# Patient Record
Sex: FEMALE | Race: WHITE | NOT HISPANIC OR LATINO | Employment: FULL TIME | URBAN - METROPOLITAN AREA
[De-identification: names, ages, dates, MRNs, and addresses within clinical notes are randomized per-mention and may not be internally consistent; named-entity substitution may affect disease eponyms.]

---

## 2017-01-09 ENCOUNTER — OFFICE VISIT (OUTPATIENT)
Dept: FAMILY MEDICINE CLINIC | Facility: CLINIC | Age: 57
End: 2017-01-09

## 2017-01-09 VITALS
RESPIRATION RATE: 16 BRPM | DIASTOLIC BLOOD PRESSURE: 68 MMHG | BODY MASS INDEX: 25.06 KG/M2 | OXYGEN SATURATION: 98 % | HEIGHT: 64 IN | WEIGHT: 146.8 LBS | SYSTOLIC BLOOD PRESSURE: 122 MMHG | HEART RATE: 92 BPM | TEMPERATURE: 97.7 F

## 2017-01-09 DIAGNOSIS — I10 ESSENTIAL HYPERTENSION: Primary | ICD-10-CM

## 2017-01-09 DIAGNOSIS — E11.9 CONTROLLED TYPE 2 DIABETES MELLITUS WITHOUT COMPLICATION, WITHOUT LONG-TERM CURRENT USE OF INSULIN (HCC): ICD-10-CM

## 2017-01-09 DIAGNOSIS — E78.5 HYPERLIPIDEMIA, UNSPECIFIED HYPERLIPIDEMIA TYPE: ICD-10-CM

## 2017-01-09 PROCEDURE — 99213 OFFICE O/P EST LOW 20 MIN: CPT | Performed by: INTERNAL MEDICINE

## 2017-01-09 NOTE — PROGRESS NOTES
"Subjective   Patient ID: Rafiq Yost is a 56 y.o. female presents with   Chief Complaint   Patient presents with   • Hypertension     f/u       HPI - this patient presents today for follow-up of hypertension, hyperlipidemia, uncontrolled type 2 diabetes.  She is seeing the endocrinologist and he's titrating her medicines.  I did warn her of the potential of hypoglycemia with her Amaryl.  She watches her sugar closely and she gets symptoms if she gets hypoglycemic.  I do want her to exercise diet and lose weight.  Perhaps then she can come all the way off the glimepiride.    Assessment plan    Hypertension controlled with valsartan hydrochlorothiazide    Diabetes and hyperlipidemia she sees the endocrinologist    Health care maintenance caught up on mammogram.    Allergies   Allergen Reactions   • Codeine Other (See Comments)       The following portions of the patient's history were reviewed and updated as appropriate: allergies, current medications, past family history, past medical history, past social history, past surgical history and problem list.      Review of Systems   Constitutional: Negative.    HENT: Negative.    Respiratory: Negative.    Cardiovascular: Negative.    Psychiatric/Behavioral: Negative.        Objective     Vitals:    01/09/17 0952   BP: 122/68   Pulse: 92   Resp: 16   Temp: 97.7 °F (36.5 °C)   TempSrc: Oral   SpO2: 98%   Weight: 146 lb 12.8 oz (66.6 kg)   Height: 64\" (162.6 cm)         Physical Exam   Constitutional: She appears well-developed and well-nourished.   HENT:   Head: Normocephalic and atraumatic.   Cardiovascular: Normal rate and regular rhythm.    Pulmonary/Chest: Effort normal and breath sounds normal.   Psychiatric: She has a normal mood and affect. Her behavior is normal.   Nursing note and vitals reviewed.        Rafiq was seen today for hypertension.    Diagnoses and all orders for this visit:    Essential hypertension    Controlled type 2 diabetes mellitus without " complication, without long-term current use of insulin    Hyperlipidemia, unspecified hyperlipidemia type        Call or return to clinic prn if these symptoms worsen or fail to improve as anticipated.

## 2017-01-09 NOTE — MR AVS SNAPSHOT
Rafiq Yost   1/9/2017 9:45 AM   Office Visit    Dept Phone:  [phone]   Encounter #:  [id number]    Provider:  Kevin Barton MD   Department:  [facility]IMARY [facility]                Your Full Care Plan              Your Updated Medication List          This list is accurate as of: 1/9/17 10:13 AM.  Always use your most recent med list.                Albiglutide 50 MG pen-injector   Inject 50 mg under the skin every 7 days.       atorvastatin 20 MG tablet   Commonly known as:  LIPITOR   Take 1 tablet by mouth Daily.       Cinnamon 500 MG capsule       CLARITIN 10 MG capsule   Generic drug:  Loratadine       dapagliflozin 5 MG tablet tablet   Commonly known as:  FARXIGA   Take 1 tablet by mouth daily.       glimepiride 2 MG tablet   Commonly known as:  AMARYL   TAKE 2 TABLETS IN THE MORNING AND 1 TABLET AT SUPPER       glucose blood test strip   Commonly known as:  FREESTYLE INSULINX TEST   Testing bs 3 x day       HM VITAMIN D3 2000 UNITS capsule   Generic drug:  Cholecalciferol       metFORMIN  MG 24 hr tablet   Commonly known as:  GLUCOPHAGE-XR       PROBIOTIC ADVANCED PO       valsartan-hydrochlorothiazide 320-12.5 MG per tablet   Commonly known as:  DIOVAN-HCT   Take 1 tablet by mouth daily.               You Were Diagnosed With        Codes Comments    Essential hypertension    -  Primary ICD-10-CM: I10  ICD-9-CM: 401.9     Controlled type 2 diabetes mellitus without complication, without long-term current use of insulin     ICD-10-CM: E11.9  ICD-9-CM: 250.00     Hyperlipidemia, unspecified hyperlipidemia type     ICD-10-CM: E78.5  ICD-9-CM: 272.4       Instructions     None    Patient Instructions History      Upcoming Appointments     Visit Type Date Time Department    OFFICE VISIT 1/9/2017  9:45 AM K MARILYN [place]    OFFICE VISIT 3/13/2017  9:00 AM JACKIE CARPENTER      [email or web address] Signup     [facility] [email or web address] allows you to send messages to your  "doctor, view your test results, renew your prescriptions, schedule appointments, and more. To sign up, go to [employer] and click on the Sign Up Now link in the New User? box. Enter your [employer] Activation Code exactly as it appears below along with the last four digits of your Social Security Number and your Date of Birth () to complete the sign-up process. If you do not sign up before the expiration date, you must request a new code.    [employer] Activation Code: [email or web address]  Expires: 2017 10:13 AM    If you have questions, you can email [email or web address] or call [phone] to talk to our [employer] staff. Remember, [employer] is NOT to be used for urgent needs. For medical emergencies, dial 911.               Other Info from Your Visit           Your Appointments     Mar 13, 2017  9:00 AM EDT   Office Visit with Iglesia Douglas MD   [facility] (--)    [address] [place] [id number] [phone]           Arrive 15 minutes prior to appointment.              Allergies     Codeine Intolerance Other (See Comments)      Reason for Visit     Hypertension f/u      Vital Signs     Blood Pressure Pulse Temperature Respirations Height Weight    122/68 92 97.7 °F (36.5 °C) (Oral) 16 64\" (162.6 cm) 146 lb 12.8 oz (66.6 kg)    Oxygen Saturation Body Mass Index Smoking Status             98% 25.2 kg/m2 Never Smoker         Problems and Diagnoses Noted     Controlled type 2 diabetes mellitus without complication, without long-term current use of insulin    High blood pressure    High cholesterol or triglycerides        "

## 2017-01-23 RX ORDER — METFORMIN HYDROCHLORIDE 500 MG/1
TABLET, EXTENDED RELEASE ORAL
Qty: 360 TABLET | Refills: 1 | Status: SHIPPED | OUTPATIENT
Start: 2017-01-23 | End: 2017-07-20 | Stop reason: SDUPTHER

## 2017-02-09 RX ORDER — VALSARTAN AND HYDROCHLOROTHIAZIDE 320; 12.5 MG/1; MG/1
TABLET, FILM COATED ORAL
Qty: 30 TABLET | Refills: 5 | Status: SHIPPED | OUTPATIENT
Start: 2017-02-09 | End: 2017-03-15

## 2017-03-13 ENCOUNTER — OFFICE VISIT (OUTPATIENT)
Dept: ENDOCRINOLOGY | Age: 57
End: 2017-03-13

## 2017-03-13 VITALS
HEIGHT: 64 IN | OXYGEN SATURATION: 94 % | SYSTOLIC BLOOD PRESSURE: 116 MMHG | WEIGHT: 144.2 LBS | DIASTOLIC BLOOD PRESSURE: 58 MMHG | HEART RATE: 81 BPM | BODY MASS INDEX: 24.62 KG/M2

## 2017-03-13 DIAGNOSIS — I10 ESSENTIAL HYPERTENSION: ICD-10-CM

## 2017-03-13 DIAGNOSIS — E11.9 TYPE 2 DIABETES MELLITUS WITHOUT COMPLICATION, WITH LONG-TERM CURRENT USE OF INSULIN (HCC): Primary | ICD-10-CM

## 2017-03-13 DIAGNOSIS — E78.5 HYPERLIPIDEMIA, UNSPECIFIED HYPERLIPIDEMIA TYPE: ICD-10-CM

## 2017-03-13 DIAGNOSIS — Z79.4 TYPE 2 DIABETES MELLITUS WITHOUT COMPLICATION, WITH LONG-TERM CURRENT USE OF INSULIN (HCC): Primary | ICD-10-CM

## 2017-03-13 DIAGNOSIS — Z78.0 MENOPAUSE: ICD-10-CM

## 2017-03-13 DIAGNOSIS — E55.9 VITAMIN D DEFICIENCY: ICD-10-CM

## 2017-03-13 LAB
25(OH)D3+25(OH)D2 SERPL-MCNC: 40.3 NG/ML (ref 30–100)
ALBUMIN SERPL-MCNC: 4.7 G/DL (ref 3.5–5.2)
ALBUMIN/GLOB SERPL: 1.9 G/DL
ALP SERPL-CCNC: 112 U/L (ref 39–117)
ALT SERPL-CCNC: 19 U/L (ref 1–33)
AST SERPL-CCNC: 16 U/L (ref 1–32)
BILIRUB SERPL-MCNC: 0.3 MG/DL (ref 0.1–1.2)
BUN SERPL-MCNC: 10 MG/DL (ref 6–20)
BUN/CREAT SERPL: 12 (ref 7–25)
CALCIUM SERPL-MCNC: 10.8 MG/DL (ref 8.6–10.5)
CHLORIDE SERPL-SCNC: 93 MMOL/L (ref 98–107)
CHOLEST SERPL-MCNC: 148 MG/DL (ref 0–200)
CO2 SERPL-SCNC: 27.2 MMOL/L (ref 22–29)
CREAT SERPL-MCNC: 0.83 MG/DL (ref 0.57–1)
GLOBULIN SER CALC-MCNC: 2.5 GM/DL
GLUCOSE SERPL-MCNC: 120 MG/DL (ref 65–99)
HBA1C MFR BLD: 7 % (ref 4.8–5.6)
HDLC SERPL-MCNC: 39 MG/DL (ref 40–60)
LDLC SERPL CALC-MCNC: 89 MG/DL (ref 0–100)
POTASSIUM SERPL-SCNC: 4.8 MMOL/L (ref 3.5–5.2)
PROT SERPL-MCNC: 7.2 G/DL (ref 6–8.5)
SODIUM SERPL-SCNC: 137 MMOL/L (ref 136–145)
TRIGL SERPL-MCNC: 99 MG/DL (ref 0–150)
VLDLC SERPL CALC-MCNC: 19.8 MG/DL (ref 5–40)

## 2017-03-13 PROCEDURE — 99214 OFFICE O/P EST MOD 30 MIN: CPT | Performed by: INTERNAL MEDICINE

## 2017-03-13 RX ORDER — GLIMEPIRIDE 2 MG/1
TABLET ORAL
Qty: 60 TABLET | Refills: 5
Start: 2017-03-13 | End: 2017-06-05 | Stop reason: SDUPTHER

## 2017-03-13 NOTE — PROGRESS NOTES
Subjective   Rafiq Yost is a 57 y.o. female.     HPI Comments: F/u for dm 2, hyperlipidemia , hypertension/ testing bs 2  X day / last dm eye exam 2017/ last dm foot exam today with dr Douglas     Diabetes     Hyperlipidemia     Hypertension        Patient is a 55-year-old female who came in for follow-up. She has known diabetes mellitus since . He has been on glimepiride 2 mg 2 tabs q AM and 1 tab q PM and metformin  mg 2 tablets twice a day, Farxiga 5 mg/day and Tanzeum 50 mg weekly. Fasting blood sugar runs between . Random blood sugar runs between 67-90. She denies any severe hypoglycemia.  She has lost 5 pounds since 2016.   Her last meal was last night .      Last eye examination was in  with Dr. Brown. There was no retinopathy. She has early cataracts. She has no microalbuminuria on urine sample taken last . She denies any paresthesias.      She has hyperlipidemia and has been on Lipitor 10 mg once a day. She denies any myalgia.       She is  2 para 1. She had a previous hysterectomy. She is not on hormone replacement therapy. She had a normal bone mineral density last 2014. She has vitamin D deficiency and is on vitamin D 2000 units per day.  Her mother has history of osteoporosis.  She has no parental history of hip fracture.  She denies alcohol or tobacco use.      She has not had a colonoscopy.  She denies bowel complaints.    The following portions of the patient's history were reviewed and updated as appropriate: allergies, current medications, past family history, past medical history, past social history, past surgical history and problem list.    Review of Systems   Constitutional: Negative.    HENT: Negative.    Eyes: Negative.    Respiratory: Negative.    Cardiovascular: Negative.    Gastrointestinal: Positive for abdominal distention (bloating ).   Endocrine: Negative.    Genitourinary: Negative.    Musculoskeletal: Negative.    Skin: Negative.   "  Allergic/Immunologic: Negative.    Neurological: Negative.    Hematological: Negative.    Psychiatric/Behavioral: Negative.        Objective      Vitals:    03/13/17 0851   BP: 116/58   BP Location: Right arm   Patient Position: Sitting   Cuff Size: Small Adult   Pulse: 81   SpO2: 94%   Weight: 144 lb 3.2 oz (65.4 kg)   Height: 64\" (162.6 cm)     Physical Exam   Constitutional: She is oriented to person, place, and time. She appears well-developed and well-nourished. No distress.   HENT:   Head: Normocephalic.   Nose: Nose normal.   Mouth/Throat: No oropharyngeal exudate.   Eyes: Conjunctivae and EOM are normal. Right eye exhibits no discharge. Left eye exhibits no discharge. No scleral icterus.   Neck: Neck supple. No JVD present. No tracheal deviation present. No thyromegaly present.   Cardiovascular: Normal rate, regular rhythm, normal heart sounds and intact distal pulses.  Exam reveals no gallop and no friction rub.    No murmur heard.  Pulmonary/Chest: Effort normal and breath sounds normal. No respiratory distress. She has no wheezes. She has no rales. She exhibits no tenderness.   Abdominal: Soft. Bowel sounds are normal. She exhibits no distension and no mass. There is no tenderness.   Musculoskeletal: Normal range of motion. She exhibits no edema, tenderness or deformity.   No plantar ulcers   Lymphadenopathy:     She has no cervical adenopathy.   Neurological: She is alert and oriented to person, place, and time. She has normal reflexes. She displays normal reflexes.   Intact light touch   Skin: Skin is warm and dry. No rash noted. No pallor.   Psychiatric: She has a normal mood and affect. Her behavior is normal.     Office Visit on 11/21/2016   Component Date Value Ref Range Status   • Glucose 11/21/2016 165* 65 - 99 mg/dL Final   • BUN 11/21/2016 10  6 - 20 mg/dL Final   • Creatinine 11/21/2016 0.84  0.57 - 1.00 mg/dL Final   • eGFR Non  Am 11/21/2016 70  >60 mL/min/1.73 Final   • eGFR African " Am 11/21/2016 85  >60 mL/min/1.73 Final   • BUN/Creatinine Ratio 11/21/2016 11.9  7.0 - 25.0 Final   • Sodium 11/21/2016 139  136 - 145 mmol/L Final   • Potassium 11/21/2016 4.6  3.5 - 5.2 mmol/L Final   • Chloride 11/21/2016 96* 98 - 107 mmol/L Final   • Total CO2 11/21/2016 27.0  22.0 - 29.0 mmol/L Final   • Calcium 11/21/2016 10.9* 8.6 - 10.5 mg/dL Final   • Total Protein 11/21/2016 7.4  6.0 - 8.5 g/dL Final   • Albumin 11/21/2016 4.80  3.50 - 5.20 g/dL Final   • Globulin 11/21/2016 2.6  gm/dL Final   • A/G Ratio 11/21/2016 1.8  g/dL Final   • Total Bilirubin 11/21/2016 0.6  0.1 - 1.2 mg/dL Final   • Alkaline Phosphatase 11/21/2016 121* 39 - 117 U/L Final   • AST (SGOT) 11/21/2016 12  1 - 32 U/L Final   • ALT (SGPT) 11/21/2016 19  1 - 33 U/L Final   • Total Cholesterol 11/21/2016 187  0 - 200 mg/dL Final   • Triglycerides 11/21/2016 203* 0 - 150 mg/dL Final   • HDL Cholesterol 11/21/2016 41  40 - 60 mg/dL Final   • VLDL Cholesterol 11/21/2016 40.6* 5 - 40 mg/dL Final   • LDL Cholesterol  11/21/2016 105* 0 - 100 mg/dL Final   • Hemoglobin A1C 11/21/2016 8.41* 4.80 - 5.60 % Final    Comment: Hemoglobin A1C Ranges:  Increased Risk for Diabetes  5.7% to 6.4%  Diabetes                     >= 6.5%  Diabetic Goal                < 7.0%     • 25 Hydroxy, Vitamin D 11/21/2016 44.6  30.0 - 100.0 ng/mL Final    Comment: Reference Range for Total Vitamin D 25(OH)  Deficiency    <20.0 ng/mL  Insufficiency 21-29 ng/mL  Sufficiency    ng/mL  Toxicity      >100 ng/ml          Assessment/Plan   Rafiq was seen today for diabetes, hyperlipidemia and hypertension.    Diagnoses and all orders for this visit:    Type 2 diabetes mellitus without complication, with long-term current use of insulin  -     Comprehensive Metabolic Panel  -     Hemoglobin A1c    Hyperlipidemia, unspecified hyperlipidemia type  -     Lipid Panel    Essential hypertension    Vitamin D deficiency  -     Vitamin D 25 Hydroxy    Menopause  -     Vitamin D  25 Hydroxy  -     DEXA Bone Density Axial    Other orders  -     glimepiride (AMARYL) 2 MG tablet; TAKE 2 TABLETS IN THE MORNING.      Decrease glimepiride 2 mg  To 2 tablets once a day.  Continue metformin  mg 2 tablets twice a day.  Continue Farxiga 5 mg once a day and Tanzeum 50 mg weekly.  Continue valsartan HCT per Dr. Barton.  Continue vitamin D 2000 units per day.  Schedule bone density.  Advised to get a colonoscopy.    Send copy of my notes and labs to Dr. Barton.

## 2017-03-15 DIAGNOSIS — I15.8 OTHER SECONDARY HYPERTENSION: Primary | ICD-10-CM

## 2017-03-15 DIAGNOSIS — R68.89 ABNORMAL ENDOCRINE LABORATORY TEST FINDING: ICD-10-CM

## 2017-03-15 RX ORDER — VALSARTAN 320 MG/1
320 TABLET ORAL DAILY
Qty: 90 TABLET | Refills: 1 | Status: SHIPPED | OUTPATIENT
Start: 2017-03-15 | End: 2017-08-25 | Stop reason: SDUPTHER

## 2017-03-20 ENCOUNTER — HOSPITAL ENCOUNTER (OUTPATIENT)
Dept: BONE DENSITY | Facility: HOSPITAL | Age: 57
Discharge: HOME | End: 2017-03-20
Attending: INTERNAL MEDICINE | Admitting: INTERNAL MEDICINE

## 2017-03-20 PROCEDURE — 77080 DXA BONE DENSITY AXIAL: CPT

## 2017-04-10 ENCOUNTER — LAB (OUTPATIENT)
Dept: ENDOCRINOLOGY | Age: 57
End: 2017-04-10

## 2017-04-10 DIAGNOSIS — I15.8 OTHER SECONDARY HYPERTENSION: ICD-10-CM

## 2017-04-10 DIAGNOSIS — R68.89 ABNORMAL ENDOCRINE LABORATORY TEST FINDING: ICD-10-CM

## 2017-04-11 LAB
BUN SERPL-MCNC: 12 MG/DL (ref 6–20)
BUN/CREAT SERPL: 15.6 (ref 7–25)
CALCIUM SERPL-MCNC: 9.9 MG/DL (ref 8.6–10.5)
CHLORIDE SERPL-SCNC: 98 MMOL/L (ref 98–107)
CO2 SERPL-SCNC: 24.6 MMOL/L (ref 22–29)
CREAT SERPL-MCNC: 0.77 MG/DL (ref 0.57–1)
GLUCOSE SERPL-MCNC: 115 MG/DL (ref 65–99)
POTASSIUM SERPL-SCNC: 4.6 MMOL/L (ref 3.5–5.2)
PTH-INTACT SERPL-MCNC: 39 PG/ML (ref 15–65)
SODIUM SERPL-SCNC: 138 MMOL/L (ref 136–145)

## 2017-06-02 RX ORDER — ATORVASTATIN CALCIUM 20 MG/1
TABLET, FILM COATED ORAL
Qty: 30 TABLET | Refills: 5 | Status: SHIPPED | OUTPATIENT
Start: 2017-06-02 | End: 2017-06-05 | Stop reason: SDUPTHER

## 2017-06-02 RX ORDER — GLIMEPIRIDE 2 MG/1
TABLET ORAL
Qty: 60 TABLET | Refills: 5 | Status: SHIPPED | OUTPATIENT
Start: 2017-06-02 | End: 2017-06-05 | Stop reason: SDUPTHER

## 2017-06-05 RX ORDER — ATORVASTATIN CALCIUM 20 MG/1
TABLET, FILM COATED ORAL
Qty: 30 TABLET | Refills: 5 | Status: SHIPPED | OUTPATIENT
Start: 2017-06-05 | End: 2018-04-20 | Stop reason: SDUPTHER

## 2017-06-05 RX ORDER — GLIMEPIRIDE 2 MG/1
TABLET ORAL
Qty: 60 TABLET | Refills: 5 | Status: SHIPPED | OUTPATIENT
Start: 2017-06-05 | End: 2018-04-20 | Stop reason: SDUPTHER

## 2017-07-10 ENCOUNTER — OFFICE VISIT (OUTPATIENT)
Dept: FAMILY MEDICINE CLINIC | Facility: CLINIC | Age: 57
End: 2017-07-10

## 2017-07-10 VITALS
RESPIRATION RATE: 16 BRPM | WEIGHT: 145.6 LBS | HEART RATE: 104 BPM | BODY MASS INDEX: 24.86 KG/M2 | SYSTOLIC BLOOD PRESSURE: 116 MMHG | OXYGEN SATURATION: 98 % | HEIGHT: 64 IN | TEMPERATURE: 97.6 F | DIASTOLIC BLOOD PRESSURE: 68 MMHG

## 2017-07-10 DIAGNOSIS — E78.5 HYPERLIPIDEMIA, UNSPECIFIED HYPERLIPIDEMIA TYPE: ICD-10-CM

## 2017-07-10 DIAGNOSIS — I10 ESSENTIAL HYPERTENSION: Primary | ICD-10-CM

## 2017-07-10 DIAGNOSIS — Z12.11 COLON CANCER SCREENING: ICD-10-CM

## 2017-07-10 PROCEDURE — 99213 OFFICE O/P EST LOW 20 MIN: CPT | Performed by: INTERNAL MEDICINE

## 2017-07-10 NOTE — PROGRESS NOTES
"Subjective   Patient ID: Rafiq Yost is a 57 y.o. female presents with   Chief Complaint   Patient presents with   • Hypertension     6 month check       HPI - This patient presents today for follow-up of hypertension hyperlipidemia and type 2 diabetes.  She sees endocrinology and currently her sugar is controlled.  We talked about exercise diet weight loss to achieve some long-term weight loss.    Assessment plan    Essential hypertension hyperlipidemia and diabetes-continue current regimen seeing endocrinologist and blood pressure is well controlled.    Colon cancer screening-she refuses colonoscopy we will send her in a request for the cologuard    Allergies   Allergen Reactions   • Codeine Other (See Comments)       The following portions of the patient's history were reviewed and updated as appropriate: allergies, current medications, past family history, past medical history, past social history, past surgical history and problem list.      Review of Systems   Constitutional: Negative.    HENT: Negative.    Respiratory: Negative.    Cardiovascular: Negative.    Psychiatric/Behavioral: Negative.        Objective     Vitals:    07/10/17 0907   BP: 116/68   Pulse: 104   Resp: 16   Temp: 97.6 °F (36.4 °C)   TempSrc: Oral   SpO2: 98%   Weight: 145 lb 9.6 oz (66 kg)   Height: 64\" (162.6 cm)         Physical Exam   Constitutional: She appears well-developed and well-nourished.   HENT:   Head: Normocephalic and atraumatic.   Cardiovascular: Normal rate, regular rhythm and normal heart sounds.    Pulmonary/Chest: Effort normal and breath sounds normal.   Psychiatric: She has a normal mood and affect. Her behavior is normal.   Nursing note and vitals reviewed.        Rafiq was seen today for hypertension.    Diagnoses and all orders for this visit:    Essential hypertension    Hyperlipidemia, unspecified hyperlipidemia type    Colon cancer screening        Call or return to clinic prn if these symptoms worsen or fail to " improve as anticipated.

## 2017-07-20 DIAGNOSIS — E78.49 OTHER HYPERLIPIDEMIA: ICD-10-CM

## 2017-07-20 DIAGNOSIS — E11.9 TYPE 2 DIABETES MELLITUS WITHOUT COMPLICATION, WITHOUT LONG-TERM CURRENT USE OF INSULIN (HCC): ICD-10-CM

## 2017-07-20 DIAGNOSIS — I10 ESSENTIAL HYPERTENSION: ICD-10-CM

## 2017-07-20 RX ORDER — METFORMIN HYDROCHLORIDE 500 MG/1
TABLET, EXTENDED RELEASE ORAL
Qty: 360 TABLET | Refills: 1 | Status: SHIPPED | OUTPATIENT
Start: 2017-07-20 | End: 2018-01-03 | Stop reason: SDUPTHER

## 2017-07-21 ENCOUNTER — TRANSCRIBE ORDERS (OUTPATIENT)
Dept: ADMINISTRATIVE | Facility: HOSPITAL | Age: 57
End: 2017-07-21

## 2017-07-21 DIAGNOSIS — Z12.31 VISIT FOR SCREENING MAMMOGRAM: Primary | ICD-10-CM

## 2017-07-31 ENCOUNTER — HOSPITAL ENCOUNTER (OUTPATIENT)
Dept: MAMMOGRAPHY | Facility: HOSPITAL | Age: 57
Discharge: HOME | End: 2017-07-31
Admitting: INTERNAL MEDICINE

## 2017-07-31 DIAGNOSIS — Z12.31 VISIT FOR SCREENING MAMMOGRAM: ICD-10-CM

## 2017-07-31 PROCEDURE — G0202 SCR MAMMO BI INCL CAD: HCPCS

## 2017-08-07 ENCOUNTER — OFFICE VISIT (OUTPATIENT)
Dept: ENDOCRINOLOGY | Age: 57
End: 2017-08-07

## 2017-08-07 VITALS
SYSTOLIC BLOOD PRESSURE: 122 MMHG | BODY MASS INDEX: 24.89 KG/M2 | HEART RATE: 84 BPM | HEIGHT: 64 IN | OXYGEN SATURATION: 96 % | WEIGHT: 145.8 LBS | DIASTOLIC BLOOD PRESSURE: 66 MMHG

## 2017-08-07 DIAGNOSIS — Z79.4 TYPE 2 DIABETES MELLITUS WITHOUT COMPLICATION, WITH LONG-TERM CURRENT USE OF INSULIN (HCC): Primary | ICD-10-CM

## 2017-08-07 DIAGNOSIS — E55.9 VITAMIN D DEFICIENCY: ICD-10-CM

## 2017-08-07 DIAGNOSIS — E11.9 TYPE 2 DIABETES MELLITUS WITHOUT COMPLICATION, WITH LONG-TERM CURRENT USE OF INSULIN (HCC): Primary | ICD-10-CM

## 2017-08-07 DIAGNOSIS — E78.5 HYPERLIPIDEMIA, UNSPECIFIED HYPERLIPIDEMIA TYPE: ICD-10-CM

## 2017-08-07 DIAGNOSIS — I10 ESSENTIAL HYPERTENSION: ICD-10-CM

## 2017-08-07 DIAGNOSIS — M85.80 OSTEOPENIA: ICD-10-CM

## 2017-08-07 PROCEDURE — 99214 OFFICE O/P EST MOD 30 MIN: CPT | Performed by: INTERNAL MEDICINE

## 2017-08-07 NOTE — PROGRESS NOTES
Subjective   Rafiq Yost is a 57 y.o. female.     HPI Comments: F//u  For dm2,hyperlipidemia , hypertension / testing bs 2 x day last dm eye exam 2017/ last dm foot exam 3/13/17 with dr Douglas     Diabetes     Hyperlipidemia     Hypertension   Associated symptoms include neck pain.      Patient is a 55-year-old female who came in for follow-up. She has known diabetes mellitus since . He has been on glimepiride 2 mg 1 tabs q AM and 1 tab q PM and metformin  mg 2 tablets twice a day, Farxiga 5 mg/day and Tanzeum 50 mg weekly. Fasting blood sugar runs between . Random blood sugar runs between . She denies any severe hypoglycemia.  She has no significant weight change since 2017..  She has been having vaginal yeast infection about 3 times a month.  She denies dysuria.    Her last meal was last night .      Last eye examination was in  with Dr. Brown. There was no retinopathy. She has early cataracts. She has no microalbuminuria on urine sample taken last . She denies any paresthesias.      She has hyperlipidemia and has been on Lipitor 10 mg once a day. She denies any myalgia.       She is  2 para 1. She had a previous hysterectomy. She is not on hormone replacement therapy. She had a bone density done in on 2017 which showed osteopenia of the lumbar spine and right hip.  Her 10 year probability of major osteoporotic fracture is low at 6.6% and of hip fracture at 0.3%. . She has vitamin D deficiency and is on vitamin D 2000 units per day.  Her mother has history of osteoporosis.  She has no parental history of hip fracture.  She denies alcohol or tobacco use.      She has not had a colonoscopy.  She had a normal Cologuard in 2017.  She denies bowel complaints.    The following portions of the patient's history were reviewed and updated as appropriate: allergies, current medications, past family history, past medical history, past social history, past surgical  "history and problem list.    Review of Systems   Constitutional: Negative.    HENT: Negative.    Eyes: Negative.    Respiratory: Negative.    Cardiovascular: Negative.    Gastrointestinal: Negative.    Endocrine: Negative.    Genitourinary: Negative.    Musculoskeletal: Positive for neck pain.   Skin: Negative.    Allergic/Immunologic: Negative.    Neurological: Negative.    Hematological: Negative.    Psychiatric/Behavioral: Negative.        Objective      Vitals:    08/07/17 0901   BP: 122/66   BP Location: Right arm   Patient Position: Sitting   Cuff Size: Small Adult   Pulse: 84   SpO2: 96%   Weight: 145 lb 12.8 oz (66.1 kg)   Height: 64\" (162.6 cm)     Physical Exam   Constitutional: She is oriented to person, place, and time. She appears well-developed and well-nourished. No distress.   HENT:   Head: Normocephalic.   Nose: Nose normal.   Mouth/Throat: No oropharyngeal exudate.   Eyes: Conjunctivae and EOM are normal. Pupils are equal, round, and reactive to light. Right eye exhibits no discharge. Left eye exhibits no discharge. No scleral icterus.   Neck: Normal range of motion. Neck supple. No JVD present. No tracheal deviation present. No thyromegaly present.   Cardiovascular: Normal rate, regular rhythm, normal heart sounds and intact distal pulses.  Exam reveals no gallop and no friction rub.    No murmur heard.  Pulmonary/Chest: Effort normal and breath sounds normal. No respiratory distress. She has no wheezes. She has no rales. She exhibits no tenderness.   Abdominal: Soft. Bowel sounds are normal. She exhibits no distension and no mass. There is no tenderness.   Musculoskeletal: Normal range of motion. She exhibits no edema, tenderness or deformity.   Lymphadenopathy:     She has no cervical adenopathy.   Neurological: She is alert and oriented to person, place, and time. She has normal reflexes. She displays normal reflexes. Coordination normal.   Intact light touch    Skin: Skin is warm and dry. No " rash noted. No erythema. No pallor.   Psychiatric: She has a normal mood and affect. Her behavior is normal.     Lab on 04/10/2017   Component Date Value Ref Range Status   • Glucose 04/10/2017 115* 65 - 99 mg/dL Final   • BUN 04/10/2017 12  6 - 20 mg/dL Final   • Creatinine 04/10/2017 0.77  0.57 - 1.00 mg/dL Final   • eGFR Non African Am 04/10/2017 77  >60 mL/min/1.73 Final   • eGFR African Am 04/10/2017 93  >60 mL/min/1.73 Final   • BUN/Creatinine Ratio 04/10/2017 15.6  7.0 - 25.0 Final   • Sodium 04/10/2017 138  136 - 145 mmol/L Final   • Potassium 04/10/2017 4.6  3.5 - 5.2 mmol/L Final   • Chloride 04/10/2017 98  98 - 107 mmol/L Final   • Total CO2 04/10/2017 24.6  22.0 - 29.0 mmol/L Final   • Calcium 04/10/2017 9.9  8.6 - 10.5 mg/dL Final   • PTH, Intact 04/10/2017 39  15 - 65 pg/mL Final     Assessment/Plan   Rafiq was seen today for diabetes, hyperlipidemia and hypertension.    Diagnoses and all orders for this visit:    Type 2 diabetes mellitus without complication, with long-term current use of insulin  -     Comprehensive Metabolic Panel  -     Hemoglobin A1c  -     Urinalysis With / Culture If Indicated    Vitamin D deficiency  -     Vitamin D 25 Hydroxy    Hyperlipidemia, unspecified hyperlipidemia type    Essential hypertension    Osteopenia  -     Vitamin D 25 Hydroxy      Discontinue Farxiga because of recurrent vaginal yeast infection.  Continue glimepiride 2 mg one tablet twice a day.  Continue metformin  mg 2 tablets twice a day and Tanzeum 50 mg weekly.  If blood sugar rises off Farxiga, consider increasing glimepiride dose.  Check urinalysis and urine microalbumin.  Check urine microalbumin.  Continue Lipitor 10 mg once a day.  Check vitamin D levels.  Patient has osteopenia and has low risk for osteoporotic fractures.    Biphosphonates are not indicated at present.  Repeat bone density after March 2019.  Continue on vitamin D supplements and weight-bearing exercises.    Send copy of my  notes and labs to Dr. Kevin Barton.    RTC 4 mos.

## 2017-08-10 LAB
25(OH)D3+25(OH)D2 SERPL-MCNC: 41.7 NG/ML (ref 30–100)
ALBUMIN SERPL-MCNC: 4.8 G/DL (ref 3.5–5.2)
ALBUMIN/GLOB SERPL: 1.7 G/DL
ALP SERPL-CCNC: 108 U/L (ref 39–117)
ALT SERPL-CCNC: 22 U/L (ref 1–33)
APPEARANCE UR: CLEAR
AST SERPL-CCNC: 17 U/L (ref 1–32)
BACTERIA #/AREA URNS HPF: NORMAL /HPF
BACTERIA UR CULT: NORMAL
BACTERIA UR CULT: NORMAL
BILIRUB SERPL-MCNC: 0.4 MG/DL (ref 0.1–1.2)
BILIRUB UR QL STRIP: NEGATIVE
BUN SERPL-MCNC: 10 MG/DL (ref 6–20)
BUN/CREAT SERPL: 12.7 (ref 7–25)
CALCIUM SERPL-MCNC: 10.3 MG/DL (ref 8.6–10.5)
CHLORIDE SERPL-SCNC: 98 MMOL/L (ref 98–107)
CO2 SERPL-SCNC: 25.4 MMOL/L (ref 22–29)
COLOR UR: YELLOW
CREAT SERPL-MCNC: 0.79 MG/DL (ref 0.57–1)
EPI CELLS #/AREA URNS HPF: NORMAL /HPF
GLOBULIN SER CALC-MCNC: 2.8 GM/DL
GLUCOSE SERPL-MCNC: 136 MG/DL (ref 65–99)
GLUCOSE UR QL: ABNORMAL
HBA1C MFR BLD: 7.5 % (ref 4.8–5.6)
HGB UR QL STRIP: NEGATIVE
KETONES UR QL STRIP: NEGATIVE
LEUKOCYTE ESTERASE UR QL STRIP: ABNORMAL
MICRO URNS: ABNORMAL
MICROALBUMIN UR-MCNC: <3 UG/ML
MUCOUS THREADS URNS QL MICRO: PRESENT /HPF
NITRITE UR QL STRIP: NEGATIVE
PH UR STRIP: 6 [PH] (ref 5–7.5)
POTASSIUM SERPL-SCNC: 4.9 MMOL/L (ref 3.5–5.2)
PROT SERPL-MCNC: 7.6 G/DL (ref 6–8.5)
PROT UR QL STRIP: NEGATIVE
RBC #/AREA URNS HPF: NORMAL /HPF
SODIUM SERPL-SCNC: 139 MMOL/L (ref 136–145)
SP GR UR: 1.02 (ref 1–1.03)
URINALYSIS REFLEX: ABNORMAL
UROBILINOGEN UR STRIP-MCNC: 0.2 MG/DL (ref 0.2–1)
WBC #/AREA URNS HPF: NORMAL /HPF

## 2017-08-28 RX ORDER — VALSARTAN 320 MG/1
TABLET ORAL
Qty: 90 TABLET | Refills: 1 | Status: SHIPPED | OUTPATIENT
Start: 2017-08-28 | End: 2018-02-15 | Stop reason: SDUPTHER

## 2018-01-03 RX ORDER — METFORMIN HYDROCHLORIDE 500 MG/1
TABLET, EXTENDED RELEASE ORAL
Qty: 360 TABLET | Refills: 1 | Status: SHIPPED | OUTPATIENT
Start: 2018-01-03 | End: 2018-06-21 | Stop reason: SDUPTHER

## 2018-01-08 ENCOUNTER — OFFICE VISIT (OUTPATIENT)
Dept: FAMILY MEDICINE CLINIC | Facility: CLINIC | Age: 58
End: 2018-01-08

## 2018-01-08 VITALS
OXYGEN SATURATION: 97 % | HEART RATE: 100 BPM | RESPIRATION RATE: 16 BRPM | TEMPERATURE: 98.3 F | BODY MASS INDEX: 25.1 KG/M2 | SYSTOLIC BLOOD PRESSURE: 110 MMHG | DIASTOLIC BLOOD PRESSURE: 70 MMHG | HEIGHT: 64 IN | WEIGHT: 147 LBS

## 2018-01-08 DIAGNOSIS — Z79.4 TYPE 2 DIABETES MELLITUS WITHOUT COMPLICATION, WITH LONG-TERM CURRENT USE OF INSULIN (HCC): ICD-10-CM

## 2018-01-08 DIAGNOSIS — J01.00 SUBACUTE MAXILLARY SINUSITIS: ICD-10-CM

## 2018-01-08 DIAGNOSIS — I10 ESSENTIAL HYPERTENSION: ICD-10-CM

## 2018-01-08 DIAGNOSIS — E11.9 TYPE 2 DIABETES MELLITUS WITHOUT COMPLICATION, WITH LONG-TERM CURRENT USE OF INSULIN (HCC): ICD-10-CM

## 2018-01-08 DIAGNOSIS — E78.5 HYPERLIPIDEMIA, UNSPECIFIED HYPERLIPIDEMIA TYPE: Primary | ICD-10-CM

## 2018-01-08 DIAGNOSIS — E55.9 VITAMIN D DEFICIENCY: ICD-10-CM

## 2018-01-08 LAB
25(OH)D3+25(OH)D2 SERPL-MCNC: 43.7 NG/ML (ref 30–100)
ALBUMIN SERPL-MCNC: 4.8 G/DL (ref 3.5–5.2)
ALBUMIN/GLOB SERPL: 1.8 G/DL
ALP SERPL-CCNC: 124 U/L (ref 39–117)
ALT SERPL-CCNC: 13 U/L (ref 1–33)
AST SERPL-CCNC: 11 U/L (ref 1–32)
BILIRUB SERPL-MCNC: 0.3 MG/DL (ref 0.1–1.2)
BUN SERPL-MCNC: 11 MG/DL (ref 6–20)
BUN/CREAT SERPL: 13.8 (ref 7–25)
CALCIUM SERPL-MCNC: 10.1 MG/DL (ref 8.6–10.5)
CHLORIDE SERPL-SCNC: 97 MMOL/L (ref 98–107)
CHOLEST SERPL-MCNC: 171 MG/DL (ref 0–200)
CO2 SERPL-SCNC: 27.1 MMOL/L (ref 22–29)
CREAT SERPL-MCNC: 0.8 MG/DL (ref 0.57–1)
GLOBULIN SER CALC-MCNC: 2.6 GM/DL
GLUCOSE SERPL-MCNC: 130 MG/DL (ref 65–99)
HBA1C MFR BLD: 7.3 % (ref 4.8–5.6)
HDLC SERPL-MCNC: 40 MG/DL (ref 40–60)
LDLC SERPL CALC-MCNC: 98 MG/DL (ref 0–100)
POTASSIUM SERPL-SCNC: 4.6 MMOL/L (ref 3.5–5.2)
PROT SERPL-MCNC: 7.4 G/DL (ref 6–8.5)
SODIUM SERPL-SCNC: 138 MMOL/L (ref 136–145)
TRIGL SERPL-MCNC: 164 MG/DL (ref 0–150)
VLDLC SERPL CALC-MCNC: 32.8 MG/DL (ref 5–40)

## 2018-01-08 PROCEDURE — 99214 OFFICE O/P EST MOD 30 MIN: CPT | Performed by: INTERNAL MEDICINE

## 2018-01-08 RX ORDER — DAPAGLIFLOZIN 5 MG/1
TABLET, FILM COATED ORAL
Refills: 0 | COMMUNITY
Start: 2017-12-11 | End: 2018-01-09

## 2018-01-08 RX ORDER — AMOXICILLIN 500 MG/1
500 CAPSULE ORAL 3 TIMES DAILY
Qty: 21 CAPSULE | Refills: 0 | Status: SHIPPED | OUTPATIENT
Start: 2018-01-08 | End: 2018-07-30

## 2018-01-08 NOTE — PROGRESS NOTES
"Subjective   Patient ID: Rafiq Yost is a 57 y.o. female presents with   Chief Complaint   Patient presents with   • Diabetes     pt is fasting for labs   • Hypertension       HPI - This patient this patient presents today for treatment hyperlipidemia, hypertension, vitamin D deficiency, type 2 diabetes and she also has had a sinus infection that's been going on for a few weeks.  She also has some dental issues she started taking her.  Overall she feels good.  She is not exercising like I want her to counseled her.    Assessment plan    Type 2 diabetes continue current regimen will get A1c recommend exercise and I high protein low-carb diet    Vitamin D deficiency check vitamin D level    Hypertension valsartan 320 get a CMP    Hyperlipidemia Lipitor 20 we'll get a fasting lipid profile.    Subacute maxillary sinusitis amoxicillin.    Allergies   Allergen Reactions   • Codeine Other (See Comments)       The following portions of the patient's history were reviewed and updated as appropriate: allergies, current medications, past family history, past medical history, past social history, past surgical history and problem list.      Review of Systems   Constitutional: Negative.    HENT: Positive for congestion, postnasal drip and sinus pressure.    Eyes: Negative.    Respiratory: Negative.    Cardiovascular: Negative.    Gastrointestinal: Negative.    Endocrine: Negative.    Genitourinary: Negative.    Musculoskeletal: Negative.    Skin: Negative.    Allergic/Immunologic: Negative.    Neurological: Negative.    Hematological: Negative.    Psychiatric/Behavioral: Negative.        Objective     Vitals:    01/08/18 0914   BP: 110/70   Pulse: 100   Resp: 16   Temp: 98.3 °F (36.8 °C)   TempSrc: Oral   SpO2: 97%   Weight: 66.7 kg (147 lb)   Height: 162.6 cm (64\")         Physical Exam   Constitutional: She is oriented to person, place, and time. She appears well-developed and well-nourished. No distress.   HENT:   Head: " Normocephalic and atraumatic.   Eyes: Conjunctivae and EOM are normal. Pupils are equal, round, and reactive to light. Right eye exhibits no discharge. Left eye exhibits no discharge. No scleral icterus.   Neck: Normal range of motion. Neck supple. No tracheal deviation present. No thyromegaly present.   Cardiovascular: Normal rate, regular rhythm, normal heart sounds and normal pulses.  Exam reveals no gallop.    No murmur heard.  Pulmonary/Chest: Effort normal and breath sounds normal. No respiratory distress. She has no wheezes. She has no rales.   Musculoskeletal: Normal range of motion.   Neurological: She is alert and oriented to person, place, and time. She exhibits normal muscle tone. Coordination normal.   Skin: Skin is warm and dry. No rash noted. No erythema. No pallor.   Psychiatric: She has a normal mood and affect. Her behavior is normal. Judgment and thought content normal.   Nursing note and vitals reviewed.        Rafiq was seen today for diabetes and hypertension.    Diagnoses and all orders for this visit:    Hyperlipidemia, unspecified hyperlipidemia type  -     Lipid Panel  -     Hemoglobin A1c  -     Vitamin D 25 Hydroxy  -     Comprehensive Metabolic Panel    Essential hypertension  -     Lipid Panel  -     Hemoglobin A1c  -     Vitamin D 25 Hydroxy  -     Comprehensive Metabolic Panel    Vitamin D deficiency  -     Lipid Panel  -     Hemoglobin A1c  -     Vitamin D 25 Hydroxy  -     Comprehensive Metabolic Panel    Type 2 diabetes mellitus without complication, with long-term current use of insulin  -     Lipid Panel  -     Hemoglobin A1c  -     Vitamin D 25 Hydroxy  -     Comprehensive Metabolic Panel    Subacute maxillary sinusitis    Other orders  -     amoxicillin (AMOXIL) 500 MG capsule; Take 1 capsule by mouth 3 (Three) Times a Day.        Call or return to clinic prn if these symptoms worsen or fail to improve as anticipated.

## 2018-01-22 ENCOUNTER — OFFICE VISIT (OUTPATIENT)
Dept: ENDOCRINOLOGY | Age: 58
End: 2018-01-22

## 2018-01-22 VITALS
WEIGHT: 148.2 LBS | BODY MASS INDEX: 25.3 KG/M2 | OXYGEN SATURATION: 94 % | SYSTOLIC BLOOD PRESSURE: 128 MMHG | DIASTOLIC BLOOD PRESSURE: 62 MMHG | HEART RATE: 89 BPM | HEIGHT: 64 IN

## 2018-01-22 DIAGNOSIS — E55.9 VITAMIN D DEFICIENCY: ICD-10-CM

## 2018-01-22 DIAGNOSIS — E78.5 HYPERLIPIDEMIA, UNSPECIFIED HYPERLIPIDEMIA TYPE: ICD-10-CM

## 2018-01-22 DIAGNOSIS — I10 ESSENTIAL HYPERTENSION: ICD-10-CM

## 2018-01-22 DIAGNOSIS — E11.9 TYPE 2 DIABETES MELLITUS WITHOUT COMPLICATION, WITH LONG-TERM CURRENT USE OF INSULIN (HCC): Primary | ICD-10-CM

## 2018-01-22 DIAGNOSIS — Z79.4 TYPE 2 DIABETES MELLITUS WITHOUT COMPLICATION, WITH LONG-TERM CURRENT USE OF INSULIN (HCC): Primary | ICD-10-CM

## 2018-01-22 PROBLEM — M85.89 OSTEOPENIA OF MULTIPLE SITES: Status: ACTIVE | Noted: 2018-01-22

## 2018-01-22 PROCEDURE — 99214 OFFICE O/P EST MOD 30 MIN: CPT | Performed by: INTERNAL MEDICINE

## 2018-01-22 NOTE — PROGRESS NOTES
Subjective   Rafiq Yost is a 57 y.o. female.     HPI Comments: F/y for dm 2,hypertension, hyperlipidemia / testing bs 2 x day / last dm eye exam 2017 with dr Brown / last dm foot exam today with dr Douglas     Diabetes     Hyperlipidemia     Hypertension        Patient is a 57-year-old female who came in for follow-up. She has known diabetes mellitus since . He has been on glimepiride 2 mg 1 tabs q AM and 1 tab q PM and metformin  mg 2 tablets twice a day, Farxiga 10 mg/day and Tanzeum 50 mg weekly. Fasting blood sugar runs between . Random blood sugar runs between . She denies any severe hypoglycemia.  She has gained 3 lbs since .  She denies dysuria.  Hemoglobin A1c done in  was 7.3%. Her last meal was last night .      Last eye examination was in  with Dr. Brown. There was no retinopathy. She has early cataracts. She has no microalbuminuria on urine sample taken last .  She is on Diovan.  She denies any paresthesias.      She has hyperlipidemia and has been on Lipitor 10 mg once a day. She denies any myalgia.  Lipid profile done in 2018 are as follows:  Cholesterol 171.  HDL 40.  LDL 98.  Triglycerides 164.      She is  2 para 1. She had a previous hysterectomy. She is not on hormone replacement therapy. She had a bone density done in on 2017 which showed osteopenia of the lumbar spine and right hip.  Her 10 year probability of major osteoporotic fracture is low at 6.6% and of hip fracture at 0.3%. . She has vitamin D deficiency and is on vitamin D 2000 units per day.  Her mother has history of osteoporosis.  She has no parental history of hip fracture.  She denies alcohol or tobacco use.  25-hydroxy vitamin D levels done in 2018 was normal at 43.7 ng per ml.     She has hypertension and is on Diovan 320 mg once a day.  She has no previous history of heart attack or stroke.  She denies chest pain or shortness of breath.     She has not had a  colonoscopy.  She had a normal Cologuard in July 2017.  She denies bowel complaints.      The following portions of the patient's history were reviewed and updated as appropriate: allergies, current medications, past family history, past medical history, past social history, past surgical history and problem list.    Review of Systems   Constitutional: Negative.    HENT: Negative.    Eyes: Negative.    Respiratory: Negative.    Cardiovascular: Negative.    Gastrointestinal: Negative.    Endocrine: Negative.    Genitourinary: Negative.    Musculoskeletal: Negative.    Skin: Negative.    Allergic/Immunologic: Negative.    Neurological: Negative.    Hematological: Negative.    Psychiatric/Behavioral: Negative.        Objective   Physical Exam   Constitutional: She is oriented to person, place, and time. She appears well-developed and well-nourished. No distress.   HENT:   Head: Normocephalic.   Nose: Nose normal.   Mouth/Throat: No oropharyngeal exudate.   Eyes: Conjunctivae and EOM are normal. Right eye exhibits no discharge. Left eye exhibits no discharge. No scleral icterus.   Neck: Neck supple. No JVD present. No tracheal deviation present. No thyromegaly present.   Cardiovascular: Normal rate, regular rhythm, normal heart sounds and intact distal pulses.  Exam reveals no gallop and no friction rub.    No murmur heard.  Pulmonary/Chest: Effort normal and breath sounds normal. No respiratory distress. She has no wheezes. She has no rales.   Abdominal: Soft. Bowel sounds are normal. She exhibits no distension and no mass. There is no tenderness.   Musculoskeletal: Normal range of motion. She exhibits no edema, tenderness or deformity.   Lymphadenopathy:     She has no cervical adenopathy.   Neurological: She is alert and oriented to person, place, and time. She has normal reflexes. She displays normal reflexes. Coordination normal.   Intact light touch   Skin: Skin is warm and dry. No rash noted. No erythema. No  pallor.   Psychiatric: She has a normal mood and affect. Her behavior is normal.     Office Visit on 01/08/2018   Component Date Value Ref Range Status   • Total Cholesterol 01/08/2018 171  0 - 200 mg/dL Final   • Triglycerides 01/08/2018 164* 0 - 150 mg/dL Final   • HDL Cholesterol 01/08/2018 40  40 - 60 mg/dL Final   • VLDL Cholesterol 01/08/2018 32.8  5 - 40 mg/dL Final   • LDL Cholesterol  01/08/2018 98  0 - 100 mg/dL Final   • Hemoglobin A1C 01/08/2018 7.30* 4.80 - 5.60 % Final    Comment: Hemoglobin A1C Ranges:  Increased Risk for Diabetes  5.7% to 6.4%  Diabetes                     >= 6.5%  Diabetic Goal                < 7.0%     • 25 Hydroxy, Vitamin D 01/08/2018 43.7  30.0 - 100.0 ng/mL Final    Comment: Reference Range for Total Vitamin D 25(OH)  Deficiency    <20.0 ng/mL  Insufficiency 21-29 ng/mL  Sufficiency    ng/mL  Toxicity      >100 ng/ml        • Glucose 01/08/2018 130* 65 - 99 mg/dL Final   • BUN 01/08/2018 11  6 - 20 mg/dL Final   • Creatinine 01/08/2018 0.80  0.57 - 1.00 mg/dL Final   • eGFR Non  Am 01/08/2018 74  >60 mL/min/1.73 Final   • eGFR African Am 01/08/2018 89  >60 mL/min/1.73 Final   • BUN/Creatinine Ratio 01/08/2018 13.8  7.0 - 25.0 Final   • Sodium 01/08/2018 138  136 - 145 mmol/L Final   • Potassium 01/08/2018 4.6  3.5 - 5.2 mmol/L Final   • Chloride 01/08/2018 97* 98 - 107 mmol/L Final   • Total CO2 01/08/2018 27.1  22.0 - 29.0 mmol/L Final   • Calcium 01/08/2018 10.1  8.6 - 10.5 mg/dL Final   • Total Protein 01/08/2018 7.4  6.0 - 8.5 g/dL Final   • Albumin 01/08/2018 4.80  3.50 - 5.20 g/dL Final   • Globulin 01/08/2018 2.6  gm/dL Final   • A/G Ratio 01/08/2018 1.8  g/dL Final   • Total Bilirubin 01/08/2018 0.3  0.1 - 1.2 mg/dL Final   • Alkaline Phosphatase 01/08/2018 124* 39 - 117 U/L Final   • AST (SGOT) 01/08/2018 11  1 - 32 U/L Final   • ALT (SGPT) 01/08/2018 13  1 - 33 U/L Final     Assessment/Plan   Rafiq was seen today for diabetes, hyperlipidemia and  hypertension.    Diagnoses and all orders for this visit:    Type 2 diabetes mellitus without complication, with long-term current use of insulin    Essential hypertension    Hyperlipidemia, unspecified hyperlipidemia type    Vitamin D deficiency      Continue glimepiride, metformin, Farxiga and Tanzeum.  Patient will check if Bydureon, Trulicity or Victoza is covered cheaper through her insurance.  Continue Lipitor 10 mg once a day.  Continue vitamin D 2000 units per day.  Continue Diovan per Dr. Barton    Send copy of my notes and labs to Dr. Barton    RTC 6 mos.

## 2018-02-15 RX ORDER — VALSARTAN 320 MG/1
TABLET ORAL
Qty: 90 TABLET | Refills: 1 | Status: SHIPPED | OUTPATIENT
Start: 2018-02-15 | End: 2018-07-30 | Stop reason: ALTCHOICE

## 2018-04-20 RX ORDER — ATORVASTATIN CALCIUM 20 MG/1
TABLET, FILM COATED ORAL
Qty: 30 TABLET | Refills: 5 | Status: SHIPPED | OUTPATIENT
Start: 2018-04-20 | End: 2018-07-30 | Stop reason: SDUPTHER

## 2018-04-20 RX ORDER — GLIMEPIRIDE 2 MG/1
TABLET ORAL
Qty: 60 TABLET | Refills: 5 | Status: SHIPPED | OUTPATIENT
Start: 2018-04-20 | End: 2018-07-30 | Stop reason: SDUPTHER

## 2018-06-14 ENCOUNTER — TRANSCRIBE ORDERS (OUTPATIENT)
Dept: ADMINISTRATIVE | Facility: HOSPITAL | Age: 58
End: 2018-06-14

## 2018-06-14 DIAGNOSIS — Z12.31 VISIT FOR SCREENING MAMMOGRAM: Primary | ICD-10-CM

## 2018-06-21 RX ORDER — METFORMIN HYDROCHLORIDE 500 MG/1
500 TABLET, EXTENDED RELEASE ORAL 4 TIMES DAILY
Qty: 360 TABLET | Refills: 0 | Status: SHIPPED | OUTPATIENT
Start: 2018-06-21 | End: 2018-07-30 | Stop reason: SDUPTHER

## 2018-07-30 ENCOUNTER — OFFICE VISIT (OUTPATIENT)
Dept: ENDOCRINOLOGY | Age: 58
End: 2018-07-30

## 2018-07-30 VITALS
OXYGEN SATURATION: 98 % | DIASTOLIC BLOOD PRESSURE: 70 MMHG | BODY MASS INDEX: 24.69 KG/M2 | WEIGHT: 144.6 LBS | HEART RATE: 88 BPM | SYSTOLIC BLOOD PRESSURE: 124 MMHG | HEIGHT: 64 IN

## 2018-07-30 DIAGNOSIS — Z79.4 TYPE 2 DIABETES MELLITUS WITHOUT COMPLICATION, WITH LONG-TERM CURRENT USE OF INSULIN (HCC): Primary | ICD-10-CM

## 2018-07-30 DIAGNOSIS — E55.9 VITAMIN D DEFICIENCY: ICD-10-CM

## 2018-07-30 DIAGNOSIS — E78.5 HYPERLIPIDEMIA, UNSPECIFIED HYPERLIPIDEMIA TYPE: ICD-10-CM

## 2018-07-30 DIAGNOSIS — E11.9 TYPE 2 DIABETES MELLITUS WITHOUT COMPLICATION, WITH LONG-TERM CURRENT USE OF INSULIN (HCC): Primary | ICD-10-CM

## 2018-07-30 DIAGNOSIS — I10 ESSENTIAL HYPERTENSION: ICD-10-CM

## 2018-07-30 DIAGNOSIS — M85.89 OSTEOPENIA OF MULTIPLE SITES: ICD-10-CM

## 2018-07-30 PROBLEM — J01.00 SUBACUTE MAXILLARY SINUSITIS: Status: RESOLVED | Noted: 2018-01-08 | Resolved: 2018-07-30

## 2018-07-30 LAB
25(OH)D3+25(OH)D2 SERPL-MCNC: 40.5 NG/ML (ref 30–100)
ALBUMIN SERPL-MCNC: 4.4 G/DL (ref 3.5–5.2)
ALBUMIN/GLOB SERPL: 2.1 G/DL
ALP SERPL-CCNC: 104 U/L (ref 39–117)
ALT SERPL-CCNC: 20 U/L (ref 1–33)
AST SERPL-CCNC: 17 U/L (ref 1–32)
BILIRUB SERPL-MCNC: 0.4 MG/DL (ref 0.1–1.2)
BUN SERPL-MCNC: 9 MG/DL (ref 6–20)
BUN/CREAT SERPL: 12.5 (ref 7–25)
CALCIUM SERPL-MCNC: 10 MG/DL (ref 8.6–10.5)
CHLORIDE SERPL-SCNC: 97 MMOL/L (ref 98–107)
CHOLEST SERPL-MCNC: 156 MG/DL (ref 0–200)
CO2 SERPL-SCNC: 24.8 MMOL/L (ref 22–29)
CREAT SERPL-MCNC: 0.72 MG/DL (ref 0.57–1)
GLOBULIN SER CALC-MCNC: 2.1 GM/DL
GLUCOSE SERPL-MCNC: 109 MG/DL (ref 65–99)
HBA1C MFR BLD: 6.81 % (ref 4.8–5.6)
HDLC SERPL-MCNC: 39 MG/DL (ref 40–60)
INTERPRETATION: NORMAL
LDLC SERPL CALC-MCNC: 89 MG/DL (ref 0–100)
Lab: NORMAL
POTASSIUM SERPL-SCNC: 5 MMOL/L (ref 3.5–5.2)
PROT SERPL-MCNC: 6.5 G/DL (ref 6–8.5)
SODIUM SERPL-SCNC: 138 MMOL/L (ref 136–145)
T4 FREE SERPL-MCNC: 1.26 NG/DL (ref 0.93–1.7)
TRIGL SERPL-MCNC: 140 MG/DL (ref 0–150)
TSH SERPL DL<=0.005 MIU/L-ACNC: 1.54 MIU/ML (ref 0.27–4.2)
VLDLC SERPL CALC-MCNC: 28 MG/DL (ref 5–40)

## 2018-07-30 PROCEDURE — 99214 OFFICE O/P EST MOD 30 MIN: CPT | Performed by: INTERNAL MEDICINE

## 2018-07-30 RX ORDER — METFORMIN HYDROCHLORIDE 500 MG/1
1000 TABLET, EXTENDED RELEASE ORAL 2 TIMES DAILY WITH MEALS
Qty: 360 TABLET | Refills: 3 | Status: SHIPPED | OUTPATIENT
Start: 2018-07-31 | End: 2019-10-08 | Stop reason: SDUPTHER

## 2018-07-30 RX ORDER — GLIMEPIRIDE 2 MG/1
2 TABLET ORAL 2 TIMES DAILY
Qty: 180 TABLET | Refills: 3 | Status: SHIPPED | OUTPATIENT
Start: 2018-07-30 | End: 2019-06-24

## 2018-07-30 RX ORDER — ATORVASTATIN CALCIUM 20 MG/1
20 TABLET, FILM COATED ORAL DAILY
Qty: 90 TABLET | Refills: 3 | Status: SHIPPED | OUTPATIENT
Start: 2018-07-30 | End: 2019-07-01

## 2018-07-30 RX ORDER — IRBESARTAN 150 MG/1
150 TABLET ORAL DAILY
Qty: 90 TABLET | Refills: 3 | Status: SHIPPED | OUTPATIENT
Start: 2018-07-30 | End: 2019-01-28

## 2018-07-30 RX ORDER — QUINIDINE SULFATE 200 MG
1 TABLET ORAL 2 TIMES DAILY
COMMUNITY
End: 2019-12-18 | Stop reason: HOSPADM

## 2018-07-30 NOTE — PROGRESS NOTES
Ewelina Yost is a 58 y.o. female.     F/u for dm 2, hypertension, hyperlipidemia / testing bs 2 x day / last dm eye exam 18 with dr Brown / last dm foot exam 18 with dr Douglas       Diabetes     Hypertension        Patient is a 57-year-old female who came in for follow-up. She has known diabetes mellitus since . He has been on glimepiride 2 mg 1 tabs q AM and 1 tab q PM and metformin  mg 2 tablets twice a day, Farxiga 10 mg/day and Trulicity 1.5 mg weekly. Fasting blood sugar runs between . Random blood sugar runs between . She denies any severe hypoglycemia.  She has lost 3 lbs since .  Her last meal was last night.      Last eye examination was in  with Dr. Brown. There was no retinopathy. She has early cataracts. She has no microalbuminuria on urine sample taken last .  She is on generic Diovan which has been recalled.  She denies any paresthesias.      She has hyperlipidemia and has been on Lipitor 10 mg once a day. She denies any myalgia.        She is  2 para 1. She had a previous hysterectomy. She is not on hormone replacement therapy. She had a bone density done in on 2017 which showed osteopenia of the lumbar spine and right hip.  Her 10 year probability of major osteoporotic fracture is low at 6.6% and of hip fracture at 0.3%. . She has vitamin D deficiency and is on vitamin D 2000 units per day.  Her mother has history of osteoporosis.  She has no parental history of hip fracture.  She denies alcohol or tobacco use.       She has hypertension and is on Diovan 320 mg once a day.  She has no previous history of heart attack or stroke.  She denies chest pain or shortness of breath.     She has not had a colonoscopy.  She had a normal Cologuard in 2017.  She denies bowel complaints.     The following portions of the patient's history were reviewed and updated as appropriate: allergies, current medications, past family history, past medical  "history, past social history, past surgical history and problem list.    Review of Systems   Constitutional: Negative.    HENT: Negative.    Eyes: Negative.    Respiratory: Negative.    Cardiovascular: Negative.    Gastrointestinal: Negative.    Endocrine: Negative.    Genitourinary: Negative.    Musculoskeletal: Negative.    Skin: Negative.    Allergic/Immunologic: Negative.    Neurological: Negative.    Hematological: Negative.    Psychiatric/Behavioral: Negative.        Objective      Vitals:    07/30/18 0902   BP: 124/70   BP Location: Right arm   Patient Position: Sitting   Cuff Size: Large Adult   Pulse: 88   SpO2: 98%   Weight: 65.6 kg (144 lb 9.6 oz)   Height: 162.6 cm (64.02\")     Physical Exam   Constitutional: She is oriented to person, place, and time. She appears well-developed and well-nourished.   HENT:   Head: Normocephalic.   Nose: Nose normal.   Mouth/Throat: No oropharyngeal exudate.   Eyes: Conjunctivae and EOM are normal. Right eye exhibits no discharge. Left eye exhibits no discharge. No scleral icterus.   Neck: Neck supple. No JVD present. No tracheal deviation present. No thyromegaly present.   Cardiovascular: Normal rate, regular rhythm, normal heart sounds and intact distal pulses.  Exam reveals no gallop and no friction rub.    No murmur heard.  Pulmonary/Chest: Effort normal and breath sounds normal. No respiratory distress. She has no wheezes. She has no rales. She exhibits no tenderness.   Abdominal: Soft. Bowel sounds are normal. She exhibits no distension and no mass. There is no tenderness. There is no rebound and no guarding.   Musculoskeletal: Normal range of motion. She exhibits no edema, tenderness or deformity.   Lymphadenopathy:     She has no cervical adenopathy.   Neurological: She is alert and oriented to person, place, and time. She displays normal reflexes. She exhibits normal muscle tone. Coordination normal.   Intact light touch   Skin: Skin is warm and dry. No pallor. "   Psychiatric: She has a normal mood and affect. Her behavior is normal.     Office Visit on 01/08/2018   Component Date Value Ref Range Status   • Total Cholesterol 01/08/2018 171  0 - 200 mg/dL Final   • Triglycerides 01/08/2018 164* 0 - 150 mg/dL Final   • HDL Cholesterol 01/08/2018 40  40 - 60 mg/dL Final   • VLDL Cholesterol 01/08/2018 32.8  5 - 40 mg/dL Final   • LDL Cholesterol  01/08/2018 98  0 - 100 mg/dL Final   • Hemoglobin A1C 01/08/2018 7.30* 4.80 - 5.60 % Final    Comment: Hemoglobin A1C Ranges:  Increased Risk for Diabetes  5.7% to 6.4%  Diabetes                     >= 6.5%  Diabetic Goal                < 7.0%     • 25 Hydroxy, Vitamin D 01/08/2018 43.7  30.0 - 100.0 ng/mL Final    Comment: Reference Range for Total Vitamin D 25(OH)  Deficiency    <20.0 ng/mL  Insufficiency 21-29 ng/mL  Sufficiency    ng/mL  Toxicity      >100 ng/ml        • Glucose 01/08/2018 130* 65 - 99 mg/dL Final   • BUN 01/08/2018 11  6 - 20 mg/dL Final   • Creatinine 01/08/2018 0.80  0.57 - 1.00 mg/dL Final   • eGFR Non  Am 01/08/2018 74  >60 mL/min/1.73 Final   • eGFR African Am 01/08/2018 89  >60 mL/min/1.73 Final   • BUN/Creatinine Ratio 01/08/2018 13.8  7.0 - 25.0 Final   • Sodium 01/08/2018 138  136 - 145 mmol/L Final   • Potassium 01/08/2018 4.6  3.5 - 5.2 mmol/L Final   • Chloride 01/08/2018 97* 98 - 107 mmol/L Final   • Total CO2 01/08/2018 27.1  22.0 - 29.0 mmol/L Final   • Calcium 01/08/2018 10.1  8.6 - 10.5 mg/dL Final   • Total Protein 01/08/2018 7.4  6.0 - 8.5 g/dL Final   • Albumin 01/08/2018 4.80  3.50 - 5.20 g/dL Final   • Globulin 01/08/2018 2.6  gm/dL Final   • A/G Ratio 01/08/2018 1.8  g/dL Final   • Total Bilirubin 01/08/2018 0.3  0.1 - 1.2 mg/dL Final   • Alkaline Phosphatase 01/08/2018 124* 39 - 117 U/L Final   • AST (SGOT) 01/08/2018 11  1 - 32 U/L Final   • ALT (SGPT) 01/08/2018 13  1 - 33 U/L Final     Assessment/Plan   Rafiq was seen today for diabetes and hypertension.    Diagnoses and  all orders for this visit:    Type 2 diabetes mellitus without complication, with long-term current use of insulin (CMS/[facility])  -     glimepiride (AMARYL) 2 MG tablet; Take 1 tablet by mouth 2 (Two) Times a Day.  -     metFORMIN ER (GLUCOPHAGE-XR) 500 MG 24 hr tablet; 2 tablets twice a day with meals  -     Comprehensive Metabolic Panel  -     Lipid Panel  -     Hemoglobin A1c  -     TSH  -     T4, Free    Hyperlipidemia, unspecified hyperlipidemia type  -     atorvastatin (LIPITOR) 20 MG tablet; Take 1 tablet by mouth Daily.  -     Comprehensive Metabolic Panel  -     Lipid Panel  -     Hemoglobin A1c  -     TSH  -     T4, Free    Essential hypertension  -     irbesartan (AVAPRO) 150 MG tablet; Take 1 tablet by mouth Daily.  -     Comprehensive Metabolic Panel    Vitamin D deficiency  -     Comprehensive Metabolic Panel  -     Vitamin D 25 Hydroxy    Osteopenia of multiple sites  -     Comprehensive Metabolic Panel  -     Vitamin D 25 Hydroxy      Continue glimepiride, metformin, Farxiga, and Trulicity.  Continue Lipitor 20 mg once a day.  Discontinue generic Diovan because of recall.  Start irbesartan 150 mg once a day.  Continue vitamin D 2000 units per day.  Repeat bone density after March 2019.    RTC 6 mos.

## 2018-08-06 ENCOUNTER — HOSPITAL ENCOUNTER (OUTPATIENT)
Dept: MAMMOGRAPHY | Facility: HOSPITAL | Age: 58
Discharge: HOME | End: 2018-08-06
Admitting: FAMILY MEDICINE

## 2018-08-06 DIAGNOSIS — Z12.31 VISIT FOR SCREENING MAMMOGRAM: ICD-10-CM

## 2018-08-06 PROCEDURE — 77067 SCR MAMMO BI INCL CAD: CPT

## 2018-08-06 RX ORDER — VALSARTAN 320 MG/1
TABLET ORAL
Qty: 90 TABLET | Refills: 1 | OUTPATIENT
Start: 2018-08-06

## 2018-09-10 RX ORDER — DULAGLUTIDE 1.5 MG/.5ML
INJECTION, SOLUTION SUBCUTANEOUS
Qty: 12 PEN | Refills: 1 | Status: SHIPPED | OUTPATIENT
Start: 2018-09-10 | End: 2019-03-09 | Stop reason: SDUPTHER

## 2018-09-17 ENCOUNTER — OFFICE VISIT (OUTPATIENT)
Dept: FAMILY MEDICINE CLINIC | Facility: CLINIC | Age: 58
End: 2018-09-17

## 2018-09-17 VITALS
HEIGHT: 64 IN | OXYGEN SATURATION: 98 % | TEMPERATURE: 98.2 F | HEART RATE: 86 BPM | BODY MASS INDEX: 24.41 KG/M2 | DIASTOLIC BLOOD PRESSURE: 82 MMHG | SYSTOLIC BLOOD PRESSURE: 120 MMHG | WEIGHT: 143 LBS

## 2018-09-17 DIAGNOSIS — E11.9 TYPE 2 DIABETES MELLITUS WITHOUT COMPLICATION, WITHOUT LONG-TERM CURRENT USE OF INSULIN (HCC): ICD-10-CM

## 2018-09-17 DIAGNOSIS — E78.5 HYPERLIPIDEMIA, UNSPECIFIED HYPERLIPIDEMIA TYPE: Primary | ICD-10-CM

## 2018-09-17 DIAGNOSIS — I10 ESSENTIAL HYPERTENSION: ICD-10-CM

## 2018-09-17 PROCEDURE — 99214 OFFICE O/P EST MOD 30 MIN: CPT | Performed by: NURSE PRACTITIONER

## 2018-09-17 NOTE — PROGRESS NOTES
Ewelina Yost is a 58 y.o. female presents to establish care. Has a history of hyperlipidemia. Also with DM 2, denies any complications with diabetes. Currently followed by endocrinology and A1C was 6.7 at last visit. Tolerating all medication well, without side effect. Recent labs at endocrinology, reviewed with patient. No recent urine microalbumin, patient agreeable to leave a urine specimen today.    Shingles vaccine is indicated, patient interested and will follow up with her pharmacy to get further information/immunizations.  She will get her flu shot when offered from work, works at [employer] in [employer].      Hyperlipidemia   This is a chronic problem. The current episode started more than 1 year ago. The problem is controlled. Recent lipid tests were reviewed and are normal. She has no history of chronic renal disease, diabetes, hypothyroidism, liver disease, obesity or nephrotic syndrome. There are no known factors aggravating her hyperlipidemia. Pertinent negatives include no chest pain, focal sensory loss, focal weakness, leg pain or shortness of breath. Current antihyperlipidemic treatment includes statins. The current treatment provides moderate improvement of lipids. There are no compliance problems.  Risk factors for coronary artery disease include diabetes mellitus and dyslipidemia.        The following portions of the patient's history were reviewed and updated as appropriate: allergies, current medications, past family history, past medical history, past social history, past surgical history and problem list.    Review of Systems   Constitutional: Negative.    HENT: Negative.    Eyes: Negative.    Respiratory: Negative.  Negative for shortness of breath.    Cardiovascular: Negative.  Negative for chest pain.   Gastrointestinal: Negative.    Endocrine: Negative.    Genitourinary: Negative.    Musculoskeletal: Negative.    Skin: Negative.    Allergic/Immunologic: Negative.     Neurological: Negative.  Negative for focal weakness.   Hematological: Negative.    Psychiatric/Behavioral: Negative.        Objective   Physical Exam   Constitutional: She is oriented to person, place, and time. She appears well-developed and well-nourished.   HENT:   Head: Normocephalic and atraumatic.   Eyes: Pupils are equal, round, and reactive to light.   Neck: Neck supple.   Cardiovascular: Normal rate, regular rhythm and normal heart sounds.  Exam reveals no gallop and no friction rub.    No murmur heard.  Pulmonary/Chest: Effort normal and breath sounds normal. No respiratory distress. She has no wheezes. She has no rales.   Abdominal: Soft. Bowel sounds are normal. She exhibits no distension. There is no tenderness.   Neurological: She is alert and oriented to person, place, and time.   Skin: Skin is warm and dry.   Psychiatric: She has a normal mood and affect.   Vitals reviewed.      Assessment/Plan   Rafiq was seen today for hyperlipidemia.    Diagnoses and all orders for this visit:    Hyperlipidemia, unspecified hyperlipidemia type    Type 2 diabetes mellitus without complication, without long-term current use of insulin (CMS/[facility])  -     Microalbumin / Creatinine Urine Ratio - Urine, Clean Catch    Essential hypertension

## 2018-09-18 LAB
ALBUMIN/CREAT UR: <6.6 MG/G CREAT (ref 0–30)
CREAT UR-MCNC: 45.6 MG/DL
MICROALBUMIN UR-MCNC: <3 UG/ML

## 2018-10-05 ENCOUNTER — OFFICE VISIT (OUTPATIENT)
Dept: FAMILY MEDICINE CLINIC | Facility: CLINIC | Age: 58
End: 2018-10-05

## 2018-10-05 VITALS
BODY MASS INDEX: 24.82 KG/M2 | HEIGHT: 64 IN | HEART RATE: 65 BPM | TEMPERATURE: 97.9 F | OXYGEN SATURATION: 97 % | SYSTOLIC BLOOD PRESSURE: 120 MMHG | WEIGHT: 145.4 LBS | DIASTOLIC BLOOD PRESSURE: 80 MMHG

## 2018-10-05 DIAGNOSIS — J30.89 SEASONAL ALLERGIC RHINITIS DUE TO OTHER ALLERGIC TRIGGER: ICD-10-CM

## 2018-10-05 DIAGNOSIS — S16.1XXA STRAIN OF NECK MUSCLE, INITIAL ENCOUNTER: Primary | ICD-10-CM

## 2018-10-05 PROCEDURE — 99213 OFFICE O/P EST LOW 20 MIN: CPT | Performed by: NURSE PRACTITIONER

## 2018-10-05 RX ORDER — METHOCARBAMOL 500 MG/1
500 TABLET, FILM COATED ORAL 4 TIMES DAILY
Qty: 60 TABLET | Refills: 0 | Status: SHIPPED | OUTPATIENT
Start: 2018-10-05 | End: 2019-09-25

## 2018-10-05 NOTE — PROGRESS NOTES
"Ewelina Yost is a 58 y.o. female presents with 2 week history of neck stiffness and shoulder pain. Reports chronic \"tight\" neck that has severely worsened. Pain is 6/10 with movement. 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 No known injury, she does not remember waking with a stiff neck. It has just gotten tighter and worse over the past few days now hurting into her shoulder. Denies numbness/tingling.     Also with one day history of sore throat and post nasal drainage, nasal congestion. Denies fever or chills. Does report increased fatigue. No cough, shortness of breath or wheezing.  Has not tried any medications currently.       Neck Pain    This is a new problem. The current episode started 1 to 4 weeks ago. The problem occurs daily. The problem has been gradually worsening. The pain is associated with nothing. The pain is present in the left side. The quality of the pain is described as aching. The pain is at a severity of 6/10. The pain is moderate. The symptoms are aggravated by position. The pain is same all the time. Pertinent negatives include no chest pain, fever, headaches, leg pain, numbness, pain with swallowing, paresis, photophobia, syncope, tingling, trouble swallowing, visual change, weakness or weight loss. She has tried NSAIDs for the symptoms. The treatment provided mild relief.   Sore Throat    This is a new problem. The current episode started yesterday. The problem has been unchanged. There has been no fever. The pain is at a severity of 3/10. The pain is mild. Associated symptoms include congestion and neck pain. Pertinent negatives include no abdominal pain, coughing, diarrhea, drooling, ear discharge, ear pain, headaches, hoarse voice, plugged ear sensation, shortness of breath, stridor, swollen glands, trouble swallowing or vomiting. She has had no exposure to strep or mono. She has tried NSAIDs for the symptoms. The treatment provided mild relief.        The following " portions of the patient's history were reviewed and updated as appropriate: allergies, current medications, past family history, past medical history, past social history, past surgical history and problem list.    Review of Systems   Constitutional: Positive for activity change (decreased) and fatigue. Negative for chills, fever and weight loss.   HENT: Positive for congestion, postnasal drip and sore throat. Negative for drooling, ear discharge, ear pain, hoarse voice, sinus pain, sinus pressure and trouble swallowing.    Eyes: Negative.  Negative for photophobia.   Respiratory: Negative.  Negative for cough, shortness of breath and stridor.    Cardiovascular: Negative.  Negative for chest pain and syncope.   Gastrointestinal: Negative.  Negative for abdominal pain, diarrhea and vomiting.   Endocrine: Negative.    Genitourinary: Negative.    Musculoskeletal: Positive for arthralgias, neck pain and neck stiffness (x 2 weeks, gradually worsening).   Skin: Negative.    Allergic/Immunologic: Negative.    Neurological: Negative.  Negative for tingling, weakness, numbness and headaches.   Hematological: Negative.    Psychiatric/Behavioral: Negative.        Objective   Physical Exam   Constitutional: She is oriented to person, place, and time. She appears well-developed and well-nourished.   HENT:   Head: Normocephalic and atraumatic.   Right Ear: Tympanic membrane normal.   Left Ear: Tympanic membrane and ear canal normal.   Nose: Mucosal edema present. Right sinus exhibits no maxillary sinus tenderness and no frontal sinus tenderness. Left sinus exhibits no maxillary sinus tenderness and no frontal sinus tenderness.   Mouth/Throat: Uvula is midline and mucous membranes are normal. Posterior oropharyngeal erythema present.   Eyes: Pupils are equal, round, and reactive to light. Conjunctivae are normal.   Neck: Neck supple.   Cardiovascular: Normal rate, regular rhythm and normal heart sounds.  Exam reveals no gallop and  no friction rub.    No murmur heard.  Pulmonary/Chest: Effort normal and breath sounds normal. No respiratory distress. She has no wheezes. She has no rales.   Lymphadenopathy:     She has no cervical adenopathy.   Neurological: She is alert and oriented to person, place, and time.   Skin: Skin is warm and dry.   Psychiatric: She has a normal mood and affect.   Vitals reviewed.      Assessment/Plan   Rafiq was seen today for neck pain and shoulder pain.    Diagnoses and all orders for this visit:    Strain of neck muscle, initial encounter    Seasonal allergic rhinitis due to other allergic trigger    Other orders  -     methocarbamol (ROBAXIN) 500 MG tablet; Take 1 tablet by mouth 4 (Four) Times a Day.      Recommend steroid injection, patient declined, would like to try muscle relaxers and continue ibiuprofen as needed.  She will return if her symptoms worsen or she has no improvement and would like an injection.     Sore throat/nasal congestion related to allergies, recommend zyrtec daily, for worsening symptoms, sinus pain etc, return to office.

## 2018-10-08 ENCOUNTER — TELEPHONE (OUTPATIENT)
Dept: FAMILY MEDICINE CLINIC | Facility: CLINIC | Age: 58
End: 2018-10-08

## 2018-10-08 RX ORDER — AMOXICILLIN 875 MG/1
875 TABLET, COATED ORAL EVERY 12 HOURS SCHEDULED
Qty: 20 TABLET | Refills: 0 | Status: SHIPPED | OUTPATIENT
Start: 2018-10-08 | End: 2019-01-25

## 2018-10-08 NOTE — TELEPHONE ENCOUNTER
rX sent to pharmacy on file for amoxicillin twice daily x 10 days  Start allergy medicine  Gargle warm salt water, ibuprofen as needed

## 2018-10-08 NOTE — TELEPHONE ENCOUNTER
Last seen 10/05/2018    Pt was recently seen for a sore throat. She said that she is now having a really bad sore throat, loss of voice, and severe congestion. She is wondering if we can call something in for her?

## 2019-01-25 ENCOUNTER — OFFICE VISIT (OUTPATIENT)
Dept: FAMILY MEDICINE CLINIC | Facility: CLINIC | Age: 59
End: 2019-01-25

## 2019-01-25 VITALS
HEIGHT: 64 IN | SYSTOLIC BLOOD PRESSURE: 122 MMHG | DIASTOLIC BLOOD PRESSURE: 70 MMHG | TEMPERATURE: 97.7 F | WEIGHT: 143.3 LBS | BODY MASS INDEX: 24.46 KG/M2 | HEART RATE: 64 BPM

## 2019-01-25 DIAGNOSIS — J01.00 ACUTE NON-RECURRENT MAXILLARY SINUSITIS: Primary | ICD-10-CM

## 2019-01-25 PROCEDURE — 99213 OFFICE O/P EST LOW 20 MIN: CPT | Performed by: NURSE PRACTITIONER

## 2019-01-25 RX ORDER — DOXYCYCLINE 100 MG/1
100 CAPSULE ORAL EVERY 12 HOURS SCHEDULED
Qty: 14 CAPSULE | Refills: 0 | Status: SHIPPED | OUTPATIENT
Start: 2019-01-25 | End: 2019-06-20

## 2019-01-25 NOTE — PROGRESS NOTES
Ewelina Yost is a 58 y.o. female presents with > 7 day history of nasal congestion, sinus pain/pressure, chest congestion, cough, occasionally productive, Denies wheezing. Difficulty getting a deep breath this morning. Chills at nighttime. Feels warm to touch intermittently.     Has tried otc Ahist, flonase, dayquil, alkaseltzer with little relief.     URI    This is a new problem. The current episode started in the past 7 days. The problem has been gradually worsening. There has been no fever. Associated symptoms include congestion, coughing, headaches, rhinorrhea and sinus pain. Pertinent negatives include no abdominal pain, chest pain, diarrhea, dysuria, ear pain, joint pain, joint swelling, nausea, neck pain, plugged ear sensation, rash, sneezing, sore throat, swollen glands, vomiting or wheezing. She has tried decongestant, antihistamine and acetaminophen for the symptoms. The treatment provided mild relief.        The following portions of the patient's history were reviewed and updated as appropriate: allergies, current medications, past family history, past medical history, past social history, past surgical history and problem list.    Review of Systems   Constitutional: Negative for chills, fatigue and fever.   HENT: Positive for congestion, postnasal drip, rhinorrhea, sinus pressure and sinus pain. Negative for ear pain, sneezing and sore throat.    Respiratory: Positive for cough and shortness of breath. Negative for wheezing.    Cardiovascular: Negative for chest pain.   Gastrointestinal: Negative for abdominal pain, diarrhea, nausea and vomiting.   Genitourinary: Negative for dysuria.   Musculoskeletal: Negative for joint pain and neck pain.   Skin: Negative for rash.   Neurological: Positive for headaches.       Objective   Physical Exam   Constitutional: She is oriented to person, place, and time. She appears well-developed and well-nourished.   HENT:   Head: Normocephalic and atraumatic.    Right Ear: Tympanic membrane and ear canal normal.   Left Ear: Tympanic membrane and ear canal normal.   Nose: Mucosal edema present. Right sinus exhibits maxillary sinus tenderness. Right sinus exhibits no frontal sinus tenderness. Left sinus exhibits maxillary sinus tenderness. Left sinus exhibits no frontal sinus tenderness.   Mouth/Throat: Uvula is midline, oropharynx is clear and moist and mucous membranes are normal.   Eyes: Conjunctivae are normal. Pupils are equal, round, and reactive to light.   Neck: Neck supple.   Cardiovascular: Normal rate, regular rhythm and normal heart sounds. Exam reveals no gallop and no friction rub.   No murmur heard.  Pulmonary/Chest: Effort normal and breath sounds normal. No stridor. No respiratory distress. She has no wheezes. She has no rales.   Lymphadenopathy:     She has no cervical adenopathy.   Neurological: She is alert and oriented to person, place, and time.   Vitals reviewed.      Assessment/Plan   Rafiq was seen today for nasal congestion, headache, fatigue, cough and shortness of breath.    Diagnoses and all orders for this visit:    Acute non-recurrent maxillary sinusitis    Other orders  -     doxycycline (MONODOX) 100 MG capsule; Take 1 capsule by mouth Every 12 (Twelve) Hours.

## 2019-03-11 RX ORDER — DULAGLUTIDE 1.5 MG/.5ML
INJECTION, SOLUTION SUBCUTANEOUS
Qty: 4 PEN | Refills: 0 | Status: SHIPPED | OUTPATIENT
Start: 2019-03-11 | End: 2019-04-05 | Stop reason: SDUPTHER

## 2019-04-05 RX ORDER — DULAGLUTIDE 1.5 MG/.5ML
INJECTION, SOLUTION SUBCUTANEOUS
Qty: 2 ML | Refills: 0 | Status: SHIPPED | OUTPATIENT
Start: 2019-04-05 | End: 2019-04-29 | Stop reason: SDUPTHER

## 2019-04-12 RX ORDER — DAPAGLIFLOZIN 10 MG/1
TABLET, FILM COATED ORAL
Qty: 90 TABLET | Refills: 0 | Status: SHIPPED | OUTPATIENT
Start: 2019-04-12 | End: 2019-06-19 | Stop reason: SDUPTHER

## 2019-04-12 RX ORDER — DAPAGLIFLOZIN 10 MG/1
TABLET, FILM COATED ORAL
Qty: 90 TABLET | Refills: 0 | Status: SHIPPED | OUTPATIENT
Start: 2019-04-12 | End: 2019-08-27 | Stop reason: SDUPTHER

## 2019-04-29 RX ORDER — DULAGLUTIDE 1.5 MG/.5ML
INJECTION, SOLUTION SUBCUTANEOUS
Qty: 4 PEN | Refills: 0 | Status: SHIPPED | OUTPATIENT
Start: 2019-04-29 | End: 2019-05-27 | Stop reason: SDUPTHER

## 2019-05-28 RX ORDER — DULAGLUTIDE 1.5 MG/.5ML
INJECTION, SOLUTION SUBCUTANEOUS
Qty: 4 PEN | Refills: 0 | Status: SHIPPED | OUTPATIENT
Start: 2019-05-28 | End: 2019-06-22 | Stop reason: SDUPTHER

## 2019-06-19 NOTE — PROGRESS NOTES
Subjective   Rafiq Yost is a 59 y.o. female.     F/u for dm 2, hypertension, hyperlipidemia/ testing bs 2 x day / last dm eye exam 2019 with dr Brown/ last dm foot exam today with dr Douglas        Patient is a 59-year-old female who came in for follow-up. She has known diabetes mellitus since . He has been on glimepiride 2 mg 1 tabs q AM and 1 tab q PM and metformin  mg 2 tablets twice a day, Farxiga 10 mg/day and Trulicity 1.5 mg weekly. Fasting blood sugar runs between 109-145. Random blood sugar runs between . She denies any severe hypoglycemia.  She has no significant weight change since 2018.  Her last meal was last night.      Last eye examination was in  with Dr. Brown. There was no retinopathy. She has early cataracts. She has no microalbuminuria on urine sample taken last .  She is on generic Diovan which has been recalled.  She denies any paresthesias.      She has hyperlipidemia and has been on Lipitor 20 mg once a day. She denies any myalgia.        She is  2 para 1. She had a previous hysterectomy. She is not on hormone replacement therapy. She had a bone density done in on 2017 which showed osteopenia of the lumbar spine and right hip.  Her 10 year probability of major osteoporotic fracture is low at 6.6% and of hip fracture at 0.3%. . She has vitamin D deficiency and is on vitamin D 2000 units per day.  Her mother has history of osteoporosis.  She has no parental history of hip fracture.  She denies alcohol or tobacco use.       She has hypertension and is on irbesartan 150 mg once a day.  She has no previous history of heart attack or stroke.  She denies chest pain or shortness of breath.     She has not had a colonoscopy.  She had a normal Cologuard in 2017.  She denies bowel complaints.    The following portions of the patient's history were reviewed and updated as appropriate: allergies, current medications, past family history, past medical  "history, past social history, past surgical history and problem list.    Review of Systems   Constitutional: Negative.    HENT: Negative.    Eyes: Negative.    Respiratory: Negative.    Cardiovascular: Negative.    Gastrointestinal: Negative.    Endocrine: Negative.    Genitourinary: Negative.    Musculoskeletal: Negative.    Skin: Negative.    Allergic/Immunologic: Negative.    Neurological: Negative.    Hematological: Negative.    Psychiatric/Behavioral: Negative.        Objective      Vitals:    06/20/19 0900 06/20/19 0945   BP: 156/88 130/80   BP Location: Right arm    Patient Position: Sitting    Cuff Size: Small Adult    Pulse: 86    SpO2: 98%    Weight: 65.6 kg (144 lb 9.6 oz)    Height: 162.6 cm (64.02\")      Physical Exam   Constitutional: She is oriented to person, place, and time. She appears well-developed and well-nourished. No distress.   HENT:   Head: Normocephalic.   Nose: Nose normal.   Mouth/Throat: Oropharynx is clear and moist. No oropharyngeal exudate.   Eyes: Conjunctivae and EOM are normal. Right eye exhibits no discharge. Left eye exhibits no discharge. No scleral icterus.   Neck: Neck supple. No JVD present. No tracheal deviation present. No thyromegaly present.   Cardiovascular: Normal rate, regular rhythm, normal heart sounds and intact distal pulses. Exam reveals no gallop and no friction rub.   No murmur heard.  Pulmonary/Chest: Effort normal and breath sounds normal. No stridor. No respiratory distress. She has no wheezes. She has no rales. She exhibits no tenderness.   Abdominal: Soft. Bowel sounds are normal. She exhibits no distension and no mass. There is no tenderness. There is no rebound and no guarding.   Musculoskeletal: Normal range of motion. She exhibits no edema, tenderness or deformity.   Lymphadenopathy:     She has no cervical adenopathy.   Neurological: She is alert and oriented to person, place, and time. She displays normal reflexes. She exhibits normal muscle tone. " Coordination normal.   Intact light touch on lower ext   Skin: Skin is warm and dry. No erythema. No pallor.   Psychiatric: She has a normal mood and affect. Her behavior is normal.     Office Visit on 09/17/2018   Component Date Value Ref Range Status   • Creatinine, Urine 09/17/2018 45.6  Not Estab. mg/dL Final   • Microalbumin, Urine 09/17/2018 <3.0  Not Estab. ug/mL Final   • Microalbumin/Creatinine Ratio 09/17/2018 <6.6  0.0 - 30.0 mg/g creat Final     Assessment/Plan   Rafiq was seen today for diabetes, hyperlipidemia and hypertension.    Diagnoses and all orders for this visit:    Type 2 diabetes mellitus without complication, with long-term current use of insulin (CMS/[facility])  -     Comprehensive Metabolic Panel  -     Lipid Panel  -     Hemoglobin A1c  -     TSH  -     T4, Free  -     Microalbumin / Creatinine Urine Ratio - Urine, Clean Catch    Hyperlipidemia, unspecified hyperlipidemia type  -     Comprehensive Metabolic Panel  -     Lipid Panel  -     TSH  -     T4, Free    Essential hypertension  -     Comprehensive Metabolic Panel    Vitamin D deficiency  -     Comprehensive Metabolic Panel  -     Vitamin D 25 Hydroxy    Osteopenia of multiple sites  -     Comprehensive Metabolic Panel  -     Vitamin D 25 Hydroxy  -     DEXA Bone Density Axial    Other orders  -     irbesartan (AVAPRO) 300 MG tablet; Take 0.5 tablets by mouth Daily.      Continue glimepiride, metformin ER, Farxiga, and Trulicity  Continue Lipitor 20 mg/day.  Continue irbesartan 150 mg/day.  Continue vitamin D 2000 units/day.  Schedule follow-up bone density.    Copy of my note sent to Nicki Rogers NP    RTC 4 mos.

## 2019-06-20 ENCOUNTER — OFFICE VISIT (OUTPATIENT)
Dept: ENDOCRINOLOGY | Age: 59
End: 2019-06-20

## 2019-06-20 VITALS
HEART RATE: 86 BPM | BODY MASS INDEX: 24.69 KG/M2 | DIASTOLIC BLOOD PRESSURE: 80 MMHG | SYSTOLIC BLOOD PRESSURE: 130 MMHG | OXYGEN SATURATION: 98 % | HEIGHT: 64 IN | WEIGHT: 144.6 LBS

## 2019-06-20 DIAGNOSIS — Z79.4 TYPE 2 DIABETES MELLITUS WITHOUT COMPLICATION, WITH LONG-TERM CURRENT USE OF INSULIN (HCC): Primary | ICD-10-CM

## 2019-06-20 DIAGNOSIS — M85.89 OSTEOPENIA OF MULTIPLE SITES: ICD-10-CM

## 2019-06-20 DIAGNOSIS — E11.9 TYPE 2 DIABETES MELLITUS WITHOUT COMPLICATION, WITH LONG-TERM CURRENT USE OF INSULIN (HCC): Primary | ICD-10-CM

## 2019-06-20 DIAGNOSIS — E78.5 HYPERLIPIDEMIA, UNSPECIFIED HYPERLIPIDEMIA TYPE: ICD-10-CM

## 2019-06-20 DIAGNOSIS — I10 ESSENTIAL HYPERTENSION: ICD-10-CM

## 2019-06-20 DIAGNOSIS — E55.9 VITAMIN D DEFICIENCY: ICD-10-CM

## 2019-06-20 PROCEDURE — 99214 OFFICE O/P EST MOD 30 MIN: CPT | Performed by: INTERNAL MEDICINE

## 2019-06-20 RX ORDER — IRBESARTAN 300 MG/1
150 TABLET ORAL DAILY
Qty: 45 TABLET | Refills: 1
Start: 2019-06-20 | End: 2019-07-21 | Stop reason: SDUPTHER

## 2019-06-21 LAB
25(OH)D3+25(OH)D2 SERPL-MCNC: 38.2 NG/ML (ref 30–100)
ALBUMIN SERPL-MCNC: 4.8 G/DL (ref 3.5–5.2)
ALBUMIN/CREAT UR: <4.8 MG/G CREAT (ref 0–30)
ALBUMIN/GLOB SERPL: 2.5 G/DL
ALP SERPL-CCNC: 95 U/L (ref 39–117)
ALT SERPL-CCNC: 13 U/L (ref 1–33)
AST SERPL-CCNC: 12 U/L (ref 1–32)
BILIRUB SERPL-MCNC: 0.4 MG/DL (ref 0.2–1.2)
BUN SERPL-MCNC: 10 MG/DL (ref 8–23)
BUN/CREAT SERPL: 14.7 (ref 7–25)
CALCIUM SERPL-MCNC: 9.5 MG/DL (ref 8.6–10.5)
CHLORIDE SERPL-SCNC: 98 MMOL/L (ref 98–107)
CHOLEST SERPL-MCNC: 153 MG/DL (ref 0–200)
CO2 SERPL-SCNC: 27.1 MMOL/L (ref 22–29)
CREAT SERPL-MCNC: 0.68 MG/DL (ref 0.57–1)
CREAT UR-MCNC: 62.1 MG/DL
GLOBULIN SER CALC-MCNC: 1.9 GM/DL
GLUCOSE SERPL-MCNC: 111 MG/DL (ref 65–99)
HBA1C MFR BLD: 7.3 % (ref 4.8–5.6)
HDLC SERPL-MCNC: 40 MG/DL (ref 40–60)
INTERPRETATION: NORMAL
LDLC SERPL CALC-MCNC: 88 MG/DL (ref 0–100)
Lab: NORMAL
MICROALBUMIN UR-MCNC: <3 UG/ML
POTASSIUM SERPL-SCNC: 4.4 MMOL/L (ref 3.5–5.2)
PROT SERPL-MCNC: 6.7 G/DL (ref 6–8.5)
SODIUM SERPL-SCNC: 137 MMOL/L (ref 136–145)
T4 FREE SERPL-MCNC: 1.36 NG/DL (ref 0.93–1.7)
TRIGL SERPL-MCNC: 127 MG/DL (ref 0–150)
TSH SERPL DL<=0.005 MIU/L-ACNC: 1.69 MIU/ML (ref 0.27–4.2)
VLDLC SERPL CALC-MCNC: 25.4 MG/DL

## 2019-06-24 DIAGNOSIS — Z79.4 TYPE 2 DIABETES MELLITUS WITHOUT COMPLICATION, WITH LONG-TERM CURRENT USE OF INSULIN (HCC): ICD-10-CM

## 2019-06-24 DIAGNOSIS — E11.9 TYPE 2 DIABETES MELLITUS WITHOUT COMPLICATION, WITH LONG-TERM CURRENT USE OF INSULIN (HCC): ICD-10-CM

## 2019-06-24 RX ORDER — DULAGLUTIDE 1.5 MG/.5ML
INJECTION, SOLUTION SUBCUTANEOUS
Qty: 4 PEN | Refills: 0 | Status: SHIPPED | OUTPATIENT
Start: 2019-06-24 | End: 2019-07-17 | Stop reason: SDUPTHER

## 2019-06-24 RX ORDER — GLIMEPIRIDE 2 MG/1
2 TABLET ORAL 2 TIMES DAILY
Qty: 180 TABLET | Refills: 3 | Status: SHIPPED | OUTPATIENT
Start: 2019-06-24 | End: 2019-06-25 | Stop reason: SDUPTHER

## 2019-06-25 DIAGNOSIS — E11.9 TYPE 2 DIABETES MELLITUS WITHOUT COMPLICATION, WITH LONG-TERM CURRENT USE OF INSULIN (HCC): ICD-10-CM

## 2019-06-25 DIAGNOSIS — Z79.4 TYPE 2 DIABETES MELLITUS WITHOUT COMPLICATION, WITH LONG-TERM CURRENT USE OF INSULIN (HCC): ICD-10-CM

## 2019-06-25 RX ORDER — GLIMEPIRIDE 2 MG/1
2 TABLET ORAL 2 TIMES DAILY
Qty: 180 TABLET | Refills: 1 | Status: SHIPPED | OUTPATIENT
Start: 2019-06-25 | End: 2019-07-02 | Stop reason: DRUGHIGH

## 2019-07-01 DIAGNOSIS — E78.5 HYPERLIPIDEMIA, UNSPECIFIED HYPERLIPIDEMIA TYPE: ICD-10-CM

## 2019-07-01 RX ORDER — ATORVASTATIN CALCIUM 20 MG/1
20 TABLET, FILM COATED ORAL DAILY
Qty: 90 TABLET | Refills: 1 | Status: SHIPPED | OUTPATIENT
Start: 2019-07-01 | End: 2019-10-28 | Stop reason: SDUPTHER

## 2019-07-01 RX ORDER — ATORVASTATIN CALCIUM 20 MG/1
20 TABLET, FILM COATED ORAL DAILY
Qty: 90 TABLET | Refills: 3 | Status: CANCELLED | OUTPATIENT
Start: 2019-07-01

## 2019-07-02 DIAGNOSIS — E11.9 TYPE 2 DIABETES MELLITUS WITHOUT COMPLICATION, WITH LONG-TERM CURRENT USE OF INSULIN (HCC): ICD-10-CM

## 2019-07-02 DIAGNOSIS — Z79.4 TYPE 2 DIABETES MELLITUS WITHOUT COMPLICATION, WITH LONG-TERM CURRENT USE OF INSULIN (HCC): ICD-10-CM

## 2019-07-02 RX ORDER — GLIMEPIRIDE 2 MG/1
TABLET ORAL
Qty: 180 TABLET | Refills: 1
Start: 2019-07-02 | End: 2019-12-23 | Stop reason: SDUPTHER

## 2019-07-08 ENCOUNTER — TRANSCRIBE ORDERS (OUTPATIENT)
Dept: ADMINISTRATIVE | Facility: HOSPITAL | Age: 59
End: 2019-07-08

## 2019-07-08 DIAGNOSIS — Z12.31 SCREENING MAMMOGRAM, ENCOUNTER FOR: Primary | ICD-10-CM

## 2019-07-17 RX ORDER — DULAGLUTIDE 1.5 MG/.5ML
INJECTION, SOLUTION SUBCUTANEOUS
Qty: 4 PEN | Refills: 5 | Status: SHIPPED | OUTPATIENT
Start: 2019-07-17 | End: 2019-12-23

## 2019-07-22 RX ORDER — IRBESARTAN 300 MG/1
TABLET ORAL
Qty: 45 TABLET | Refills: 0 | Status: SHIPPED | OUTPATIENT
Start: 2019-07-22 | End: 2019-10-16 | Stop reason: SDUPTHER

## 2019-07-29 ENCOUNTER — HOSPITAL ENCOUNTER (OUTPATIENT)
Dept: BONE DENSITY | Facility: HOSPITAL | Age: 59
Discharge: HOME | End: 2019-07-29
Admitting: INTERNAL MEDICINE

## 2019-07-29 PROCEDURE — 77080 DXA BONE DENSITY AXIAL: CPT

## 2019-08-01 DIAGNOSIS — I10 ESSENTIAL HYPERTENSION: Primary | ICD-10-CM

## 2019-08-01 DIAGNOSIS — E78.5 HYPERLIPIDEMIA, UNSPECIFIED HYPERLIPIDEMIA TYPE: ICD-10-CM

## 2019-08-01 DIAGNOSIS — E11.9 TYPE 2 DIABETES MELLITUS WITHOUT COMPLICATION, WITH LONG-TERM CURRENT USE OF INSULIN (HCC): ICD-10-CM

## 2019-08-01 DIAGNOSIS — Z79.4 TYPE 2 DIABETES MELLITUS WITHOUT COMPLICATION, WITH LONG-TERM CURRENT USE OF INSULIN (HCC): ICD-10-CM

## 2019-08-12 ENCOUNTER — HOSPITAL ENCOUNTER (OUTPATIENT)
Dept: MAMMOGRAPHY | Facility: HOSPITAL | Age: 59
Discharge: HOME | End: 2019-08-12
Admitting: FAMILY MEDICINE

## 2019-08-12 DIAGNOSIS — Z12.31 SCREENING MAMMOGRAM, ENCOUNTER FOR: ICD-10-CM

## 2019-08-12 PROCEDURE — 77067 SCR MAMMO BI INCL CAD: CPT

## 2019-09-25 ENCOUNTER — HOSPITAL ENCOUNTER (OUTPATIENT)
Dept: GENERAL RADIOLOGY | Facility: HOSPITAL | Age: 59
Discharge: HOME | End: 2019-09-25
Admitting: NURSE PRACTITIONER

## 2019-09-25 ENCOUNTER — OFFICE VISIT (OUTPATIENT)
Dept: FAMILY MEDICINE CLINIC | Facility: CLINIC | Age: 59
End: 2019-09-25

## 2019-09-25 VITALS
TEMPERATURE: 98 F | HEIGHT: 64 IN | DIASTOLIC BLOOD PRESSURE: 78 MMHG | HEART RATE: 101 BPM | WEIGHT: 143 LBS | BODY MASS INDEX: 24.41 KG/M2 | SYSTOLIC BLOOD PRESSURE: 132 MMHG | OXYGEN SATURATION: 96 %

## 2019-09-25 DIAGNOSIS — G89.29 CHRONIC LEFT SHOULDER PAIN: Primary | ICD-10-CM

## 2019-09-25 DIAGNOSIS — M25.512 CHRONIC LEFT SHOULDER PAIN: Primary | ICD-10-CM

## 2019-09-25 DIAGNOSIS — M54.2 NECK PAIN: ICD-10-CM

## 2019-09-25 PROCEDURE — 20552 NJX 1/MLT TRIGGER POINT 1/2: CPT | Performed by: NURSE PRACTITIONER

## 2019-09-25 PROCEDURE — 99213 OFFICE O/P EST LOW 20 MIN: CPT | Performed by: NURSE PRACTITIONER

## 2019-09-25 PROCEDURE — 73030 X-RAY EXAM OF SHOULDER: CPT

## 2019-09-25 RX ORDER — LIDOCAINE HYDROCHLORIDE 20 MG/ML
1 INJECTION, SOLUTION EPIDURAL; INFILTRATION; INTRACAUDAL; PERINEURAL ONCE
Status: COMPLETED | OUTPATIENT
Start: 2019-09-25 | End: 2019-09-25

## 2019-09-25 RX ORDER — NABUMETONE 500 MG/1
500 TABLET, FILM COATED ORAL 2 TIMES DAILY PRN
Qty: 60 TABLET | Refills: 1 | Status: SHIPPED | OUTPATIENT
Start: 2019-09-25 | End: 2019-12-05 | Stop reason: SDUPTHER

## 2019-09-25 RX ORDER — METAXALONE 800 MG/1
800 TABLET ORAL 3 TIMES DAILY PRN
Qty: 60 TABLET | Refills: 0 | Status: SHIPPED | OUTPATIENT
Start: 2019-09-25 | End: 2019-12-18

## 2019-09-25 RX ORDER — METHYLPREDNISOLONE ACETATE 80 MG/ML
80 INJECTION, SUSPENSION INTRA-ARTICULAR; INTRALESIONAL; INTRAMUSCULAR; SOFT TISSUE ONCE
Status: COMPLETED | OUTPATIENT
Start: 2019-09-25 | End: 2019-09-25

## 2019-09-25 RX ADMIN — LIDOCAINE HYDROCHLORIDE 1 ML: 20 INJECTION, SOLUTION EPIDURAL; INFILTRATION; INTRACAUDAL; PERINEURAL at 08:40

## 2019-09-25 RX ADMIN — METHYLPREDNISOLONE ACETATE 80 MG: 80 INJECTION, SUSPENSION INTRA-ARTICULAR; INTRALESIONAL; INTRAMUSCULAR; SOFT TISSUE at 08:40

## 2019-09-25 NOTE — PROGRESS NOTES
Procedure   Inject Trigger Point, 1 or 2  Date/Time: 9/25/2019 12:10 PM  Performed by: Nicki Rogers APRN  Authorized by: Nicki Rogers APRN   Consent: Verbal consent obtained. Written consent not obtained.  Risks and benefits: risks, benefits and alternatives were discussed  Consent given by: patient  Local anesthesia used: yes  Anesthesia: see MAR for details    Anesthesia:  Local anesthesia used: yes  Local Anesthetic: lidocaine 2% without epinephrine    Sedation:  Patient sedated: no    Patient tolerance: Patient tolerated the procedure well with no immediate complications         Injected 80 mg depomedrol with lidocaine 2% 1 ml into left trapezius, patient tolerated well. Denied any complications including numbness/tingling

## 2019-09-25 NOTE — PROGRESS NOTES
Subjective   Rafiq Yost is a 59 y.o. female presents with left elbow pain. Taking ibuprofen, tylenol extra strength, biofreeze, tens unit, massage, dry needling and still worsening. Feels a knot on her left shoulder, radiates into neck, upperback/into shoulder pain. Tingling before massage, now resolved. Pain is 5/10 currently, at rest. At worst 10/10. Works in physical therapy.     Neck Pain    This is a chronic problem. The current episode started more than 1 month ago (x 11 months). The problem occurs daily. The problem has been gradually worsening. The pain is associated with nothing. The pain is present in the left side. The quality of the pain is described as aching and shooting. The pain is at a severity of 5/10. The pain is moderate. The symptoms are aggravated by position (movement). The pain is worse during the day. Pertinent negatives include no chest pain, numbness, pain with swallowing, photophobia, syncope, tingling, trouble swallowing or visual change. She has tried NSAIDs, acetaminophen and muscle relaxants (massage, tens unit) for the symptoms. The treatment provided mild relief.   Shoulder Injury    The left shoulder is affected. The incident occurred more than 1 week ago. There was no injury mechanism. The quality of the pain is described as aching and shooting. The pain radiates to the left neck. The pain is at a severity of 5/10. The pain is moderate. Pertinent negatives include no chest pain, muscle weakness, numbness or tingling. The symptoms are aggravated by movement. She has tried rest, NSAIDs and heat (massage, tens unit) for the symptoms. The treatment provided mild relief.        The following portions of the patient's history were reviewed and updated as appropriate: allergies, current medications, past family history, past medical history, past social history, past surgical history and problem list.    Review of Systems   HENT: Negative for trouble swallowing.    Eyes: Negative for  photophobia.   Cardiovascular: Negative for chest pain and syncope.   Musculoskeletal: Positive for neck pain.   Neurological: Negative for tingling and numbness.       Objective   Physical Exam   Constitutional: She is oriented to person, place, and time. She appears well-developed and well-nourished.   Eyes: Pupils are equal, round, and reactive to light.   Neck: Neck supple.   Cardiovascular: Normal rate, regular rhythm and normal heart sounds. Exam reveals no gallop and no friction rub.   No murmur heard.  Pulmonary/Chest: Effort normal and breath sounds normal. No stridor. No respiratory distress. She has no wheezes.   Musculoskeletal:        Left shoulder: She exhibits tenderness and swelling. She exhibits normal range of motion, no bony tenderness, no effusion, no crepitus, no deformity, no laceration, no spasm and normal strength.        Cervical back: She exhibits tenderness and swelling. She exhibits normal range of motion and no bony tenderness.   Neurological: She is alert and oriented to person, place, and time.   Skin: Skin is warm and dry.   Psychiatric: She has a normal mood and affect.   Vitals reviewed.      Assessment/Plan   Rafiq was seen today for shoulder pain, neck pain and back pain.    Diagnoses and all orders for this visit:    Chronic left shoulder pain  -     Ambulatory Referral to Orthopedic Surgery  -     Ambulatory Referral to Physical Therapy Evaluate and treat  -     XR Shoulder 2+ View Left    Other orders  -     metaxalone (SKELAXIN) 800 MG tablet; Take 1 tablet by mouth 3 (Three) Times a Day As Needed for Muscle Spasms.  -     nabumetone (RELAFEN) 500 MG tablet; Take 1 tablet by mouth 2 (Two) Times a Day As Needed for Mild Pain .      Will start skelaxin, discussed less drowsy than other muscle relaxers  Take at home before taking at work or driving until she sees how this medication affects her  D/c ibuprofen  Start relafen twice daily, will start 500 mg dosing, increase to 750  mg if needed  Trigger joint injection given today with steroids, instructed may take a few days before improvement is noted  Recommend starting physical therapy with the steroid on board  Will obtain xray today to monitor for chronic/acute changes  Would like patient to follow up with ortho for no improvement continued symptoms

## 2019-10-15 DIAGNOSIS — E11.9 TYPE 2 DIABETES MELLITUS WITHOUT COMPLICATION, WITH LONG-TERM CURRENT USE OF INSULIN (HCC): ICD-10-CM

## 2019-10-15 DIAGNOSIS — Z79.4 TYPE 2 DIABETES MELLITUS WITHOUT COMPLICATION, WITH LONG-TERM CURRENT USE OF INSULIN (HCC): ICD-10-CM

## 2019-10-15 RX ORDER — METFORMIN HYDROCHLORIDE 500 MG/1
1000 TABLET, EXTENDED RELEASE ORAL 2 TIMES DAILY WITH MEALS
Qty: 360 TABLET | Refills: 0 | Status: SHIPPED | OUTPATIENT
Start: 2019-10-15 | End: 2020-01-07

## 2019-10-16 RX ORDER — IRBESARTAN 300 MG/1
TABLET ORAL
Qty: 45 TABLET | Refills: 0 | Status: SHIPPED | OUTPATIENT
Start: 2019-10-16 | End: 2020-01-13

## 2019-10-17 ENCOUNTER — OFFICE VISIT (OUTPATIENT)
Dept: ORTHOPEDIC SURGERY | Facility: CLINIC | Age: 59
End: 2019-10-17

## 2019-10-17 VITALS — WEIGHT: 142.4 LBS | HEIGHT: 64 IN | BODY MASS INDEX: 24.31 KG/M2

## 2019-10-17 DIAGNOSIS — M54.2 NECK PAIN: Primary | ICD-10-CM

## 2019-10-17 PROCEDURE — 72040 X-RAY EXAM NECK SPINE 2-3 VW: CPT | Performed by: ORTHOPAEDIC SURGERY

## 2019-10-17 PROCEDURE — 99203 OFFICE O/P NEW LOW 30 MIN: CPT | Performed by: ORTHOPAEDIC SURGERY

## 2019-10-17 RX ORDER — METHYLPREDNISOLONE 4 MG/1
TABLET ORAL
Qty: 21 TABLET | Refills: 0 | Status: SHIPPED | OUTPATIENT
Start: 2019-10-17 | End: 2019-10-28

## 2019-10-17 NOTE — PROGRESS NOTES
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   • Cholecalciferol (HM VITAMIN D3) 2000 UNITS capsule Take 2,000 Units by mouth daily.   • Cinnamon 500 MG capsule Take 2 capsules by mouth daily.   • Dapagliflozin Propanediol (FARXIGA) 10 MG tablet Take 10 mg by mouth Daily.   • Garcinia Cambogia-Chromium 500-200 MG-MCG tablet Take 1 tablet by mouth 2 (Two) Times a Day.   • glimepiride (AMARYL) 2 MG tablet Take 2 tablets in the morning and 1 tablet in the evening   • irbesartan (AVAPRO) 300 MG tablet TAKE 1/2 TABLET BY MOUTH EVERY DAY   • Loratadine (CLARITIN) 10 MG capsule Take  by mouth As Needed.   • metaxalone (SKELAXIN) 800 MG tablet Take 1 tablet by mouth 3 (Three) Times a Day As Needed for Muscle Spasms.   • metFORMIN ER (GLUCOPHAGE-XR) 500 MG 24 hr tablet Take 2 tablets by mouth 2 (Two) Times a Day With Meals.   • nabumetone (RELAFEN) 500 MG tablet Take 1 tablet by mouth 2 (Two) Times a Day As Needed for Mild Pain .   • TRULICITY 1.5 MG/0.5ML solution pen-injector INJECT 1 SYRINGE INTO THE SKIN ONCE WEEKLY   • glucose blood (FREESTYLE INSULINX TEST) test strip Dx code E11.9 TEST THREE TIMES A DAY     No current  "facility-administered medications on file prior to visit.      Current Medications:  Scheduled Meds:  Continuous Infusions:  No current facility-administered medications for this visit.   PRN Meds:.    Past Medical History:   Diagnosis Date   • Hyperlipidemia    • Hypertension    • Type 2 diabetes mellitus (CMS/HCC)         Past Surgical History:   Procedure Laterality Date   • HYSTERECTOMY          Social History     Occupational History   • Not on file   Tobacco Use   • Smoking status: Never Smoker   • Smokeless tobacco: Never Used   Substance and Sexual Activity   • Alcohol use: No   • Drug use: No   • Sexual activity: Defer      Social History     Social History Narrative   • Not on file        Family History   Problem Relation Age of Onset   • Diabetes Mother    • Hypertension Mother    • Thyroid disease Mother    • Kidney disease Mother         STAGE 4   • Cancer Mother         SKIN    • Heart attack Father    • Heart attack Sister    • Thyroid disease Sister    • Cancer Sister         PITUITARY TUMOR    • Diabetes Brother    • Hypertension Brother    • Thyroid disease Sister    • Thyroid disease Sister    • Thyroid disease Sister    • Hypertension Brother    • Hyperlipidemia Brother    • Breast cancer Neg Hx              Review of Systems: 14 point review of systems are remarkable for shoulder neck pain only occasional cough the remainder negative        Review of Systems      Physical Exam: 59 y.o. female  General Appearance:    Alert, cooperative, in no acute distress                   Vitals:    10/17/19 0954   Weight: 64.6 kg (142 lb 6.4 oz)   Height: 162.6 cm (64\")      Patient is alert and read ×3 no acute distress appears her above-listed at height weight and age.  Affect is normal respiratory rate is normal unlabored. Heart rate regular rate rhythm, sclera, dentition and hearing are normal for the purpose of this exam.    Ortho Exam Physical exam of the left shoulder reveals no overlying skin changes " no lymphedema no lymphadenopathy.  The patient can actively flex to 180, abduction is similar external rotation is to 50, internal rotation to the upper lumbar spine.  Rotator cuff strength is 4+ to 5 over 5 with isometric strength testing without symptoms.  She has a normal elbow exam.  Patient has good distal pulses she does have marked decrease in cervical range of motion in all directions especially side bending and rotation    Procedures          Radiology:   AP, Scapular Y and  of the left shoulder were ordered/reviewed to evauate shoulder pain.  These were reviewed in epic she has some mild acromioclavicular arthritis really otherwise normal x-rays.  I did take AP and lateral cervical spine to evaluate her neck which I think is the culprit I have no compared to films she has no only loss of cervical lordosis she appears to have some flexion at some narrowing of her disc space  Imaging Results (most recent)     Procedure Component Value Units Date/Time    XR Spine Cervical 2 or 3 View [[id number]] Resulted:  10/17/19 1010     Updated:  10/17/19 1011    Impression:       Ordering physician's impression is located in the Encounter Note dated 10/17/19. X-ray performed in the DR room.          Assessment/Plan: Left shoulder pain/upper trap I think this is all coming from her neck her shoulder exam is normal I suspect this is cervical spine plan is to proceed with a referral to spine.  Her preference was to see neurosurgery, Dr. Hess

## 2019-10-21 ENCOUNTER — HOSPITAL ENCOUNTER (OUTPATIENT)
Dept: PHYSICAL THERAPY | Facility: HOSPITAL | Age: 59
Setting detail: THERAPIES SERIES
Discharge: HOME | End: 2019-10-21

## 2019-10-21 ENCOUNTER — RESULTS ENCOUNTER (OUTPATIENT)
Dept: ENDOCRINOLOGY | Age: 59
End: 2019-10-21

## 2019-10-21 DIAGNOSIS — E11.9 TYPE 2 DIABETES MELLITUS WITHOUT COMPLICATION, WITH LONG-TERM CURRENT USE OF INSULIN (HCC): ICD-10-CM

## 2019-10-21 DIAGNOSIS — I10 ESSENTIAL HYPERTENSION: ICD-10-CM

## 2019-10-21 DIAGNOSIS — M54.2 NECK PAIN: ICD-10-CM

## 2019-10-21 DIAGNOSIS — M25.512 CHRONIC LEFT SHOULDER PAIN: Primary | ICD-10-CM

## 2019-10-21 DIAGNOSIS — M54.10 RADICULOPATHY AFFECTING UPPER EXTREMITY: ICD-10-CM

## 2019-10-21 DIAGNOSIS — Z79.4 TYPE 2 DIABETES MELLITUS WITHOUT COMPLICATION, WITH LONG-TERM CURRENT USE OF INSULIN (HCC): ICD-10-CM

## 2019-10-21 DIAGNOSIS — M54.12 CERVICAL RADICULOPATHY: ICD-10-CM

## 2019-10-21 DIAGNOSIS — E78.5 HYPERLIPIDEMIA, UNSPECIFIED HYPERLIPIDEMIA TYPE: ICD-10-CM

## 2019-10-21 DIAGNOSIS — G89.29 CHRONIC LEFT SHOULDER PAIN: Primary | ICD-10-CM

## 2019-10-21 LAB
ALBUMIN SERPL-MCNC: 5.1 G/DL (ref 3.5–5.2)
ALBUMIN/GLOB SERPL: 2.2 G/DL
ALP SERPL-CCNC: 83 U/L (ref 39–117)
ALT SERPL-CCNC: 21 U/L (ref 1–33)
AST SERPL-CCNC: 11 U/L (ref 1–32)
BILIRUB SERPL-MCNC: 0.3 MG/DL (ref 0.2–1.2)
BUN SERPL-MCNC: 17 MG/DL (ref 8–23)
BUN/CREAT SERPL: 22.4 (ref 7–25)
CALCIUM SERPL-MCNC: 10.1 MG/DL (ref 8.6–10.5)
CHLORIDE SERPL-SCNC: 94 MMOL/L (ref 98–107)
CHOLEST SERPL-MCNC: 211 MG/DL (ref 0–200)
CO2 SERPL-SCNC: 27.2 MMOL/L (ref 22–29)
CREAT SERPL-MCNC: 0.76 MG/DL (ref 0.57–1)
GLOBULIN SER CALC-MCNC: 2.3 GM/DL
GLUCOSE SERPL-MCNC: 130 MG/DL (ref 65–99)
HBA1C MFR BLD: 7.3 % (ref 4.8–5.6)
HDLC SERPL-MCNC: 46 MG/DL (ref 40–60)
INTERPRETATION: NORMAL
LDLC SERPL CALC-MCNC: 113 MG/DL (ref 0–100)
Lab: NORMAL
POTASSIUM SERPL-SCNC: 5.1 MMOL/L (ref 3.5–5.2)
PROT SERPL-MCNC: 7.4 G/DL (ref 6–8.5)
SODIUM SERPL-SCNC: 139 MMOL/L (ref 136–145)
TRIGL SERPL-MCNC: 262 MG/DL (ref 0–150)
VLDLC SERPL CALC-MCNC: 52.4 MG/DL

## 2019-10-21 PROCEDURE — 97161 PT EVAL LOW COMPLEX 20 MIN: CPT | Performed by: PHYSICAL THERAPIST

## 2019-10-21 PROCEDURE — 97012 MECHANICAL TRACTION THERAPY: CPT | Performed by: PHYSICAL THERAPIST

## 2019-10-21 NOTE — THERAPY EVALUATION
"    Outpatient Physical Therapy Ortho Initial Evaluation  [facility]     Patient Name: Rafiq Yost  : 1960  MRN: [id number]  Today's Date: 10/21/2019      Visit Date: 10/21/2019    Patient Active Problem List   Diagnosis   • Hyperlipidemia   • Essential hypertension   • Vitamin D deficiency   • Skull defect   • Arthralgia of left hip   • Type 2 diabetes mellitus without complication, with long-term current use of insulin (CMS/HCC)   • Colon cancer screening   • Osteopenia of multiple sites        Past Medical History:   Diagnosis Date   • Hyperlipidemia    • Hypertension    • Type 2 diabetes mellitus (CMS/HCC)         Past Surgical History:   Procedure Laterality Date   • HYSTERECTOMY         Visit Dx:     ICD-10-CM ICD-9-CM   1. Chronic left shoulder pain M25.512 719.41    G89.29 338.29   2. Neck pain M54.2 723.1   3. Radiculopathy affecting upper extremity M54.10 723.4   4. Cervical radiculopathy M54.12 723.4         Patient History     Row Name 10/21/19 1000             History    Chief Complaint  Pain  -GR      Type of Pain  Neck pain;Shoulder pain  -GR      Date Current Problem(s) Began  10/21/18  -GR      Brief Description of Current Complaint  C/o 1 year of L shoulder/neck pain that is gradually worsening. She does go for a monthly massage for the last 6 months which provides temporary relief. She c/o one episode of numbness in the hand but that improved since massage.  She is R handed.  She did have a steroid injection end of September/beginning of October which also provided temporary relief; she is also on prednisone dose pack with some relief. She does use heat at home and some self stretching and tylenol.  She has also trialed DDN with minimal benefit. Able to do her ADLs but with irritation, and will wake if rolling onto the L side at night.  Muscle feels very \"tight.\"  -GR      Previous treatment for THIS PROBLEM  Massage;Injections;Medication  -GR      Patient/Caregiver Goals  Relieve pain  " -GR      Occupation/sports/leisure activities  PT tech 4 days/week  -GR      Are you or can you be pregnant  No  -GR         Pain     Pain Location  Shoulder  -GR      Pain at Present  2  -GR      Pain at Best  2  -GR      Pain at Worst  10  -GR      Is your sleep disturbed?  Yes cannot lay on top of the shoulder  -GR         Fall Risk Assessment    Any falls in the past year:  No  -GR         Services    Do you plan to receive Home Health services in the near future  No  -GR         Daily Activities    Primary Language  English  -GR      How does patient learn best?  Listening;Reading  -GR      Pt Participated in POC and Goals  Yes  -GR         Safety    Are you being hurt, hit, or frightened by anyone at home or in your life?  No  -GR      Are you being neglected by a caregiver  No  -GR        User Key  (r) = Recorded By, (t) = Taken By, (c) = Cosigned By    Initials Name Provider Type    Rey Crawford, PT Physical Therapist          PT Ortho     Row Name 10/21/19 1000       Special Tests/Palpation    Special Tests/Palpation  Cervical/Thoracic  -GR       Cervical Palpation    Cervical Palpation- Location?  Suboccipital;Cervical facets;Upper traps;Middle traps;Rhomboids;Lower traps  -GR    Suboccipital  Left:;Guarded/taut;Swollen  -GR    Cervical Facets  Bilateral:;Guarded/taut  -GR    Upper Traps  Left:;Swollen;Tender;Guarded/taut  -GR    Middle Traps  Left:;Tender;Guarded/taut;Swollen  -GR    Rhomboids  Left:;Tender;Guarded/taut;Swollen  -GR    Lower Traps  Left:;Tender;Guarded/taut;Swollen  -GR       Cervical/Thoracic Special Tests    Spurlings (Foraminal Compression)  Left:;Positive  -GR       General ROM    Head/Neck/Trunk  Neck Lt Lateral Flexion;Neck Lt Rotation;Neck Rt Rotation;Neck Extension;Neck Flexion;Neck Rt Lateral Flexion  -GR    LT Upper Ext  Lt Shoulder ABduction;Lt Shoulder Flexion;Lt Shoulder External Rotation;Lt Shoulder Internal Rotation  -GR       Head/Neck/Trunk    Neck Extension AROM   40  -GR    Neck Flexion AROM  30  -GR    Neck Lt Lateral Flexion AROM  20  -GR    Neck Rt Lateral Flexion AROM  30  -GR    Neck Lt Rotation AROM  40 painfree /65 with pain  -GR    Neck Rt Rotation AROM  753  -GR       Left Upper Ext    Lt Shoulder Abduction AROM  175  -GR    Lt Shoulder Flexion AROM  175  -GR    Lt Shoulder External Rotation AROM  C7  -GR    Lt Shoulder Internal Rotation AROM  T10  -GR       MMT (Manual Muscle Testing)    Rt Upper Ext  Rt Shoulder Flexion;Rt Shoulder ABduction;Rt Shoulder Internal Rotation;Rt Shoulder External Rotation  -GR    Lt Upper Ext  Lt Shoulder Flexion;Lt Shoulder ABduction;Lt Shoulder Internal Rotation;Lt Shoulder External Rotation  -GR        Strength Right    # Reps  3  -GR    Right Rung  2  -GR    Right  Test 1  58  -GR    Right  Test 2  55  -GR    Right  Test 3  55  -GR     Strength Average Right  56  -GR        Strength Left    # Reps  3  -GR    Left Rung  2  -GR    Left  Test 1  45  -GR    Left  Test 2  40  -GR    Left  Test 3  41  -GR     Strength Average Left  42  -GR       Hand  Strength     Strength Affected Side  Right;Left  -GR      User Key  (r) = Recorded By, (t) = Taken By, (c) = Cosigned By    Initials Name Provider Type    GR Rey Hayward, PT Physical Therapist                      Therapy Education  Education Details: Role of outpatient PT, POC, differential diagnosis, initial HEP, expectations.  Given: HEP, Symptoms/condition management  Program: New  How Provided: Verbal, Demonstration, Written  Provided to: Patient  Level of Understanding: Teach back education performed, Verbalized     PT OP Goals     Row Name 10/21/19 1300          PT Short Term Goals    STG Date to Achieve  11/20/19  -GR     STG 1  Patient will be independent with initial HEP.  -GR     STG 1 Progress  New  -GR     STG 2  Patient will report no worse with cervical traction.  -GR     STG 2 Progress  New  -GR     STG 3  Patient will  report ability to independently centralize symptoms.  -GR     STG 3 Progress  New  -GR        Long Term Goals    LTG Date to Achieve  12/20/19  -GR     LTG 1  Patient will be independent with progressive HEP for long term management of current condition.  -GR     LTG 1 Progress  New  -GR     LTG 2  Patient will demonstrate >/=75 degrees of cervical rotation bilaterally to assist in conversational ease/community engagement.  -GR     LTG 2 Progress  New  -GR     LTG 3  Patient will deny pain with sleeping on L side.  -GR     LTG 3 Progress  New  -GR     LTG 4  0% on the quickDASH scored to indicate improved perceived ADL performance.  -GR     LTG 4 Progress  New  -GR        Time Calculation    PT Goal Re-Cert Due Date  01/19/20  -GR       User Key  (r) = Recorded By, (t) = Taken By, (c) = Cosigned By    Initials Name Provider Type    Rey Crawford, PT Physical Therapist          PT Assessment/Plan     Row Name 10/21/19 2280          PT Assessment    Functional Limitations  Performance in leisure activities;Performance in self-care ADL;Performance in work activities  -GR     Impairments  Pain;Posture;Range of motion;Poor body mechanics;Muscle strength;Joint mobility  -GR     Assessment Comments  59 y.o. female  R handed referred to outpatient physical therapy for evaluation and treatment of left shoulder/neck pain x 1 year insidious history, mild improvement with steroids.  Patient presents with decreased kyphosis, impaired cervical rotation L, positive spurling's L, tender persicapular musculature but good ROM GHJ. Signs and symptoms are consistent with cervical radiculopathy.  Pertinent comorbidities and personal factors that may affect progress include, but are not limited to, DM, HTN.  This condition is stable. Recommend skilled PT to address functional deficits. Thank you for this referral.  -GR     Please refer to paper survey for additional self-reported information  Yes  -GR     Rehab Potential  Good   -GR     Patient/caregiver participated in establishment of treatment plan and goals  Yes  -GR     Patient would benefit from skilled therapy intervention  Yes  -GR        PT Plan    PT Frequency  1x/week  -GR     Predicted Duration of Therapy Intervention (Therapy Eval)  6 visits  -GR     Planned CPT's?  PT EVAL LOW COMPLEXITY: 19154;PT RE-EVAL: 37935;PT THER PROC EA 15 MIN: 78342;PT THER ACT EA 15 MIN: 21403;PT MANUAL THERAPY EA 15 MIN: 63579;PT NEUROMUSC RE-EDUCATION EA 15 MIN: 80507;PT SELF CARE/HOME MGMT/TRAIN EA 15: 82233;PT HOT OR COLD PACK TREAT MCARE;PT ELECTRICAL STIM UNATTEND: ;PT ELECTRICAL STIM ATTD EA 15 MIN: 81403;PT ULTRASOUND EA 15 MIN: 36970;PT TRACTION CERVICAL: 23970  -GR     Physical Therapy Interventions (Optional Details)  home exercise program;joint mobilization;manual therapy techniques;modalities;neuromuscular re-education;patient/family education;postural re-education;ROM (Range of Motion);strengthening;stretching  -GR     PT Plan Comments  Assess response to traction, continue if beneficial. Add scapular strengthening program.  -GR       User Key  (r) = Recorded By, (t) = Taken By, (c) = Cosigned By    Initials Name Provider Type    Rey Crawford, PT Physical Therapist          Modalities     Row Name 10/21/19 1300             Traction 16750    Traction Type  Cervical  -GR      Rx Minutes  10  -GR      Duration  Intermittent  -GR      Position  Other 90/90  -GR      Weight  15 /5  -GR      Hold  40  -GR      Relax  10  -GR        User Key  (r) = Recorded By, (t) = Taken By, (c) = Cosigned By    Initials Name Provider Type    Rey Crawford, PT Physical Therapist        OP Exercises     Row Name 10/21/19 1300             Total Minutes    94135 - PT Therapeutic Exercise Minutes  3  -GR         Exercise 1    Exercise Name 1  Review of supine retraction, reverse shoulder rolls, SNAG, door stretch low, UT stretch and levator stretch seated  -GR        User Key  (r) = Recorded  By, (t) = Taken By, (c) = Cosigned By    Initials Name Provider Type    GR Rey Hayward, PT Physical Therapist                        Outcome Measure Options: Quick DASH  Quick DASH  Open a tight or new jar.: No Difficulty  Do heavy household chores (e.g., wash walls, wash floors): No Difficulty  Carry a shopping bag or briefcase: No Difficulty  Wash your back: No Difficulty  Use a knife to cut food: No Difficulty  Recreational activities in which you take some force or impact through your arm, should or hand (e.g. golf, hammering, tennis, etc.): No Difficulty  During the past week, to what extent has your arm, shoulder, or hand problem interfered with your normal social activites with family, friends, neighbors or groups?: Slightly  During the past week, were you limited in your work or other regular daily activities as a result of your arm, shoulder or hand problem?: Not limited at all  Arm, Shoulder, or hand pain: Mild  Tingling (pins and needles) in your arm, shoulder, or hand: None  During the past week, how much difficulty have you had sleeping because of the pain in your arm, shoulder or hand?: No difficulty  Number of Questions Answered: 11  Quick DASH Score: 4.55         Time Calculation:     Start Time: 1002  Stop Time: 1050  Time Calculation (min): 48 min  Total Timed Code Minutes- PT: 3 minute(s)     Therapy Charges for Today     Code Description Service Date Service Provider Modifiers Qty    43271833404 HC PT-TRACTION MECHANICAL 10/21/2019 Rey Hayward, PT  1    90494578273 HC PT EVAL LOW COMPLEXITY 2 10/21/2019 Rey Hayward, PT GP 1          PT G-Codes  Outcome Measure Options: Quick DASH  Quick DASH Score: 4.55         Rey Hayward, DEENA  10/21/2019

## 2019-10-23 DIAGNOSIS — Z79.4 TYPE 2 DIABETES MELLITUS WITHOUT COMPLICATION, WITH LONG-TERM CURRENT USE OF INSULIN (HCC): ICD-10-CM

## 2019-10-23 DIAGNOSIS — E11.9 TYPE 2 DIABETES MELLITUS WITHOUT COMPLICATION, WITH LONG-TERM CURRENT USE OF INSULIN (HCC): ICD-10-CM

## 2019-10-23 RX ORDER — GLIMEPIRIDE 2 MG/1
TABLET ORAL
Qty: 180 TABLET | Refills: 1 | Status: SHIPPED | OUTPATIENT
Start: 2019-10-23 | End: 2019-10-28 | Stop reason: SDUPTHER

## 2019-10-25 NOTE — PROGRESS NOTES
Ewelina Yost is a 59 y.o. female.     F/u for dm 2, hypertension, hyperlipidemia/ testing bs 2 x day / last dm eye exam 2019/ last dm foot exam 19 with dr Douglas / flu vaccine @[facility], [place], operated by [facility]        Patient is a 59-year-old female who came in for follow-up. She has known diabetes mellitus since . He has been on glimepiride 2 mg 2 tabs q AM and 1 tab q PM and metformin  mg 2 tablets twice a day, Farxiga 10 mg/day and Trulicity 1.5 mg weekly.  She has been having recurrent vaginal yeast infection.  Fasting blood sugar runs between .  Random blood sugar runs between 126-145. She denies any severe hypoglycemia.  She has no significant weight change since 2018.  Hemoglobin A1c done in 2019 is 7.3%.      Last eye examination was in  with Dr. Brown. There was no retinopathy. She has early cataracts. She has no microalbuminuria on urine sample taken last .  She is on a irbesartan.  She denies any paresthesias.      She has hyperlipidemia and has been on Lipitor 20 mg once a day. She denies any myalgia.        She is  2 para 1. She had a previous hysterectomy. She is not on hormone replacement therapy. She had a bone density done in on 2017 which showed osteopenia of the lumbar spine and right hip.  Her 10 year probability of major osteoporotic fracture is low at 6.6% and of hip fracture at 0.3%. . She has vitamin D deficiency and is on vitamin D 2000 units per day.  Her mother has history of osteoporosis.  She has no parental history of hip fracture.  She denies alcohol or tobacco use.      Last bone density was in 2019 which showed osteopenia which has worsened since 2017.  Her 10-year probability of major osteoporotic fracture and of hip fracture are low at 7.4% and 0.5% respectively.     She has hypertension and is on irbesartan 150 mg once a day.  She has no previous history of heart attack or stroke.  She denies chest pain or shortness of breath.     She  "has not had a colonoscopy.  She had a normal Cologuard in July 2017.  She denies bowel complaints.    She has neck discomfort over the past year which has worsened since 4 months ago.  She has seen Dr. Asiya Montaño and was referred to Dr. Hess.  She has not had an MRI done.  She denies muscle weakness.  She was given oral steroids were just made her blood sugar go higher.    The following portions of the patient's history were reviewed and updated as appropriate: allergies, current medications, past family history, past medical history, past social history, past surgical history and problem list.    Review of Systems   Constitutional: Negative.    HENT: Negative.    Eyes: Negative.    Respiratory: Negative.    Cardiovascular: Negative.  Negative for chest pain and palpitations.   Gastrointestinal: Negative.    Endocrine: Negative.    Genitourinary: Negative.    Musculoskeletal: Positive for neck pain and neck stiffness. Negative for arthralgias and joint swelling.   Neurological: Negative for dizziness and numbness.     ROS reviewed again  Objective      Vitals:    10/28/19 1440   BP: 146/80   BP Location: Right arm   Patient Position: Sitting   Cuff Size: Small Adult   Pulse: 96   SpO2: 98%   Weight: 65.7 kg (144 lb 12.8 oz)   Height: 162.6 cm (64.02\")     Physical Exam   Constitutional: She is oriented to person, place, and time. She appears well-developed and well-nourished. No distress.   HENT:   Head: Normocephalic.   Right Ear: External ear normal.   Left Ear: External ear normal.   Nose: Nose normal.   Mouth/Throat: Oropharynx is clear and moist. No oropharyngeal exudate.   Eyes: Conjunctivae and EOM are normal. Right eye exhibits no discharge. Left eye exhibits no discharge. No scleral icterus.   Neck: Neck supple. No JVD present. No tracheal deviation present. No thyromegaly present.   Cardiovascular: Normal rate, regular rhythm, normal heart sounds and intact distal pulses. Exam reveals no gallop and " no friction rub.   No murmur heard.  Pulmonary/Chest: Effort normal and breath sounds normal. She has no wheezes. She has no rales.   Abdominal: Soft. Bowel sounds are normal. She exhibits no distension and no mass. There is no tenderness. There is no guarding.   Musculoskeletal: Normal range of motion. She exhibits no edema, tenderness or deformity.   Lymphadenopathy:     She has no cervical adenopathy.   Neurological: She is alert and oriented to person, place, and time. She displays normal reflexes. She exhibits normal muscle tone. Coordination normal.   Skin: Skin is warm and dry. No rash noted. No erythema. No pallor.   Psychiatric: She has a normal mood and affect. Her behavior is normal.     Results Encounter on 10/21/2019   Component Date Value Ref Range Status   • Hemoglobin A1C 10/21/2019 7.30* 4.80 - 5.60 % Final    Comment: Hemoglobin A1C Ranges:  Increased Risk for Diabetes  5.7% to 6.4%  Diabetes                     >= 6.5%  Diabetic Goal                < 7.0%     • Glucose 10/21/2019 130* 65 - 99 mg/dL Final   • BUN 10/21/2019 17  8 - 23 mg/dL Final   • Creatinine 10/21/2019 0.76  0.57 - 1.00 mg/dL Final   • eGFR Non African Am 10/21/2019 78  >60 mL/min/1.73 Final   • eGFR African Am 10/21/2019 94  >60 mL/min/1.73 Final   • BUN/Creatinine Ratio 10/21/2019 22.4  7.0 - 25.0 Final   • Sodium 10/21/2019 139  136 - 145 mmol/L Final   • Potassium 10/21/2019 5.1  3.5 - 5.2 mmol/L Final   • Chloride 10/21/2019 94* 98 - 107 mmol/L Final   • Total CO2 10/21/2019 27.2  22.0 - 29.0 mmol/L Final   • Calcium 10/21/2019 10.1  8.6 - 10.5 mg/dL Final   • Total Protein 10/21/2019 7.4  6.0 - 8.5 g/dL Final   • Albumin 10/21/2019 5.10  3.50 - 5.20 g/dL Final   • Globulin 10/21/2019 2.3  gm/dL Final   • A/G Ratio 10/21/2019 2.2  g/dL Final   • Total Bilirubin 10/21/2019 0.3  0.2 - 1.2 mg/dL Final   • Alkaline Phosphatase 10/21/2019 83  39 - 117 U/L Final   • AST (SGOT) 10/21/2019 11  1 - 32 U/L Final   • ALT (SGPT)  10/21/2019 21  1 - 33 U/L Final   • Total Cholesterol 10/21/2019 211* 0 - 200 mg/dL Final   • Triglycerides 10/21/2019 262* 0 - 150 mg/dL Final   • HDL Cholesterol 10/21/2019 46  40 - 60 mg/dL Final   • VLDL Cholesterol 10/21/2019 52.4  mg/dL Final   • LDL Cholesterol  10/21/2019 113* 0 - 100 mg/dL Final   • Interpretation 10/21/2019 Note   Final    Supplemental report is available.   • PDF Image 10/21/2019 Not applicable   Final     Assessment/Plan   Rafiq was seen today for diabetes, hyperlipidemia and hypertension.    Diagnoses and all orders for this visit:    Type 2 diabetes mellitus without complication, with long-term current use of insulin (CMS/[facility])    Hyperlipidemia, unspecified hyperlipidemia type  -     atorvastatin (LIPITOR) 40 MG tablet; Take 1 tablet by mouth Daily.    Essential hypertension    Vitamin D deficiency    Osteopenia of multiple sites      Discontinue Farxiga because of recurrent vaginal yeast infection  Continue glimepiride 4 mg every morning and 2 mg every evening.  May increase glimepiride dose to 4 mg twice a day for blood sugar runs higher off Farxiga.  Continue metformin  mg 2 tablets twice a day.  Increase Lipitor to 40 mg/day.  Continue vitamin D 2000 units/day.  Continue irbesartan 150 mg/day.  Advised to get pneumovax.    Copy of my note sent to Nicki Rogers NP.    RTC 4 mos.

## 2019-10-28 ENCOUNTER — OFFICE VISIT (OUTPATIENT)
Dept: ENDOCRINOLOGY | Age: 59
End: 2019-10-28

## 2019-10-28 VITALS
HEIGHT: 64 IN | DIASTOLIC BLOOD PRESSURE: 80 MMHG | SYSTOLIC BLOOD PRESSURE: 146 MMHG | WEIGHT: 144.8 LBS | OXYGEN SATURATION: 98 % | HEART RATE: 96 BPM | BODY MASS INDEX: 24.72 KG/M2

## 2019-10-28 DIAGNOSIS — E78.5 HYPERLIPIDEMIA, UNSPECIFIED HYPERLIPIDEMIA TYPE: ICD-10-CM

## 2019-10-28 DIAGNOSIS — Z79.4 TYPE 2 DIABETES MELLITUS WITHOUT COMPLICATION, WITH LONG-TERM CURRENT USE OF INSULIN (HCC): Primary | ICD-10-CM

## 2019-10-28 DIAGNOSIS — E11.9 TYPE 2 DIABETES MELLITUS WITHOUT COMPLICATION, WITH LONG-TERM CURRENT USE OF INSULIN (HCC): Primary | ICD-10-CM

## 2019-10-28 DIAGNOSIS — I10 ESSENTIAL HYPERTENSION: ICD-10-CM

## 2019-10-28 DIAGNOSIS — E55.9 VITAMIN D DEFICIENCY: ICD-10-CM

## 2019-10-28 DIAGNOSIS — M85.89 OSTEOPENIA OF MULTIPLE SITES: ICD-10-CM

## 2019-10-28 PROCEDURE — 99214 OFFICE O/P EST MOD 30 MIN: CPT | Performed by: INTERNAL MEDICINE

## 2019-10-28 RX ORDER — ATORVASTATIN CALCIUM 40 MG/1
40 TABLET, FILM COATED ORAL DAILY
Qty: 30 TABLET | Refills: 6 | Status: SHIPPED | OUTPATIENT
Start: 2019-10-28 | End: 2020-05-18

## 2019-11-04 ENCOUNTER — HOSPITAL ENCOUNTER (OUTPATIENT)
Dept: PHYSICAL THERAPY | Facility: HOSPITAL | Age: 59
Setting detail: THERAPIES SERIES
Discharge: HOME | End: 2019-11-04

## 2019-11-04 DIAGNOSIS — M25.512 CHRONIC LEFT SHOULDER PAIN: Primary | ICD-10-CM

## 2019-11-04 DIAGNOSIS — M54.2 NECK PAIN: ICD-10-CM

## 2019-11-04 DIAGNOSIS — G89.29 CHRONIC LEFT SHOULDER PAIN: Primary | ICD-10-CM

## 2019-11-04 DIAGNOSIS — M54.10 RADICULOPATHY AFFECTING UPPER EXTREMITY: ICD-10-CM

## 2019-11-04 DIAGNOSIS — M54.12 CERVICAL RADICULOPATHY: ICD-10-CM

## 2019-11-04 PROCEDURE — 97012 MECHANICAL TRACTION THERAPY: CPT | Performed by: PHYSICAL THERAPIST

## 2019-11-04 PROCEDURE — 97110 THERAPEUTIC EXERCISES: CPT | Performed by: PHYSICAL THERAPIST

## 2019-11-04 NOTE — THERAPY TREATMENT NOTE
Outpatient Physical Therapy Ortho Treatment Note  [facility]     Patient Name: Rafiq Yost  : 1960  MRN: [id number]  Today's Date: 2019      Visit Date: 2019    Visit Dx:    ICD-10-CM ICD-9-CM   1. Chronic left shoulder pain M25.512 719.41    G89.29 338.29   2. Neck pain M54.2 723.1   3. Radiculopathy affecting upper extremity M54.10 723.4   4. Cervical radiculopathy M54.12 723.4       Patient Active Problem List   Diagnosis   • Hyperlipidemia   • Essential hypertension   • Vitamin D deficiency   • Skull defect   • Arthralgia of left hip   • Type 2 diabetes mellitus without complication, with long-term current use of insulin (CMS/HCC)   • Colon cancer screening   • Osteopenia of multiple sites        Past Medical History:   Diagnosis Date   • Hyperlipidemia    • Hypertension    • Type 2 diabetes mellitus (CMS/HCC)         Past Surgical History:   Procedure Laterality Date   • HYSTERECTOMY                         PT Assessment/Plan     Row Name 19 1040          PT Assessment    Assessment Comments  Patient returns for 1st follow up visit with no signs of regression. Able to advance traction parameters, add scapular resistance training and additional pec fiber stretching. Patient to update HEP and return in another 2 weeks for reassessment.  -GR        PT Plan    PT Plan Comments  Reassess in 2 weeks.  -GR       User Key  (r) = Recorded By, (t) = Taken By, (c) = Cosigned By    Initials Name Provider Type    GR Rey Hayward, PT Physical Therapist          Modalities     Row Name 19 0900             Moist Heat    MH Applied  Yes  -GR      Location  upper back during traction  -GR      Rx Minutes  15 mins  -GR         Traction 15787    Traction Type  Cervical  -GR      Rx Minutes  15  -GR      Duration  Intermittent  -GR      Position  Other 90/90  -GR      Weight  17 /8  -GR      Hold  40  -GR      Relax  10  -GR        User Key  (r) = Recorded By, (t) = Taken By, (c) = Cosigned  By    Initials Name Provider Type    GR Rey Hayward, PT Physical Therapist        OP Exercises     Row Name 11/04/19 0900             Subjective Comments    Subjective Comments  Reports no regression since IE and did find traction helpful. She denies paresthesias on this date.  -GR         Subjective Pain    Able to rate subjective pain?  yes  -GR      Pre-Treatment Pain Level  0  -GR         Total Minutes    05355 - PT Therapeutic Exercise Minutes  20  -GR         Exercise 1    Exercise Name 1  Supine c/spine retraction  -GR      Cueing 1  Demo  -GR      Sets 1  1  -GR      Reps 1  10  -GR      Time 1  5 sec  -GR         Exercise 2    Exercise Name 2  Reverse shoulder rolls  -GR      Cueing 2  Demo  -GR      Sets 2  2  -GR      Reps 2  10  -GR         Exercise 3    Exercise Name 3  Doorway stretch  -GR      Cueing 3  Demo  -GR      Sets 3  1  -GR      Reps 3  3  -GR      Time 3  20 seconds  -GR      Additional Comments  arms low, mid and high  -GR         Exercise 4    Exercise Name 4  Seated cervical SNAG for rotation  -GR      Cueing 4  Demo  -GR      Sets 4  1  -GR      Reps 4  10  -GR      Time 4  10 sec  -GR      Additional Comments  L elbow supported  -GR         Exercise 5    Exercise Name 5  UT stretch with gentle OP  -GR      Cueing 5  Demo  -GR      Sets 5  1  -GR      Reps 5  3  -GR      Time 5  20 seconds  -GR         Exercise 6    Exercise Name 6  Rows  -GR      Cueing 6  Demo  -GR      Sets 6  1  -GR      Reps 6  20  -GR      Additional Comments  RTB  -GR        User Key  (r) = Recorded By, (t) = Taken By, (c) = Cosigned By    Initials Name Provider Type    GR Rey Hayward, PT Physical Therapist                       PT OP Goals     Row Name 11/04/19 0900          PT Short Term Goals    STG Date to Achieve  11/20/19  -GR     STG 1  Patient will be independent with initial HEP.  -GR     STG 1 Progress  Ongoing  -GR     STG 1 Progress Comments  required correction of UT stretch  -GR     STG 2   Patient will report no worse with cervical traction.  -GR     STG 2 Progress  Met  -GR     STG 3  Patient will report ability to independently centralize symptoms.  -GR     STG 3 Progress  Met  -GR        Long Term Goals    LTG Date to Achieve  12/20/19  -GR     LTG 1  Patient will be independent with progressive HEP for long term management of current condition.  -GR     LTG 1 Progress  Ongoing  -GR     LTG 2  Patient will demonstrate >/=75 degrees of cervical rotation bilaterally to assist in conversational ease/community engagement.  -GR     LTG 2 Progress  Ongoing  -GR     LTG 3  Patient will deny pain with sleeping on L side.  -GR     LTG 3 Progress  Ongoing  -GR     LTG 4  0% on the quickDASH scored to indicate improved perceived ADL performance.  -GR     LTG 4 Progress  Ongoing  -GR       User Key  (r) = Recorded By, (t) = Taken By, (c) = Cosigned By    Initials Name Provider Type     Rey Hayward, PT Physical Therapist                         Time Calculation:   Start Time: 0915  Stop Time: 0950  Time Calculation (min): 35 min  Total Timed Code Minutes- PT: 20 minute(s)  Therapy Charges for Today     Code Description Service Date Service Provider Modifiers Qty    00149788770  PT THER PROC EA 15 MIN 11/4/2019 Rey Hayward, PT GP 1    60784704738 HC PT-TRACTION MECHANICAL 11/4/2019 Rey Hayward, PT  1    [id number]  PT HOT OR COLD PACK TREAT MCARE 11/4/2019 Rey Hayward, PT GP 1                    Rey Hayward PT  11/4/2019

## 2019-11-18 ENCOUNTER — HOSPITAL ENCOUNTER (OUTPATIENT)
Dept: PHYSICAL THERAPY | Facility: HOSPITAL | Age: 59
Setting detail: THERAPIES SERIES
Discharge: HOME | End: 2019-11-18

## 2019-11-18 DIAGNOSIS — M54.10 RADICULOPATHY AFFECTING UPPER EXTREMITY: ICD-10-CM

## 2019-11-18 DIAGNOSIS — M25.512 CHRONIC LEFT SHOULDER PAIN: Primary | ICD-10-CM

## 2019-11-18 DIAGNOSIS — G89.29 CHRONIC LEFT SHOULDER PAIN: Primary | ICD-10-CM

## 2019-11-18 DIAGNOSIS — M54.12 CERVICAL RADICULOPATHY: ICD-10-CM

## 2019-11-18 DIAGNOSIS — M54.2 NECK PAIN: ICD-10-CM

## 2019-11-18 PROCEDURE — 97110 THERAPEUTIC EXERCISES: CPT | Performed by: PHYSICAL THERAPIST

## 2019-11-18 PROCEDURE — 97012 MECHANICAL TRACTION THERAPY: CPT | Performed by: PHYSICAL THERAPIST

## 2019-11-18 NOTE — THERAPY DISCHARGE NOTE
Outpatient Physical Therapy Ortho Treatment Note/Discharge Summary  [facility]     Patient Name: Rafiq Yost  : 1960  MRN: [id number]  Today's Date: 2019      Visit Date: 2019    Visit Dx:    ICD-10-CM ICD-9-CM   1. Chronic left shoulder pain M25.512 719.41    G89.29 338.29   2. Neck pain M54.2 723.1   3. Radiculopathy affecting upper extremity M54.10 723.4   4. Cervical radiculopathy M54.12 723.4       Patient Active Problem List   Diagnosis   • Hyperlipidemia   • Essential hypertension   • Vitamin D deficiency   • Skull defect   • Arthralgia of left hip   • Type 2 diabetes mellitus without complication, with long-term current use of insulin (CMS/[facility])   • Colon cancer screening   • Osteopenia of multiple sites        Past Medical History:   Diagnosis Date   • Hyperlipidemia    • Hypertension    • Type 2 diabetes mellitus (CMS/HCC)         Past Surgical History:   Procedure Laterality Date   • HYSTERECTOMY         PT Ortho     Row Name 19 1000       Head/Neck/Trunk    Neck Lt Rotation AROM  20 painfree/75  -GR    Neck Rt Rotation AROM  87  -GR        Strength Right    # Reps  3  -GR    Right Rung  2  -GR    Right  Test 1  62  -GR    Right  Test 2  60  -GR    Right  Test 3  55  -GR     Strength Average Right  59  -GR        Strength Left    # Reps  3  -GR    Left Rung  2  -GR    Left  Test 1  49  -GR    Left  Test 2  45  -GR    Left  Test 3  44  -GR     Strength Average Left  46  -GR      User Key  (r) = Recorded By, (t) = Taken By, (c) = Cosigned By    Initials Name Provider Type    Rey Crawford, PT Physical Therapist                      PT Assessment/Plan     Row Name 19 0275          PT Assessment    Assessment Comments  Patient attended 3 sessions of skilled PT including evaluatoin for cervical radiculopathy. She demonstrates objective improvement in cervical rom and  strength. Pain is unchanged/unimproved.  Per  independence with HEP will d/c and return back to MD for other interventions. Thank you for this referral.  -GR        PT Plan    PT Plan Comments  DC to I HEP  -GR       User Key  (r) = Recorded By, (t) = Taken By, (c) = Cosigned By    Initials Name Provider Type    Rey Crawford, PT Physical Therapist          Modalities     Row Name 11/18/19 1100 11/18/19 0900          Moist Heat    MH Applied  Yes  -GR  --     Location  upper back during traction  -GR  --     Rx Minutes  15 mins  -GR  --        Traction 06459    Traction Type  --  Cervical  -GR     Rx Minutes  --  15  -GR     Duration  --  Intermittent  -GR     Position  --  Other 90/90  -GR     Weight  --  20 /8  -GR     Hold  --  45  -GR     Relax  --  15  -GR       User Key  (r) = Recorded By, (t) = Taken By, (c) = Cosigned By    Initials Name Provider Type    Rey Crawford, PT Physical Therapist          OP Exercises     Row Name 11/18/19 1100             Subjective Comments    Subjective Comments  Minimal change thus far, neck remains sore on L side and shoulder tendor anterior to posterior joint line.  Doing HEP regularly.  -GR         Subjective Pain    Able to rate subjective pain?  yes  -GR      Pre-Treatment Pain Level  1  -GR         Total Minutes    53120 - PT Therapeutic Exercise Minutes  10  -GR         Exercise 1    Exercise Name 1  HEP review and status review  -GR        User Key  (r) = Recorded By, (t) = Taken By, (c) = Cosigned By    Initials Name Provider Type    Rey Crawford, PT Physical Therapist                         PT OP Goals     Row Name 11/18/19 1000          PT Short Term Goals    STG Date to Achieve  11/20/19  -GR     STG 1  Patient will be independent with initial HEP.  -GR     STG 1 Progress  Met  -GR     STG 2  Patient will report no worse with cervical traction.  -GR     STG 2 Progress  Met  -GR     STG 3  Patient will report ability to independently centralize symptoms.  -GR     STG 3 Progress  Met  -GR         Long Term Goals    LTG Date to Achieve  12/20/19  -GR     LTG 1  Patient will be independent with progressive HEP for long term management of current condition.  -GR     LTG 1 Progress  Met  -GR     LTG 2  Patient will demonstrate >/=75 degrees of cervical rotation bilaterally to assist in conversational ease/community engagement.  -GR     LTG 2 Progress  Not Met  -GR     LTG 3  Patient will deny pain with sleeping on L side.  -GR     LTG 3 Progress  Not Met  -GR     LTG 3 Progress Comments  unchanged  -GR     LTG 4  0% on the quickDASH scored to indicate improved perceived ADL performance.  -GR     LTG 4 Progress  Goal Revised  -GR     LTG 4 Progress Comments  did not reissue  -GR       User Key  (r) = Recorded By, (t) = Taken By, (c) = Cosigned By    Initials Name Provider Type    Rey Crawford, PT Physical Therapist          Therapy Education  Education Details: finalized HEP and plan for MD follow up              Time Calculation:   Start Time: 1020  Stop Time: 1045  Time Calculation (min): 25 min  Total Timed Code Minutes- PT: 10 minute(s)  Therapy Charges for Today     Code Description Service Date Service Provider Modifiers Qty    07512861965  PT THER PROC EA 15 MIN 11/18/2019 Rey Hayward, PT GP 1    27937257466 HC PT-TRACTION MECHANICAL 11/18/2019 Rey Hayward, PT  1    [id number]  PT HOT OR COLD PACK TREAT MCARE 11/18/2019 Rey Hayward, PT GP 1                OP PT Discharge Summary  Date of Discharge: 11/18/19  Reason for Discharge: Maximum functional potential achieved  Outcomes Achieved: Patient able to partially acheive established goals  Discharge Destination: Home with home program  Discharge Instructions/Additional Comments: dc to I HEP.      Rey Hayward, PT  11/18/2019

## 2019-12-05 RX ORDER — NABUMETONE 500 MG/1
500 TABLET, FILM COATED ORAL 2 TIMES DAILY PRN
Qty: 60 TABLET | Refills: 1 | Status: SHIPPED | OUTPATIENT
Start: 2019-12-05 | End: 2019-12-18

## 2019-12-18 ENCOUNTER — OFFICE VISIT (OUTPATIENT)
Dept: NEUROSURGERY | Facility: CLINIC | Age: 59
End: 2019-12-18

## 2019-12-18 VITALS
SYSTOLIC BLOOD PRESSURE: 159 MMHG | BODY MASS INDEX: 24.41 KG/M2 | HEIGHT: 64 IN | HEART RATE: 115 BPM | DIASTOLIC BLOOD PRESSURE: 98 MMHG | WEIGHT: 143 LBS

## 2019-12-18 DIAGNOSIS — M54.12 CERVICAL RADICULOPATHY: ICD-10-CM

## 2019-12-18 DIAGNOSIS — M50.30 DEGENERATION OF CERVICAL INTERVERTEBRAL DISC: Primary | ICD-10-CM

## 2019-12-18 PROCEDURE — 99243 OFF/OP CNSLTJ NEW/EST LOW 30: CPT | Performed by: PHYSICIAN ASSISTANT

## 2019-12-18 RX ORDER — NABUMETONE 500 MG/1
500 TABLET, FILM COATED ORAL 2 TIMES DAILY PRN
Qty: 60 TABLET | Refills: 3 | Status: SHIPPED | OUTPATIENT
Start: 2019-12-18 | End: 2020-06-09

## 2019-12-18 RX ORDER — METAXALONE 800 MG/1
800 TABLET ORAL EVERY 6 HOURS PRN
Qty: 120 TABLET | Refills: 3 | Status: SHIPPED | OUTPATIENT
Start: 2019-12-18 | End: 2020-11-23

## 2019-12-18 NOTE — PROGRESS NOTES
Subjective   Patient ID: Rafiq Yost is a 59 y.o. female is being seen for consultation today at the request of Rosemary Motnaño MD  For neck and left arm pain.  She denies any cause or injury.  She tried PT dry with dry needling with no relief.  She is still doing HEP.  Mrs. Yost takes Nabumetone 500 mg BID for pain.     Neck Pain    This is a recurrent problem. Episode onset: 2 years worse past 6-8 months  The problem has been gradually worsening. The pain is associated with nothing. The quality of the pain is described as aching. The pain is at a severity of 5/10. The pain is worse during the day. Associated symptoms include tingling. Pertinent negatives include no headaches, numbness, visual change or weakness. Treatments tried: PT, dry needling, massages  The treatment provided no relief.   Arm Pain    The pain is present in the left shoulder and upper left arm. The quality of the pain is described as aching. The pain radiates to the left arm. The pain is at a severity of 3/10. The pain is mild. The pain has been intermittent since the incident. Associated symptoms include tingling. Pertinent negatives include no numbness. She has tried heat (PT, Dry needling) for the symptoms. The treatment provided mild relief.       The following portions of the patient's history were reviewed and updated as appropriate: allergies, current medications, past family history, past medical history, past social history, past surgical history and problem list.    Review of Systems   Genitourinary: Negative for difficulty urinating.   Musculoskeletal: Positive for neck pain (L upper arm ).   Neurological: Positive for tingling. Negative for weakness, numbness and headaches.   All other systems reviewed and are negative.      Objective   Physical Exam   Constitutional: She is oriented to person, place, and time. She appears well-developed and well-nourished.   HENT:   Head: Normocephalic and atraumatic.   Right Ear: External ear  normal.   Left Ear: External ear normal.   Eyes: Pupils are equal, round, and reactive to light. Conjunctivae and EOM are normal. Right eye exhibits no discharge. Left eye exhibits no discharge.   Neck: Normal range of motion. Neck supple. No tracheal deviation present.   Pulmonary/Chest: Effort normal. No stridor. No respiratory distress.   Musculoskeletal: Normal range of motion. She exhibits no edema, tenderness or deformity.   Neurological: She is alert and oriented to person, place, and time. She has normal strength and normal reflexes. She displays no atrophy, no tremor and normal reflexes. No cranial nerve deficit or sensory deficit. She exhibits normal muscle tone. She displays a negative Romberg sign. She displays no seizure activity. Coordination and gait normal.   No long tract signs   Skin: Skin is warm and dry.   Psychiatric: She has a normal mood and affect. Her behavior is normal. Judgment and thought content normal.   Nursing note and vitals reviewed.    Neurologic Exam     Mental Status   Oriented to person, place, and time.     Cranial Nerves     CN III, IV, VI   Pupils are equal, round, and reactive to light.  Extraocular motions are normal.     Motor Exam     Strength   Strength 5/5 throughout.       Assessment/Plan   Independent Review of Radiographic Studies:      Medical Decision Making:    Ms. Yost was referred to us by Dr. Montaño for a one year hx of neck and L arm pain that began without accident or injury. She thought her pain was from the shoulder but after seeing her Dr. Montaño felt that her symptoms were radicular in nature and referred her here.  She has been going to PT which has not helped. She has not had any imaging of the C spine.     The pain is described as a soreness/burning along the left trapezius, left scapula and left upper arm.  She has had episodes of numbness and tingling in the left arm but that has actually resolved since getting monthly massages and attending therapy.   Unfortunately the nerve pain itself has not improved.  She denies any focal weakness or incontinence.  There are no particular exacerbating or alleviating factors.  Her exam does not reveal any focal weakness or long tract signs.    We discussed the nature of her symptoms.  I do suspect that they are radicular in nature and have recommended a cervical MRI.  I will call her with those results.  We tentatively discussed proceeding with epidurals assuming that there are no unexpected mechanical abnormalities on her MRI.  We discussed the risk of bleeding, infection and headache.  She is concerned about sedation for the injection as she gets very anxious prior to medical procedures.  I will refer her to Dr. Freeman for the epidurals.  Again I will call her with the MRI results prior to that and we can always change plans if the imaging results warrant it.  In the interim she will continue with Skelaxin and Relafen which she is currently using and tolerating well for pain control.  I have refilled both of those medications.  I will plan on seeing her back  2 months after she has completed several injections but she will call sooner with questions or concerns.`    Rafiq was seen today for neck pain and arm pain.    Diagnoses and all orders for this visit:    Degeneration of cervical intervertebral disc  -     MRI Cervical Spine Without Contrast; Future  -     Ambulatory Referral to Pain Management    Cervical radiculopathy  -     MRI Cervical Spine Without Contrast; Future  -     Ambulatory Referral to Pain Management    Other orders  -     metaxalone (SKELAXIN) 800 MG tablet; Take 1 tablet by mouth Every 6 (Six) Hours As Needed for Muscle Spasms.  -     nabumetone (RELAFEN) 500 MG tablet; Take 1 tablet by mouth 2 (Two) Times a Day As Needed for Mild Pain .      Return in about 2 months (around 2/18/2020) for will call pt with MRI results .

## 2019-12-23 DIAGNOSIS — Z79.4 TYPE 2 DIABETES MELLITUS WITHOUT COMPLICATION, WITH LONG-TERM CURRENT USE OF INSULIN (HCC): ICD-10-CM

## 2019-12-23 DIAGNOSIS — E11.9 TYPE 2 DIABETES MELLITUS WITHOUT COMPLICATION, WITH LONG-TERM CURRENT USE OF INSULIN (HCC): ICD-10-CM

## 2019-12-23 RX ORDER — GLIMEPIRIDE 4 MG/1
4 TABLET ORAL 2 TIMES DAILY
Qty: 180 TABLET | Refills: 1 | Status: SHIPPED | OUTPATIENT
Start: 2019-12-23 | End: 2020-03-09 | Stop reason: DRUGHIGH

## 2019-12-23 RX ORDER — DULAGLUTIDE 1.5 MG/.5ML
INJECTION, SOLUTION SUBCUTANEOUS
Qty: 2 ML | Refills: 1 | Status: SHIPPED | OUTPATIENT
Start: 2019-12-23 | End: 2020-02-20

## 2020-01-07 DIAGNOSIS — Z79.4 TYPE 2 DIABETES MELLITUS WITHOUT COMPLICATION, WITH LONG-TERM CURRENT USE OF INSULIN (HCC): ICD-10-CM

## 2020-01-07 DIAGNOSIS — E11.9 TYPE 2 DIABETES MELLITUS WITHOUT COMPLICATION, WITH LONG-TERM CURRENT USE OF INSULIN (HCC): ICD-10-CM

## 2020-01-07 RX ORDER — METFORMIN HYDROCHLORIDE 500 MG/1
1000 TABLET, EXTENDED RELEASE ORAL 2 TIMES DAILY WITH MEALS
Qty: 360 TABLET | Refills: 0 | Status: SHIPPED | OUTPATIENT
Start: 2020-01-07 | End: 2020-01-31 | Stop reason: SDUPTHER

## 2020-01-13 ENCOUNTER — HOSPITAL ENCOUNTER (OUTPATIENT)
Dept: MRI IMAGING | Facility: HOSPITAL | Age: 60
Discharge: HOME | End: 2020-01-13
Admitting: PHYSICIAN ASSISTANT

## 2020-01-13 DIAGNOSIS — M54.12 CERVICAL RADICULOPATHY: ICD-10-CM

## 2020-01-13 DIAGNOSIS — M50.30 DEGENERATION OF CERVICAL INTERVERTEBRAL DISC: ICD-10-CM

## 2020-01-13 PROCEDURE — 72141 MRI NECK SPINE W/O DYE: CPT

## 2020-01-13 RX ORDER — IRBESARTAN 300 MG/1
TABLET ORAL
Qty: 45 TABLET | Refills: 0 | Status: SHIPPED | OUTPATIENT
Start: 2020-01-13 | End: 2020-04-09

## 2020-01-14 ENCOUNTER — TELEPHONE (OUTPATIENT)
Dept: NEUROSURGERY | Facility: CLINIC | Age: 60
End: 2020-01-14

## 2020-01-14 NOTE — TELEPHONE ENCOUNTER
I did review the C spine MRI done yesterday.  It does show multilevel degenerative disc disease and some disc bulging.  There is a left-sided disc bulge at C4-C5 and more right-sided disc bulge at C5-C6.  There is at least moderate left foraminal narrowing at C4-C5 and mild to moderate right-sided foraminal narrowing at C5-C6.  No severe spinal stenosis.      I called patient to review the results. Left a mssg.     I do think that trying TRUDY would be worthwhile. Could do myelogram if that fails to help.     Last time she was here she was referred to pain mgmt. I do not see that appt in the system.  Will have office check on this.

## 2020-01-31 ENCOUNTER — TELEPHONE (OUTPATIENT)
Dept: FAMILY MEDICINE CLINIC | Facility: CLINIC | Age: 60
End: 2020-01-31

## 2020-01-31 DIAGNOSIS — Z79.4 TYPE 2 DIABETES MELLITUS WITHOUT COMPLICATION, WITH LONG-TERM CURRENT USE OF INSULIN (HCC): ICD-10-CM

## 2020-01-31 DIAGNOSIS — E11.9 TYPE 2 DIABETES MELLITUS WITHOUT COMPLICATION, WITH LONG-TERM CURRENT USE OF INSULIN (HCC): ICD-10-CM

## 2020-01-31 RX ORDER — METFORMIN HYDROCHLORIDE 500 MG/1
1000 TABLET, EXTENDED RELEASE ORAL 2 TIMES DAILY WITH MEALS
Qty: 16 TABLET | Refills: 0 | Status: SHIPPED | OUTPATIENT
Start: 2020-01-31 | End: 2020-04-10 | Stop reason: SDUPTHER

## 2020-02-20 RX ORDER — DULAGLUTIDE 1.5 MG/.5ML
INJECTION, SOLUTION SUBCUTANEOUS
Qty: 4 PEN | Refills: 1 | Status: SHIPPED | OUTPATIENT
Start: 2020-02-20 | End: 2020-04-20

## 2020-02-26 ENCOUNTER — TELEPHONE (OUTPATIENT)
Dept: ENDOCRINOLOGY | Age: 60
End: 2020-02-26

## 2020-02-26 RX ORDER — PIOGLITAZONEHYDROCHLORIDE 15 MG/1
15 TABLET ORAL DAILY
Qty: 30 TABLET | Refills: 5 | Status: SHIPPED | OUTPATIENT
Start: 2020-02-26 | End: 2020-03-09 | Stop reason: ALTCHOICE

## 2020-03-09 ENCOUNTER — OFFICE VISIT (OUTPATIENT)
Dept: ENDOCRINOLOGY | Age: 60
End: 2020-03-09

## 2020-03-09 VITALS
HEART RATE: 93 BPM | SYSTOLIC BLOOD PRESSURE: 130 MMHG | WEIGHT: 148 LBS | BODY MASS INDEX: 25.27 KG/M2 | DIASTOLIC BLOOD PRESSURE: 80 MMHG | HEIGHT: 64 IN | OXYGEN SATURATION: 99 %

## 2020-03-09 DIAGNOSIS — E55.9 VITAMIN D DEFICIENCY: ICD-10-CM

## 2020-03-09 DIAGNOSIS — Z79.4 TYPE 2 DIABETES MELLITUS WITHOUT COMPLICATION, WITH LONG-TERM CURRENT USE OF INSULIN (HCC): Primary | ICD-10-CM

## 2020-03-09 DIAGNOSIS — E11.9 TYPE 2 DIABETES MELLITUS WITHOUT COMPLICATION, WITH LONG-TERM CURRENT USE OF INSULIN (HCC): Primary | ICD-10-CM

## 2020-03-09 DIAGNOSIS — E78.5 HYPERLIPIDEMIA, UNSPECIFIED HYPERLIPIDEMIA TYPE: ICD-10-CM

## 2020-03-09 DIAGNOSIS — I10 ESSENTIAL HYPERTENSION: ICD-10-CM

## 2020-03-09 DIAGNOSIS — M85.89 OSTEOPENIA OF MULTIPLE SITES: ICD-10-CM

## 2020-03-09 LAB
25(OH)D3+25(OH)D2 SERPL-MCNC: 44.1 NG/ML (ref 30–100)
ALBUMIN SERPL-MCNC: 4.4 G/DL (ref 3.5–5.2)
ALBUMIN/GLOB SERPL: 1.9 G/DL
ALP SERPL-CCNC: 94 U/L (ref 39–117)
ALT SERPL-CCNC: 14 U/L (ref 1–33)
AST SERPL-CCNC: 13 U/L (ref 1–32)
BILIRUB SERPL-MCNC: 0.4 MG/DL (ref 0.2–1.2)
BUN SERPL-MCNC: 6 MG/DL (ref 8–23)
BUN/CREAT SERPL: 8.8 (ref 7–25)
CALCIUM SERPL-MCNC: 9.4 MG/DL (ref 8.6–10.5)
CHLORIDE SERPL-SCNC: 99 MMOL/L (ref 98–107)
CHOLEST SERPL-MCNC: 137 MG/DL (ref 0–200)
CO2 SERPL-SCNC: 27.3 MMOL/L (ref 22–29)
CREAT SERPL-MCNC: 0.68 MG/DL (ref 0.57–1)
GLOBULIN SER CALC-MCNC: 2.3 GM/DL
GLUCOSE SERPL-MCNC: 122 MG/DL (ref 65–99)
HBA1C MFR BLD: 7.6 % (ref 4.8–5.6)
HDLC SERPL-MCNC: 32 MG/DL (ref 40–60)
INTERPRETATION: NORMAL
LDLC SERPL CALC-MCNC: 64 MG/DL (ref 0–100)
Lab: NORMAL
POTASSIUM SERPL-SCNC: 4.6 MMOL/L (ref 3.5–5.2)
PROT SERPL-MCNC: 6.7 G/DL (ref 6–8.5)
SODIUM SERPL-SCNC: 139 MMOL/L (ref 136–145)
TRIGL SERPL-MCNC: 204 MG/DL (ref 0–150)
TSH SERPL DL<=0.005 MIU/L-ACNC: 1.66 UIU/ML (ref 0.27–4.2)
VLDLC SERPL CALC-MCNC: 40.8 MG/DL

## 2020-03-09 PROCEDURE — 99214 OFFICE O/P EST MOD 30 MIN: CPT | Performed by: INTERNAL MEDICINE

## 2020-03-09 RX ORDER — GLIMEPIRIDE 4 MG/1
TABLET ORAL
Qty: 45 TABLET | Refills: 5 | Status: SHIPPED | OUTPATIENT
Start: 2020-03-09 | End: 2020-04-24

## 2020-04-09 RX ORDER — IRBESARTAN 300 MG/1
TABLET ORAL
Qty: 45 TABLET | Refills: 1 | Status: SHIPPED | OUTPATIENT
Start: 2020-04-09 | End: 2020-10-05

## 2020-04-10 ENCOUNTER — TELEPHONE (OUTPATIENT)
Dept: FAMILY MEDICINE CLINIC | Facility: CLINIC | Age: 60
End: 2020-04-10

## 2020-04-10 DIAGNOSIS — Z79.4 TYPE 2 DIABETES MELLITUS WITHOUT COMPLICATION, WITH LONG-TERM CURRENT USE OF INSULIN (HCC): ICD-10-CM

## 2020-04-10 DIAGNOSIS — E11.9 TYPE 2 DIABETES MELLITUS WITHOUT COMPLICATION, WITH LONG-TERM CURRENT USE OF INSULIN (HCC): ICD-10-CM

## 2020-04-10 RX ORDER — METFORMIN HYDROCHLORIDE 500 MG/1
1000 TABLET, EXTENDED RELEASE ORAL 2 TIMES DAILY WITH MEALS
Qty: 360 TABLET | Refills: 1 | Status: SHIPPED | OUTPATIENT
Start: 2020-04-10 | End: 2020-10-05

## 2020-04-10 NOTE — TELEPHONE ENCOUNTER
Please have patient call endocrinology to see what their recommendation is as they are managing diabetes.

## 2020-04-10 NOTE — TELEPHONE ENCOUNTER
PATENT STATES THAT HER PHARMACY INFORMED HER THAT metFORMIN ER (GLUCOPHAGE-XR) 500 MG 24 hr tablet WAS GOING TO BE DISCONTINUED. PATIENT CALLING TO GET AN ALTERATIVE MEDICINE.    PLEASE ADVISE

## 2020-04-10 NOTE — TELEPHONE ENCOUNTER
Pt called and stated she is going to call Dr. Douglas's office to see if they will give an alternative     Pt call back   [phone]

## 2020-04-20 RX ORDER — DULAGLUTIDE 1.5 MG/.5ML
INJECTION, SOLUTION SUBCUTANEOUS
Qty: 4 PEN | Refills: 3 | Status: SHIPPED | OUTPATIENT
Start: 2020-04-20 | End: 2020-08-14

## 2020-04-24 DIAGNOSIS — E11.9 TYPE 2 DIABETES MELLITUS WITHOUT COMPLICATION, WITH LONG-TERM CURRENT USE OF INSULIN (HCC): Primary | ICD-10-CM

## 2020-04-24 DIAGNOSIS — Z79.4 TYPE 2 DIABETES MELLITUS WITHOUT COMPLICATION, WITH LONG-TERM CURRENT USE OF INSULIN (HCC): Primary | ICD-10-CM

## 2020-04-24 RX ORDER — GLIMEPIRIDE 4 MG/1
TABLET ORAL
Qty: 60 TABLET | Refills: 5 | Status: SHIPPED | OUTPATIENT
Start: 2020-04-24 | End: 2020-07-20 | Stop reason: SDUPTHER

## 2020-05-18 DIAGNOSIS — E78.5 HYPERLIPIDEMIA, UNSPECIFIED HYPERLIPIDEMIA TYPE: ICD-10-CM

## 2020-05-18 RX ORDER — ATORVASTATIN CALCIUM 40 MG/1
40 TABLET, FILM COATED ORAL DAILY
Qty: 30 TABLET | Refills: 5 | Status: SHIPPED | OUTPATIENT
Start: 2020-05-18 | End: 2020-11-12

## 2020-06-08 NOTE — TELEPHONE ENCOUNTER
Pt requested Nabumetone 500mg # 60 bid Last RF 12/18/19 # 60 + 3 RF.  Last OV 12/18/19, no future appt.

## 2020-06-09 RX ORDER — NABUMETONE 500 MG/1
TABLET, FILM COATED ORAL
Qty: 60 TABLET | Refills: 3 | Status: SHIPPED | OUTPATIENT
Start: 2020-06-09 | End: 2020-10-01

## 2020-07-08 NOTE — PROGRESS NOTES
Subjective   Rafiq Yost is a 60 y.o. female.     F/u for dm 2, hypertension, hyperlipidemia/ testing bs 2 x day / last dm eye exam 2019 has appt 20/ last dm eye exam 3/9/20 with dr Douglas      Patient is a 59-year-old female who came in for follow-up. She has known diabetes mellitus since . He has been on glimepiride 4 mg 1/2 tab q AM and 1 tab q PM,  metformin  mg 2 tablets twice a day,  Farxiga 10 mg/day and Trulicity 1.5 mg weekly.  She had nausea with Cycloset.  FBS .  RBS .  She denies any severe hypoglycemia.  She has lost 7 lbs since 3/20.  Her last meal was last night.      Last eye examination was in  with Dr. Brown. There was no retinopathy. She has early cataracts. She has no microalbuminuria on urine sample taken last .  She is on a irbesartan.  She denies any paresthesias.      She has hyperlipidemia and has been on Lipitor 40 mg once a day. She denies any myalgia.    She is  2 para 1. She had a previous hysterectomy. She is not on hormone replacement therapy. She had a bone density done in on 2017 which showed osteopenia of the lumbar spine and right hip.  Her 10 year probability of major osteoporotic fracture is low at 6.6% and of hip fracture at 0.3%. . She has vitamin D deficiency and is on vitamin D 2000 units per day.  Her mother has history of osteoporosis.  She has no parental history of hip fracture.  She denies alcohol or tobacco use.       Bone density was in 2019 which showed osteopenia which has worsened since 2017.  Her 10-year probability of major osteoporotic fracture and of hip fracture are low at 7.4% and 0.5% respectively.     She has hypertension and is on irbesartan 150 mg once a day.  She has no previous history of heart attack or stroke.  She denies chest pain or shortness of breath.     She has not had a colonoscopy.  She had a normal Cologuard in 2017.  She denies bowel complaints.    The following portions of the  "patient's history were reviewed and updated as appropriate: allergies, current medications, past family history, past medical history, past social history, past surgical history and problem list.    Review of Systems   Constitutional: Negative.  Negative for chills, fatigue and fever.   HENT: Negative.    Eyes: Negative.  Negative for visual disturbance.   Respiratory: Negative.  Negative for chest tightness, shortness of breath and wheezing.    Cardiovascular: Negative.  Negative for chest pain, palpitations and leg swelling.   Gastrointestinal: Negative.  Negative for abdominal distention and abdominal pain.   Endocrine: Negative.    Genitourinary: Negative.  Negative for difficulty urinating, frequency and urgency.   Musculoskeletal: Negative.  Negative for back pain, joint swelling and neck pain.   Skin: Negative.  Negative for color change, rash and wound.   Neurological: Negative.  Negative for dizziness, tremors, seizures, light-headedness, numbness and headaches.   Hematological: Negative.  Does not bruise/bleed easily.   Psychiatric/Behavioral: Negative.      Objective      Vitals:    07/20/20 0947   BP: 142/86   BP Location: Right arm   Patient Position: Sitting   Cuff Size: Small Adult   Pulse: 85   SpO2: 99%   Weight: 64.1 kg (141 lb 6.4 oz)   Height: 162.6 cm (64.02\")     Physical Exam   Constitutional: She is oriented to person, place, and time. She appears well-developed and well-nourished. No distress.   HENT:   Head: Normocephalic.   Right Ear: External ear normal.   Left Ear: External ear normal.   Nose: Nose normal.   Mouth/Throat: Oropharynx is clear and moist. No oropharyngeal exudate.   Eyes: Conjunctivae and EOM are normal. Right eye exhibits no discharge. Left eye exhibits no discharge. No scleral icterus.   Neck: Neck supple. No JVD present. No tracheal deviation present. No thyromegaly present.   Cardiovascular: Normal rate, regular rhythm, normal heart sounds and intact distal pulses. Exam " reveals no gallop and no friction rub.   No murmur heard.  Pulmonary/Chest: Effort normal and breath sounds normal. No stridor. No respiratory distress. She has no wheezes. She has no rales. She exhibits no tenderness.   Abdominal: Soft. Bowel sounds are normal. She exhibits no distension and no mass. There is no tenderness. There is no rebound and no guarding.   Musculoskeletal: Normal range of motion. She exhibits no edema, tenderness or deformity.   Lymphadenopathy:     She has no cervical adenopathy.   Neurological: She is alert and oriented to person, place, and time. She displays normal reflexes. She exhibits normal muscle tone. Coordination normal.   Skin: Skin is warm and dry. No rash noted. She is not diaphoretic. No erythema. No pallor.   Psychiatric: She has a normal mood and affect. Her behavior is normal.     Office Visit on 03/09/2020   Component Date Value Ref Range Status   • Glucose 03/09/2020 122* 65 - 99 mg/dL Final   • BUN 03/09/2020 6* 8 - 23 mg/dL Final   • Creatinine 03/09/2020 0.68  0.57 - 1.00 mg/dL Final   • eGFR Non  Am 03/09/2020 88  >60 mL/min/1.73 Final   • eGFR African Am 03/09/2020 107  >60 mL/min/1.73 Final   • BUN/Creatinine Ratio 03/09/2020 8.8  7.0 - 25.0 Final   • Sodium 03/09/2020 139  136 - 145 mmol/L Final   • Potassium 03/09/2020 4.6  3.5 - 5.2 mmol/L Final   • Chloride 03/09/2020 99  98 - 107 mmol/L Final   • Total CO2 03/09/2020 27.3  22.0 - 29.0 mmol/L Final   • Calcium 03/09/2020 9.4  8.6 - 10.5 mg/dL Final   • Total Protein 03/09/2020 6.7  6.0 - 8.5 g/dL Final   • Albumin 03/09/2020 4.40  3.50 - 5.20 g/dL Final   • Globulin 03/09/2020 2.3  gm/dL Final   • A/G Ratio 03/09/2020 1.9  g/dL Final   • Total Bilirubin 03/09/2020 0.4  0.2 - 1.2 mg/dL Final   • Alkaline Phosphatase 03/09/2020 94  39 - 117 U/L Final   • AST (SGOT) 03/09/2020 13  1 - 32 U/L Final   • ALT (SGPT) 03/09/2020 14  1 - 33 U/L Final   • Total Cholesterol 03/09/2020 137  0 - 200 mg/dL Final   •  Triglycerides 03/09/2020 204* 0 - 150 mg/dL Final   • HDL Cholesterol 03/09/2020 32* 40 - 60 mg/dL Final   • VLDL Cholesterol 03/09/2020 40.8  mg/dL Final   • LDL Cholesterol  03/09/2020 64  0 - 100 mg/dL Final   • Hemoglobin A1C 03/09/2020 7.60* 4.80 - 5.60 % Final    Comment: Hemoglobin A1C Ranges:  Increased Risk for Diabetes  5.7% to 6.4%  Diabetes                     >= 6.5%  Diabetic Goal                < 7.0%     • TSH 03/09/2020 1.660  0.270 - 4.200 uIU/mL Final   • 25 Hydroxy, Vitamin D 03/09/2020 44.1  30.0 - 100.0 ng/ml Final    Comment: Results may be falsely increased if patient taking Biotin.  Reference Range for Total Vitamin D 25(OH)  Deficiency <20.0 ng/mL  Insufficiency 21-29 ng/mL  Sufficiency  ng/mL  Toxicity >100 ng/ml     • Interpretation 03/09/2020 Note   Final    Supplemental report is available.   • PDF Image 03/09/2020 Not applicable   Final     Assessment/Plan   Rafiq was seen today for diabetes, hypertension and hyperlipidemia.    Diagnoses and all orders for this visit:    Type 2 diabetes mellitus without complication, with long-term current use of insulin (CMS/[facility])  -     Comprehensive Metabolic Panel  -     Lipid Panel  -     Hemoglobin A1c  -     Microalbumin / Creatinine Urine Ratio - Urine, Clean Catch    Hyperlipidemia, unspecified hyperlipidemia type  -     Comprehensive Metabolic Panel  -     Lipid Panel  -     Vitamin D 25 Hydroxy    Essential hypertension  -     Comprehensive Metabolic Panel    Vitamin D deficiency  -     Comprehensive Metabolic Panel    Osteopenia of multiple sites  -     Comprehensive Metabolic Panel  -     Vitamin D 25 Hydroxy    Other orders  -     Blood Glucose Monitoring Suppl (FREESTYLE INSULINX SYSTEM) w/Device kit; 1 kit 1 (One) Time for 1 dose.  -     glimepiride (Amaryl) 4 MG tablet; 1/2  tab with lunch and 1 tab with supper      Continue glimepiride, metformin ER, Trulicity, and Farxiga.  Continue Lipitor 40 mg/day.  Continue vitamin D 2000  units/day.  Continue irbesartan  Advised to get Cologuard this year.  Advised to get pneumococcal vaccination.  Flu vaccine this fall    Copy of my note sent to Nicki Rogers NP.    RTC 4 mos.

## 2020-07-20 ENCOUNTER — OFFICE VISIT (OUTPATIENT)
Dept: ENDOCRINOLOGY | Age: 60
End: 2020-07-20

## 2020-07-20 VITALS
SYSTOLIC BLOOD PRESSURE: 142 MMHG | HEIGHT: 64 IN | HEART RATE: 85 BPM | DIASTOLIC BLOOD PRESSURE: 86 MMHG | OXYGEN SATURATION: 99 % | WEIGHT: 141.4 LBS | BODY MASS INDEX: 24.14 KG/M2

## 2020-07-20 DIAGNOSIS — E55.9 VITAMIN D DEFICIENCY: ICD-10-CM

## 2020-07-20 DIAGNOSIS — Z79.4 TYPE 2 DIABETES MELLITUS WITHOUT COMPLICATION, WITH LONG-TERM CURRENT USE OF INSULIN (HCC): Primary | ICD-10-CM

## 2020-07-20 DIAGNOSIS — E78.5 HYPERLIPIDEMIA, UNSPECIFIED HYPERLIPIDEMIA TYPE: ICD-10-CM

## 2020-07-20 DIAGNOSIS — E11.9 TYPE 2 DIABETES MELLITUS WITHOUT COMPLICATION, WITH LONG-TERM CURRENT USE OF INSULIN (HCC): Primary | ICD-10-CM

## 2020-07-20 DIAGNOSIS — I10 ESSENTIAL HYPERTENSION: ICD-10-CM

## 2020-07-20 DIAGNOSIS — M85.89 OSTEOPENIA OF MULTIPLE SITES: ICD-10-CM

## 2020-07-20 PROCEDURE — 99214 OFFICE O/P EST MOD 30 MIN: CPT | Performed by: INTERNAL MEDICINE

## 2020-07-20 RX ORDER — SYRINGE AND NEEDLE,INSULIN,1ML 31 GX5/16"
1 SYRINGE, EMPTY DISPOSABLE MISCELLANEOUS ONCE
COMMUNITY
End: 2020-07-20 | Stop reason: SDUPTHER

## 2020-07-20 RX ORDER — SYRINGE AND NEEDLE,INSULIN,1ML 31 GX5/16"
1 SYRINGE, EMPTY DISPOSABLE MISCELLANEOUS ONCE
Qty: 1 KIT | Refills: 0 | Status: SHIPPED | OUTPATIENT
Start: 2020-07-20 | End: 2020-07-20

## 2020-07-20 RX ORDER — GLIMEPIRIDE 4 MG/1
TABLET ORAL
Qty: 60 TABLET | Refills: 5 | Status: SHIPPED | OUTPATIENT
Start: 2020-07-20 | End: 2020-08-27

## 2020-07-21 LAB
25(OH)D3+25(OH)D2 SERPL-MCNC: 52.8 NG/ML (ref 30–100)
ALBUMIN SERPL-MCNC: 4.9 G/DL (ref 3.5–5.2)
ALBUMIN/CREAT UR: 8 MG/G CREAT (ref 0–29)
ALBUMIN/GLOB SERPL: 2.3 G/DL
ALP SERPL-CCNC: 84 U/L (ref 39–117)
ALT SERPL-CCNC: 14 U/L (ref 1–33)
AST SERPL-CCNC: 13 U/L (ref 1–32)
BILIRUB SERPL-MCNC: 0.6 MG/DL (ref 0–1.2)
BUN SERPL-MCNC: 10 MG/DL (ref 8–23)
BUN/CREAT SERPL: 14.9 (ref 7–25)
CALCIUM SERPL-MCNC: 9.8 MG/DL (ref 8.6–10.5)
CHLORIDE SERPL-SCNC: 98 MMOL/L (ref 98–107)
CHOLEST SERPL-MCNC: 139 MG/DL (ref 0–200)
CO2 SERPL-SCNC: 27.4 MMOL/L (ref 22–29)
CREAT SERPL-MCNC: 0.67 MG/DL (ref 0.57–1)
CREAT UR-MCNC: 39.8 MG/DL
GLOBULIN SER CALC-MCNC: 2.1 GM/DL
GLUCOSE SERPL-MCNC: 90 MG/DL (ref 65–99)
HBA1C MFR BLD: 7.4 % (ref 4.8–5.6)
HDLC SERPL-MCNC: 35 MG/DL (ref 40–60)
INTERPRETATION: NORMAL
LDLC SERPL CALC-MCNC: 60 MG/DL (ref 0–100)
Lab: NORMAL
MICROALBUMIN UR-MCNC: 3.3 UG/ML
POTASSIUM SERPL-SCNC: 4.5 MMOL/L (ref 3.5–5.2)
PROT SERPL-MCNC: 7 G/DL (ref 6–8.5)
SODIUM SERPL-SCNC: 139 MMOL/L (ref 136–145)
TRIGL SERPL-MCNC: 221 MG/DL (ref 0–150)
VLDLC SERPL CALC-MCNC: 44.2 MG/DL

## 2020-07-27 ENCOUNTER — TRANSCRIBE ORDERS (OUTPATIENT)
Dept: ADMINISTRATIVE | Facility: HOSPITAL | Age: 60
End: 2020-07-27

## 2020-07-27 DIAGNOSIS — Z12.31 SCREENING MAMMOGRAM, ENCOUNTER FOR: Primary | ICD-10-CM

## 2020-07-27 RX ORDER — LANCETS 28 GAUGE
EACH MISCELLANEOUS
Qty: 300 EACH | Refills: 1 | Status: SHIPPED | OUTPATIENT
Start: 2020-07-27 | End: 2021-01-25

## 2020-07-27 RX ORDER — BLOOD-GLUCOSE METER
1 KIT MISCELLANEOUS ONCE
Qty: 1 EACH | Refills: 0 | Status: SHIPPED | OUTPATIENT
Start: 2020-07-27 | End: 2020-07-27

## 2020-08-14 RX ORDER — DULAGLUTIDE 1.5 MG/.5ML
INJECTION, SOLUTION SUBCUTANEOUS
Qty: 12 PEN | Refills: 1 | Status: SHIPPED | OUTPATIENT
Start: 2020-08-14 | End: 2020-09-15

## 2020-08-27 RX ORDER — GLIMEPIRIDE 4 MG/1
TABLET ORAL
Qty: 90 TABLET | Refills: 1 | Status: SHIPPED | OUTPATIENT
Start: 2020-08-27 | End: 2020-12-14

## 2020-08-28 ENCOUNTER — TELEPHONE (OUTPATIENT)
Dept: ENDOCRINOLOGY | Age: 60
End: 2020-08-28

## 2020-08-28 RX ORDER — CIPROFLOXACIN 500 MG/1
500 TABLET, FILM COATED ORAL 2 TIMES DAILY
Qty: 14 TABLET | Refills: 0 | Status: SHIPPED | OUTPATIENT
Start: 2020-08-28 | End: 2020-09-11

## 2020-08-28 NOTE — TELEPHONE ENCOUNTER
The medicine prescribed to her for her sore on her leg isnt working   She would like for foreign to call her at [phone]

## 2020-08-31 ENCOUNTER — HOSPITAL ENCOUNTER (OUTPATIENT)
Dept: MAMMOGRAPHY | Facility: HOSPITAL | Age: 60
Discharge: HOME | End: 2020-08-31
Admitting: NURSE PRACTITIONER

## 2020-08-31 DIAGNOSIS — Z12.31 SCREENING MAMMOGRAM, ENCOUNTER FOR: ICD-10-CM

## 2020-08-31 PROCEDURE — 77067 SCR MAMMO BI INCL CAD: CPT

## 2020-08-31 PROCEDURE — 77063 BREAST TOMOSYNTHESIS BI: CPT

## 2020-09-03 ENCOUNTER — TELEPHONE (OUTPATIENT)
Dept: ENDOCRINOLOGY | Age: 60
End: 2020-09-03

## 2020-09-03 NOTE — TELEPHONE ENCOUNTER
Patient sent a my chart message  She has appointment in nov but wants to be seen sooner  She is saying that the sore on her leg is not going away  I sent her a message telling her I was going to let you know  Can you find an opening or can you call pt to discuss this matter   Thank you

## 2020-09-10 NOTE — PROGRESS NOTES
Subjective   Rafiq Yost is a 60 y.o. female.     Work in for sore on leg      Patient is 60-year-old female who came in for follow-up.    She has a dry scab on the left leg which became irritated.  It was initially treated with Bactroban and then Cipro p.o.  There is mild tenderness and erythema over the site but no drainage.  She denies any fever.    She has type 2 diabetes mellitus and is on glimepiride 4 mg twice daily, Trulicity, metformin ER, and Farxiga.  Hemoglobin A1c in July 20 27.4%.  She has no known complications    She has hyperlipidemia is on Lipitor 40 mg/day.      The following portions of the patient's history were reviewed and updated as appropriate: allergies, current medications, past family history, past medical history, past social history, past surgical history and problem list.    Review of Systems   Constitutional: Negative.  Negative for chills, fatigue and fever.   HENT: Negative.    Eyes: Negative.    Respiratory: Negative.  Negative for chest tightness, shortness of breath and wheezing.    Cardiovascular: Positive for leg swelling (left leg). Negative for chest pain and palpitations.   Gastrointestinal: Negative for abdominal distention, abdominal pain, constipation and diarrhea.   Endocrine: Negative.    Genitourinary: Negative.  Negative for difficulty urinating, frequency and urgency.   Musculoskeletal: Negative.  Negative for back pain, joint swelling and neck pain.   Neurological: Negative.  Negative for dizziness, tremors, seizures, light-headedness, numbness and headaches.   Hematological: Negative.  Negative for adenopathy. Does not bruise/bleed easily.   Psychiatric/Behavioral: Negative.  Negative for agitation, confusion and sleep disturbance. The patient is not nervous/anxious.      Objective      Vitals:    09/11/20 1533   BP: 166/82   BP Location: Right arm   Patient Position: Sitting   Cuff Size: Small Adult   Pulse: 94   SpO2: 98%   Weight: 65.9 kg (145 lb 3.2 oz)  "  Height: 162.6 cm (64.02\")     Physical Exam   Constitutional: She is oriented to person, place, and time. She appears well-developed and well-nourished. No distress.   HENT:   Head: Normocephalic.   Right Ear: External ear normal.   Left Ear: External ear normal.   Mouth/Throat: Oropharynx is clear and moist.   Eyes: Conjunctivae and EOM are normal. Right eye exhibits no discharge. Left eye exhibits no discharge. No scleral icterus.   Neck: Neck supple. No JVD present. No tracheal deviation present. No thyromegaly present.   Cardiovascular: Normal rate, regular rhythm, normal heart sounds and intact distal pulses. Exam reveals no gallop and no friction rub.   No murmur heard.  Pulmonary/Chest: Effort normal and breath sounds normal. No stridor. She has no wheezes.   Abdominal: Soft. Bowel sounds are normal. She exhibits no distension and no mass. There is no tenderness. There is no rebound and no guarding.   Musculoskeletal: Normal range of motion. She exhibits no edema, tenderness or deformity.   Lymphadenopathy:     She has no cervical adenopathy.   Neurological: She is alert and oriented to person, place, and time. She displays normal reflexes.   Skin: Skin is warm and dry. No erythema. No pallor.   Psychiatric: She has a normal mood and affect. Her behavior is normal.     Office Visit on 07/20/2020   Component Date Value Ref Range Status   • Glucose 07/20/2020 90  65 - 99 mg/dL Final   • BUN 07/20/2020 10  8 - 23 mg/dL Final   • Creatinine 07/20/2020 0.67  0.57 - 1.00 mg/dL Final   • eGFR Non  Am 07/20/2020 89  >60 mL/min/1.73 Final   • eGFR African Am 07/20/2020 108  >60 mL/min/1.73 Final   • BUN/Creatinine Ratio 07/20/2020 14.9  7.0 - 25.0 Final   • Sodium 07/20/2020 139  136 - 145 mmol/L Final   • Potassium 07/20/2020 4.5  3.5 - 5.2 mmol/L Final   • Chloride 07/20/2020 98  98 - 107 mmol/L Final   • Total CO2 07/20/2020 27.4  22.0 - 29.0 mmol/L Final   • Calcium 07/20/2020 9.8  8.6 - 10.5 mg/dL Final "   • Total Protein 07/20/2020 7.0  6.0 - 8.5 g/dL Final   • Albumin 07/20/2020 4.90  3.50 - 5.20 g/dL Final   • Globulin 07/20/2020 2.1  gm/dL Final   • A/G Ratio 07/20/2020 2.3  g/dL Final   • Total Bilirubin 07/20/2020 0.6  0.0 - 1.2 mg/dL Final   • Alkaline Phosphatase 07/20/2020 84  39 - 117 U/L Final   • AST (SGOT) 07/20/2020 13  1 - 32 U/L Final   • ALT (SGPT) 07/20/2020 14  1 - 33 U/L Final   • Total Cholesterol 07/20/2020 139  0 - 200 mg/dL Final   • Triglycerides 07/20/2020 221* 0 - 150 mg/dL Final   • HDL Cholesterol 07/20/2020 35* 40 - 60 mg/dL Final   • VLDL Cholesterol 07/20/2020 44.2  mg/dL Final   • LDL Cholesterol  07/20/2020 60  0 - 100 mg/dL Final   • Hemoglobin A1C 07/20/2020 7.40* 4.80 - 5.60 % Final    Comment: Hemoglobin A1C Ranges:  Increased Risk for Diabetes  5.7% to 6.4%  Diabetes                     >= 6.5%  Diabetic Goal                < 7.0%     • Creatinine, Urine 07/20/2020 39.8  Not Estab. mg/dL Final   • Microalbumin, Urine 07/20/2020 3.3  Not Estab. ug/mL Final   • Microalbumin/Creatinine Ratio 07/20/2020 8  0 - 29 mg/g creat Final    Comment:                        Normal:                0 -  29                         Moderately increased: 30 - 300                         Severely increased:       >300                **Please note reference interval change**     • 25 Hydroxy, Vitamin D 07/20/2020 52.8  30.0 - 100.0 ng/ml Final    Comment: Results may be falsely increased if patient taking Biotin.  Reference Range for Total Vitamin D 25(OH)  Deficiency <20.0 ng/mL  Insufficiency 21-29 ng/mL  Sufficiency  ng/mL  Toxicity >100 ng/ml     • Interpretation 07/20/2020 Note   Final    Supplemental report is available.   • PDF Image 07/20/2020 Not applicable   Final     Assessment/Plan   Diagnoses and all orders for this visit:    Type 2 diabetes mellitus without complication, with long-term current use of insulin (CMS/[facility])    Hyperlipidemia, unspecified hyperlipidemia  type    Essential hypertension    Vitamin D deficiency    Osteopenia of multiple sites    Non-healing skin lesion  -     Ambulatory Referral to General Surgery      Appointment with Dr. Joseph Montaño.  Continue glimepiride, metformin ER, Farxiga, and Trulicity.  Continue Lipitor    RTC as scheduled

## 2020-09-11 ENCOUNTER — OFFICE VISIT (OUTPATIENT)
Dept: ENDOCRINOLOGY | Age: 60
End: 2020-09-11

## 2020-09-11 VITALS
HEART RATE: 94 BPM | SYSTOLIC BLOOD PRESSURE: 166 MMHG | HEIGHT: 64 IN | WEIGHT: 145.2 LBS | BODY MASS INDEX: 24.79 KG/M2 | DIASTOLIC BLOOD PRESSURE: 82 MMHG | OXYGEN SATURATION: 98 %

## 2020-09-11 DIAGNOSIS — E78.5 HYPERLIPIDEMIA, UNSPECIFIED HYPERLIPIDEMIA TYPE: ICD-10-CM

## 2020-09-11 DIAGNOSIS — M85.89 OSTEOPENIA OF MULTIPLE SITES: ICD-10-CM

## 2020-09-11 DIAGNOSIS — L98.9 NON-HEALING SKIN LESION: ICD-10-CM

## 2020-09-11 DIAGNOSIS — E11.9 TYPE 2 DIABETES MELLITUS WITHOUT COMPLICATION, WITH LONG-TERM CURRENT USE OF INSULIN (HCC): Primary | ICD-10-CM

## 2020-09-11 DIAGNOSIS — I10 ESSENTIAL HYPERTENSION: ICD-10-CM

## 2020-09-11 DIAGNOSIS — E55.9 VITAMIN D DEFICIENCY: ICD-10-CM

## 2020-09-11 DIAGNOSIS — Z79.4 TYPE 2 DIABETES MELLITUS WITHOUT COMPLICATION, WITH LONG-TERM CURRENT USE OF INSULIN (HCC): Primary | ICD-10-CM

## 2020-09-11 PROCEDURE — 99213 OFFICE O/P EST LOW 20 MIN: CPT | Performed by: INTERNAL MEDICINE

## 2020-09-11 RX ORDER — BLOOD-GLUCOSE METER
KIT MISCELLANEOUS
COMMUNITY
Start: 2020-07-28

## 2020-09-15 RX ORDER — DAPAGLIFLOZIN 10 MG/1
TABLET, FILM COATED ORAL
Qty: 30 TABLET | Refills: 5 | Status: SHIPPED | OUTPATIENT
Start: 2020-09-15 | End: 2021-04-15

## 2020-09-15 RX ORDER — DULAGLUTIDE 1.5 MG/.5ML
INJECTION, SOLUTION SUBCUTANEOUS
Qty: 4 PEN | Refills: 5 | Status: SHIPPED | OUTPATIENT
Start: 2020-09-15 | End: 2020-11-23 | Stop reason: SDUPTHER

## 2020-10-01 ENCOUNTER — OFFICE VISIT (OUTPATIENT)
Dept: SURGERY | Facility: CLINIC | Age: 60
End: 2020-10-01

## 2020-10-01 VITALS — HEIGHT: 64 IN | BODY MASS INDEX: 24.72 KG/M2 | WEIGHT: 144.8 LBS

## 2020-10-01 DIAGNOSIS — S81.802A WOUND OF LEFT LOWER EXTREMITY, INITIAL ENCOUNTER: Primary | ICD-10-CM

## 2020-10-01 PROCEDURE — 99242 OFF/OP CONSLTJ NEW/EST SF 20: CPT | Performed by: SURGERY

## 2020-10-01 RX ORDER — NEOMYCIN SULFATE, POLYMYXIN B SULFATE AND BACITRACIN ZINC 3.5; 10000; 4 MG/G; [USP'U]/G; [USP'U]/G
OINTMENT OPHTHALMIC 4 TIMES DAILY
COMMUNITY
End: 2020-11-23

## 2020-10-01 NOTE — PROGRESS NOTES
Subjective   Rafiq Yost is a 60 y.o. female who presents to the office in surgical consultation from Nicki Rogers APRN and Iglesia Douglas MD for a chronic wound of the left lower extremity.    History of Present Illness     The patient has a 4 to 6-week history of a wound in her left lower extremity.  She noticed an area of irritation with some surrounding erythema that then opened up with a central ulcer.  She developed some whitish tissue on the ulcer that eventually fell off in the shower.  She is tried to treat it with Bactroban and then took a course of Cipro.  It was very slow to respond despite that treatment and had chronic surrounding erythema.  Over the past week or so it has substantially improved with the development of a scab over the ulcer and improvement in the erythema.  The area of the irritation has decreased.  She does not recall a specific injury.  She has not had cellulitis.  She has not had fevers nor night sweats.    Review of Systems   Constitutional: Negative for fatigue and fever.   Respiratory: Negative for chest tightness and shortness of breath.    Cardiovascular: Negative for chest pain and palpitations.   Gastrointestinal: Negative for abdominal pain, blood in stool, constipation, diarrhea, nausea and vomiting.     Past Medical History:   Diagnosis Date   • Hyperlipidemia    • Hypertension    • Type 2 diabetes mellitus (CMS/HCC)      Past Surgical History:   Procedure Laterality Date   • HYSTERECTOMY       Family History   Problem Relation Age of Onset   • Diabetes Mother    • Hypertension Mother    • Thyroid disease Mother    • Kidney disease Mother         STAGE 4   • Cancer Mother         SKIN    • Atrial fibrillation Mother    • Heart attack Father    • Heart attack Sister    • Thyroid disease Sister    • Cancer Sister         PITUITARY TUMOR    • Diabetes Brother    • Hypertension Brother    • Thyroid disease Sister    • Thyroid disease Sister    • Thyroid disease  Sister    • Hypertension Brother    • Hyperlipidemia Brother    • Breast cancer Neg Hx      Social History     Socioeconomic History   • Marital status:      Spouse name: Not on file   • Number of children: Not on file   • Years of education: Not on file   • Highest education level: Not on file   Occupational History   • Occupation: physical therapy tech     Employer: [employer]   Tobacco Use   • Smoking status: Never Smoker   • Smokeless tobacco: Never Used   Substance and Sexual Activity   • Alcohol use: No   • Drug use: No   • Sexual activity: Defer       Objective   Physical Exam  Constitutional:       Appearance: Normal appearance. She is well-developed. She is not toxic-appearing.   Eyes:      General: No scleral icterus.  Pulmonary:      Effort: Pulmonary effort is normal. No respiratory distress.   Skin:     General: Skin is warm and dry.      Comments: There is a 2 cm area of erythema with raised indurated skin and a central ulcer that is covered with an intact scab.  There is no abscess nor infection.  There is no cellulitis.  It appears inflammatory and nonmalignant.   Neurological:      Mental Status: She is alert and oriented to person, place, and time.   Psychiatric:         Behavior: Behavior normal.         Thought Content: Thought content normal.         Judgment: Judgment normal.         Assessment/Plan       The encounter diagnosis was Wound of left lower extremity, initial encounter.    The patient has a chronic wound of the left lower extremity that is starting to heal much faster.  There is no infection present.  There is no need for debridement at this time.  She was instructed on local wound care.  She will follow-up in 1 month.

## 2020-10-05 DIAGNOSIS — Z79.4 TYPE 2 DIABETES MELLITUS WITHOUT COMPLICATION, WITH LONG-TERM CURRENT USE OF INSULIN (HCC): ICD-10-CM

## 2020-10-05 DIAGNOSIS — E11.9 TYPE 2 DIABETES MELLITUS WITHOUT COMPLICATION, WITH LONG-TERM CURRENT USE OF INSULIN (HCC): ICD-10-CM

## 2020-10-05 RX ORDER — IRBESARTAN 300 MG/1
TABLET ORAL
Qty: 45 TABLET | Refills: 1 | Status: SHIPPED | OUTPATIENT
Start: 2020-10-05 | End: 2021-03-31

## 2020-10-05 RX ORDER — METFORMIN HYDROCHLORIDE 500 MG/1
TABLET, EXTENDED RELEASE ORAL
Qty: 360 TABLET | Refills: 1 | Status: SHIPPED | OUTPATIENT
Start: 2020-10-05 | End: 2021-03-31

## 2020-11-12 DIAGNOSIS — E78.5 HYPERLIPIDEMIA, UNSPECIFIED HYPERLIPIDEMIA TYPE: ICD-10-CM

## 2020-11-12 RX ORDER — ATORVASTATIN CALCIUM 40 MG/1
40 TABLET, FILM COATED ORAL DAILY
Qty: 30 TABLET | Refills: 5 | Status: SHIPPED | OUTPATIENT
Start: 2020-11-12 | End: 2021-05-06

## 2020-11-12 NOTE — TELEPHONE ENCOUNTER
Pt req rfills next ov 11/23/20   [Dear  ___] : Dear  [unfilled], [Consult Letter:] : I had the pleasure of evaluating your patient, [unfilled]. [Sincerely,] : Sincerely, [Consult Closing:] : Thank you very much for allowing me to participate in the care of this patient.  If you have any questions, please do not hesitate to contact me. [FreeTextEntry3] : Troy White MD\par

## 2020-11-15 NOTE — PROGRESS NOTES
Subjective   Rafiq Yost is a 60 y.o. female.     F/u for dm 2, hyperlipidemia / testing bs 2 x day / last dm eye exam 7/27/20 with dr Brown  / last dm foot exam 9/11/20 with dr Douglas / flu vaccine @ [facility]      Patient is 60-year-old female who came in for follow-up.     She has type 2 diabetes mellitus and is on glimepiride 4 mg twice daily, Trulicity 1.5 mg, metformin ER, and Farxiga.    Fasting glucose 108-169.  Evening glucose .  Her last meal was last night.     Her last eye examination was in July 2020.  She has no retinopathy urine microalbumin is normal in July 2020.  She denies numbness, tingling or burning in her hands or feet.    She has hyperlipidemia is on Lipitor 40 mg/day.  She denies myalgia.    She has hypertension and is on irbesartan 300 mg once a day.  She denies chest pain or shortness of breath.    She has nasal stuffiness for the past 3 weeks.  She denies fever or chills.  She has occasional cough.  She is on Claritin and Flonase.    She has osteopenia on bone density done in July 2019.  Her 10-year risk for moderate osteoporotic fracture is seven-point percent and for hip fracture is 0.5%.  She has vitamin D deficiency and is on vitamin D3 2000 units/day.    She had flu vaccine this fall.  She has been having nasal congestion and postnasal drip for the past 3 weeks without associated fever or chills.     The following portions of the patient's history were reviewed and updated as appropriate: allergies, current medications, past family history, past medical history, past social history, past surgical history and problem list.    Review of Systems   Constitutional: Negative for chills, fatigue and fever.   HENT: Positive for congestion and sinus pressure.    Eyes: Negative for discharge and visual disturbance.   Respiratory: Negative for chest tightness, shortness of breath and wheezing.    Cardiovascular: Negative for chest pain, palpitations and leg swelling.   Gastrointestinal: Negative  "for abdominal distention, abdominal pain, constipation and diarrhea.   Endocrine: Negative.  Negative for cold intolerance, heat intolerance and polyuria.   Genitourinary: Negative.  Negative for difficulty urinating, frequency and urgency.   Musculoskeletal: Negative.  Negative for back pain, joint swelling and neck pain.   Neurological: Negative.  Negative for dizziness, tremors, seizures, light-headedness, numbness and headaches.   Hematological: Negative.  Negative for adenopathy. Does not bruise/bleed easily.   Psychiatric/Behavioral: Negative.  Negative for agitation, confusion and sleep disturbance. The patient is not nervous/anxious.      Objective      Vitals:    11/23/20 1132   BP: 152/100   BP Location: Right arm   Patient Position: Sitting   Cuff Size: Small Adult   Pulse: 93   SpO2: 99%   Weight: 63.7 kg (140 lb 6.4 oz)   Height: 162.6 cm (64.02\")     Physical Exam  Constitutional:       General: She is not in acute distress.     Appearance: Normal appearance. She is obese. She is not ill-appearing, toxic-appearing or diaphoretic.   Eyes:      General: No scleral icterus.        Right eye: No discharge.         Left eye: No discharge.      Extraocular Movements: Extraocular movements intact.      Conjunctiva/sclera: Conjunctivae normal.   Neck:      Vascular: No carotid bruit.   Cardiovascular:      Rate and Rhythm: Normal rate and regular rhythm.   Pulmonary:      Effort: Pulmonary effort is normal. No respiratory distress.      Breath sounds: Normal breath sounds. No stridor. No wheezing or rales.   Chest:      Chest wall: No tenderness.   Abdominal:      General: There is no distension.      Palpations: Abdomen is soft. There is no mass.      Tenderness: There is no abdominal tenderness. There is no right CVA tenderness, left CVA tenderness, guarding or rebound.      Hernia: No hernia is present.   Lymphadenopathy:      Cervical: No cervical adenopathy.   Skin:     General: Skin is warm and dry.      " Coloration: Skin is not jaundiced or pale.      Findings: No bruising, erythema, lesion or rash.   Neurological:      General: No focal deficit present.      Mental Status: She is alert and oriented to person, place, and time.      Comments: Intact light touch on lower ext   Psychiatric:         Mood and Affect: Mood normal.         Behavior: Behavior normal.       Office Visit on 07/20/2020   Component Date Value Ref Range Status   • Glucose 07/20/2020 90  65 - 99 mg/dL Final   • BUN 07/20/2020 10  8 - 23 mg/dL Final   • Creatinine 07/20/2020 0.67  0.57 - 1.00 mg/dL Final   • eGFR Non  Am 07/20/2020 89  >60 mL/min/1.73 Final   • eGFR African Am 07/20/2020 108  >60 mL/min/1.73 Final   • BUN/Creatinine Ratio 07/20/2020 14.9  7.0 - 25.0 Final   • Sodium 07/20/2020 139  136 - 145 mmol/L Final   • Potassium 07/20/2020 4.5  3.5 - 5.2 mmol/L Final   • Chloride 07/20/2020 98  98 - 107 mmol/L Final   • Total CO2 07/20/2020 27.4  22.0 - 29.0 mmol/L Final   • Calcium 07/20/2020 9.8  8.6 - 10.5 mg/dL Final   • Total Protein 07/20/2020 7.0  6.0 - 8.5 g/dL Final   • Albumin 07/20/2020 4.90  3.50 - 5.20 g/dL Final   • Globulin 07/20/2020 2.1  gm/dL Final   • A/G Ratio 07/20/2020 2.3  g/dL Final   • Total Bilirubin 07/20/2020 0.6  0.0 - 1.2 mg/dL Final   • Alkaline Phosphatase 07/20/2020 84  39 - 117 U/L Final   • AST (SGOT) 07/20/2020 13  1 - 32 U/L Final   • ALT (SGPT) 07/20/2020 14  1 - 33 U/L Final   • Total Cholesterol 07/20/2020 139  0 - 200 mg/dL Final   • Triglycerides 07/20/2020 221* 0 - 150 mg/dL Final   • HDL Cholesterol 07/20/2020 35* 40 - 60 mg/dL Final   • VLDL Cholesterol 07/20/2020 44.2  mg/dL Final   • LDL Cholesterol  07/20/2020 60  0 - 100 mg/dL Final   • Hemoglobin A1C 07/20/2020 7.40* 4.80 - 5.60 % Final    Comment: Hemoglobin A1C Ranges:  Increased Risk for Diabetes  5.7% to 6.4%  Diabetes                     >= 6.5%  Diabetic Goal                < 7.0%     • Creatinine, Urine 07/20/2020 39.8  Not  Estab. mg/dL Final   • Microalbumin, Urine 07/20/2020 3.3  Not Estab. ug/mL Final   • Microalbumin/Creatinine Ratio 07/20/2020 8  0 - 29 mg/g creat Final    Comment:                        Normal:                0 -  29                         Moderately increased: 30 - 300                         Severely increased:       >300                **Please note reference interval change**     • 25 Hydroxy, Vitamin D 07/20/2020 52.8  30.0 - 100.0 ng/ml Final    Comment: Results may be falsely increased if patient taking Biotin.  Reference Range for Total Vitamin D 25(OH)  Deficiency <20.0 ng/mL  Insufficiency 21-29 ng/mL  Sufficiency  ng/mL  Toxicity >100 ng/ml     • Interpretation 07/20/2020 Note   Final    Supplemental report is available.   • PDF Image 07/20/2020 Not applicable   Final     Assessment/Plan   Diagnoses and all orders for this visit:    1. Type 2 diabetes mellitus without complication, with long-term current use of insulin (CMS/[facility]) (Primary)  -     Comprehensive Metabolic Panel  -     Hemoglobin A1c  -     TSH  -     T4, Free    2. Hyperlipidemia, unspecified hyperlipidemia type  -     Comprehensive Metabolic Panel  -     Lipid Panel  -     TSH  -     T4, Free    3. Essential hypertension  -     Comprehensive Metabolic Panel    4. Vitamin D deficiency  -     Comprehensive Metabolic Panel  -     Vitamin D 25 Hydroxy    5. Osteopenia of multiple sites  -     Comprehensive Metabolic Panel  -     Vitamin D 25 Hydroxy    Other orders  -     Dulaglutide (Trulicity) 1.5 MG/0.5ML solution pen-injector; Inject 1.5 mg under the skin into the appropriate area as directed Every 7 (Seven) Days.  Dispense: 4 pen; Refill: 5      Continue glimepiride, Trulicity, Metformin ER, and Farxiga.  Continue Lipitor 40 mg/day.  Start amlodipine 2.5 mg once a day.  Continue irbesartan 300 mg/day.  Start Omnicef 300 mg twice a day for 7 days.  Continue Claritin and Flonase.  Follow-up with Nicki Rogers NP if not  better.    Follow-up in 4 months    Copy of my note sent to Nicki Rogers NP

## 2020-11-23 ENCOUNTER — OFFICE VISIT (OUTPATIENT)
Dept: ENDOCRINOLOGY | Age: 60
End: 2020-11-23

## 2020-11-23 VITALS
HEART RATE: 93 BPM | DIASTOLIC BLOOD PRESSURE: 100 MMHG | WEIGHT: 140.4 LBS | SYSTOLIC BLOOD PRESSURE: 152 MMHG | OXYGEN SATURATION: 99 % | HEIGHT: 64 IN | BODY MASS INDEX: 23.97 KG/M2

## 2020-11-23 DIAGNOSIS — M85.89 OSTEOPENIA OF MULTIPLE SITES: ICD-10-CM

## 2020-11-23 DIAGNOSIS — E55.9 VITAMIN D DEFICIENCY: ICD-10-CM

## 2020-11-23 DIAGNOSIS — I10 ESSENTIAL HYPERTENSION: ICD-10-CM

## 2020-11-23 DIAGNOSIS — E11.9 TYPE 2 DIABETES MELLITUS WITHOUT COMPLICATION, WITH LONG-TERM CURRENT USE OF INSULIN (HCC): Primary | ICD-10-CM

## 2020-11-23 DIAGNOSIS — E78.5 HYPERLIPIDEMIA, UNSPECIFIED HYPERLIPIDEMIA TYPE: ICD-10-CM

## 2020-11-23 DIAGNOSIS — Z79.4 TYPE 2 DIABETES MELLITUS WITHOUT COMPLICATION, WITH LONG-TERM CURRENT USE OF INSULIN (HCC): Primary | ICD-10-CM

## 2020-11-23 PROCEDURE — 99214 OFFICE O/P EST MOD 30 MIN: CPT | Performed by: INTERNAL MEDICINE

## 2020-11-23 RX ORDER — AMLODIPINE BESYLATE 2.5 MG/1
2.5 TABLET ORAL DAILY
Qty: 90 TABLET | Refills: 5 | Status: SHIPPED | OUTPATIENT
Start: 2020-11-23 | End: 2022-02-17 | Stop reason: SDUPTHER

## 2020-11-23 RX ORDER — CEFDINIR 300 MG/1
300 CAPSULE ORAL 2 TIMES DAILY
Qty: 14 CAPSULE | Refills: 0 | Status: SHIPPED | OUTPATIENT
Start: 2020-11-23 | End: 2021-05-24

## 2020-11-23 RX ORDER — DULAGLUTIDE 1.5 MG/.5ML
1.5 INJECTION, SOLUTION SUBCUTANEOUS
Qty: 2 ML | Refills: 5 | Status: SHIPPED | OUTPATIENT
Start: 2020-11-23 | End: 2021-06-21 | Stop reason: SDUPTHER

## 2020-11-24 LAB
25(OH)D3+25(OH)D2 SERPL-MCNC: 44 NG/ML (ref 30–100)
ALBUMIN SERPL-MCNC: 4.5 G/DL (ref 3.5–5.2)
ALBUMIN/GLOB SERPL: 1.8 G/DL
ALP SERPL-CCNC: 123 U/L (ref 39–117)
ALT SERPL-CCNC: 22 U/L (ref 1–33)
AST SERPL-CCNC: 16 U/L (ref 1–32)
BILIRUB SERPL-MCNC: 0.5 MG/DL (ref 0–1.2)
BUN SERPL-MCNC: 9 MG/DL (ref 8–23)
BUN/CREAT SERPL: 12.9 (ref 7–25)
CALCIUM SERPL-MCNC: 9.8 MG/DL (ref 8.6–10.5)
CHLORIDE SERPL-SCNC: 97 MMOL/L (ref 98–107)
CHOLEST SERPL-MCNC: 162 MG/DL (ref 0–200)
CO2 SERPL-SCNC: 28.7 MMOL/L (ref 22–29)
CREAT SERPL-MCNC: 0.7 MG/DL (ref 0.57–1)
GLOBULIN SER CALC-MCNC: 2.5 GM/DL
GLUCOSE SERPL-MCNC: 107 MG/DL (ref 65–99)
HBA1C MFR BLD: 8.1 % (ref 4.8–5.6)
HDLC SERPL-MCNC: 45 MG/DL (ref 40–60)
INTERPRETATION: NORMAL
LDLC SERPL CALC-MCNC: 75 MG/DL (ref 0–100)
Lab: NORMAL
POTASSIUM SERPL-SCNC: 4.6 MMOL/L (ref 3.5–5.2)
PROT SERPL-MCNC: 7 G/DL (ref 6–8.5)
SODIUM SERPL-SCNC: 134 MMOL/L (ref 136–145)
T4 FREE SERPL-MCNC: 1.32 NG/DL (ref 0.93–1.7)
TRIGL SERPL-MCNC: 257 MG/DL (ref 0–150)
TSH SERPL DL<=0.005 MIU/L-ACNC: 1.08 UIU/ML (ref 0.27–4.2)
VLDLC SERPL CALC-MCNC: 42 MG/DL (ref 5–40)

## 2020-12-14 RX ORDER — GLIMEPIRIDE 4 MG/1
TABLET ORAL
Qty: 135 TABLET | Refills: 2 | Status: SHIPPED | OUTPATIENT
Start: 2020-12-14 | End: 2021-09-01 | Stop reason: SDUPTHER

## 2021-01-25 RX ORDER — LANCETS 28 GAUGE
EACH MISCELLANEOUS
Qty: 300 EACH | Refills: 3 | Status: SHIPPED | OUTPATIENT
Start: 2021-01-25

## 2021-01-26 RX ORDER — BLOOD SUGAR DIAGNOSTIC
STRIP MISCELLANEOUS
Qty: 300 EACH | Refills: 2 | Status: SHIPPED | OUTPATIENT
Start: 2021-01-26 | End: 2022-07-25 | Stop reason: SDUPTHER

## 2021-01-29 ENCOUNTER — IMMUNIZATION (OUTPATIENT)
Dept: VACCINE CLINIC | Facility: HOSPITAL | Age: 61
End: 2021-01-29

## 2021-01-29 PROCEDURE — 91300 HC SARSCOV02 VAC 30MCG/0.3ML IM: CPT | Performed by: INTERNAL MEDICINE

## 2021-01-29 PROCEDURE — 0001A: CPT | Performed by: INTERNAL MEDICINE

## 2021-02-19 ENCOUNTER — IMMUNIZATION (OUTPATIENT)
Dept: VACCINE CLINIC | Facility: HOSPITAL | Age: 61
End: 2021-02-19

## 2021-02-19 PROCEDURE — 91300 HC SARSCOV02 VAC 30MCG/0.3ML IM: CPT | Performed by: INTERNAL MEDICINE

## 2021-02-19 PROCEDURE — 0002A: CPT | Performed by: INTERNAL MEDICINE

## 2021-03-31 DIAGNOSIS — E11.9 TYPE 2 DIABETES MELLITUS WITHOUT COMPLICATION, WITH LONG-TERM CURRENT USE OF INSULIN (HCC): ICD-10-CM

## 2021-03-31 DIAGNOSIS — Z79.4 TYPE 2 DIABETES MELLITUS WITHOUT COMPLICATION, WITH LONG-TERM CURRENT USE OF INSULIN (HCC): ICD-10-CM

## 2021-03-31 RX ORDER — METFORMIN HYDROCHLORIDE 500 MG/1
TABLET, EXTENDED RELEASE ORAL
Qty: 360 TABLET | Refills: 2 | Status: SHIPPED | OUTPATIENT
Start: 2021-03-31 | End: 2022-01-03 | Stop reason: SDUPTHER

## 2021-03-31 RX ORDER — IRBESARTAN 300 MG/1
TABLET ORAL
Qty: 45 TABLET | Refills: 2 | Status: SHIPPED | OUTPATIENT
Start: 2021-03-31 | End: 2021-11-08 | Stop reason: SDUPTHER

## 2021-04-15 RX ORDER — DAPAGLIFLOZIN 10 MG/1
TABLET, FILM COATED ORAL
Qty: 30 TABLET | Refills: 5 | Status: SHIPPED | OUTPATIENT
Start: 2021-04-15 | End: 2021-10-19 | Stop reason: SDUPTHER

## 2021-05-06 DIAGNOSIS — E78.5 HYPERLIPIDEMIA, UNSPECIFIED HYPERLIPIDEMIA TYPE: ICD-10-CM

## 2021-05-06 RX ORDER — ATORVASTATIN CALCIUM 40 MG/1
40 TABLET, FILM COATED ORAL DAILY
Qty: 90 TABLET | Refills: 1 | Status: SHIPPED | OUTPATIENT
Start: 2021-05-06 | End: 2021-11-15 | Stop reason: SDUPTHER

## 2021-05-12 ENCOUNTER — PRIOR AUTHORIZATION (OUTPATIENT)
Dept: ENDOCRINOLOGY | Age: 61
End: 2021-05-12

## 2021-05-24 ENCOUNTER — OFFICE VISIT (OUTPATIENT)
Dept: ENDOCRINOLOGY | Age: 61
End: 2021-05-24

## 2021-05-24 VITALS
SYSTOLIC BLOOD PRESSURE: 116 MMHG | HEIGHT: 64 IN | DIASTOLIC BLOOD PRESSURE: 70 MMHG | BODY MASS INDEX: 23.49 KG/M2 | WEIGHT: 137.6 LBS

## 2021-05-24 DIAGNOSIS — M85.89 OSTEOPENIA OF MULTIPLE SITES: ICD-10-CM

## 2021-05-24 DIAGNOSIS — E78.5 HYPERLIPIDEMIA, UNSPECIFIED HYPERLIPIDEMIA TYPE: ICD-10-CM

## 2021-05-24 DIAGNOSIS — Z79.4 TYPE 2 DIABETES MELLITUS WITHOUT COMPLICATION, WITH LONG-TERM CURRENT USE OF INSULIN (HCC): Primary | ICD-10-CM

## 2021-05-24 DIAGNOSIS — E55.9 VITAMIN D DEFICIENCY: ICD-10-CM

## 2021-05-24 DIAGNOSIS — I10 ESSENTIAL HYPERTENSION: ICD-10-CM

## 2021-05-24 DIAGNOSIS — E11.9 TYPE 2 DIABETES MELLITUS WITHOUT COMPLICATION, WITH LONG-TERM CURRENT USE OF INSULIN (HCC): Primary | ICD-10-CM

## 2021-05-24 LAB
25(OH)D3+25(OH)D2 SERPL-MCNC: 60.1 NG/ML (ref 30–100)
ALBUMIN SERPL-MCNC: 4.8 G/DL (ref 3.5–5.2)
ALBUMIN/GLOB SERPL: 2.4 G/DL
ALP SERPL-CCNC: 97 U/L (ref 39–117)
ALT SERPL-CCNC: 12 U/L (ref 1–33)
AST SERPL-CCNC: 11 U/L (ref 1–32)
BILIRUB SERPL-MCNC: 0.4 MG/DL (ref 0–1.2)
BUN SERPL-MCNC: 10 MG/DL (ref 8–23)
BUN/CREAT SERPL: 14.1 (ref 7–25)
CALCIUM SERPL-MCNC: 10.3 MG/DL (ref 8.6–10.5)
CHLORIDE SERPL-SCNC: 101 MMOL/L (ref 98–107)
CHOLEST SERPL-MCNC: 168 MG/DL (ref 0–200)
CO2 SERPL-SCNC: 27.9 MMOL/L (ref 22–29)
CREAT SERPL-MCNC: 0.71 MG/DL (ref 0.57–1)
GLOBULIN SER CALC-MCNC: 2 GM/DL
GLUCOSE SERPL-MCNC: 114 MG/DL (ref 65–99)
HBA1C MFR BLD: 7.4 % (ref 4.8–5.6)
HDLC SERPL-MCNC: 38 MG/DL (ref 40–60)
IMP & REVIEW OF LAB RESULTS: NORMAL
LDLC SERPL CALC-MCNC: 97 MG/DL (ref 0–100)
POTASSIUM SERPL-SCNC: 4.8 MMOL/L (ref 3.5–5.2)
PROT SERPL-MCNC: 6.8 G/DL (ref 6–8.5)
SODIUM SERPL-SCNC: 140 MMOL/L (ref 136–145)
T4 FREE SERPL-MCNC: 1.35 NG/DL (ref 0.93–1.7)
TRIGL SERPL-MCNC: 189 MG/DL (ref 0–150)
TSH SERPL DL<=0.005 MIU/L-ACNC: 1.69 UIU/ML (ref 0.27–4.2)
VLDLC SERPL CALC-MCNC: 33 MG/DL (ref 5–40)

## 2021-05-24 PROCEDURE — 99214 OFFICE O/P EST MOD 30 MIN: CPT | Performed by: INTERNAL MEDICINE

## 2021-06-22 RX ORDER — DULAGLUTIDE 1.5 MG/.5ML
1.5 INJECTION, SOLUTION SUBCUTANEOUS
Qty: 2 ML | Refills: 6 | Status: SHIPPED | OUTPATIENT
Start: 2021-06-22 | End: 2022-01-24

## 2021-09-01 RX ORDER — GLIMEPIRIDE 4 MG/1
TABLET ORAL
Qty: 135 TABLET | Refills: 2 | Status: CANCELLED | OUTPATIENT
Start: 2021-09-01

## 2021-09-02 RX ORDER — GLIMEPIRIDE 4 MG/1
TABLET ORAL
Qty: 135 TABLET | Refills: 1 | Status: SHIPPED | OUTPATIENT
Start: 2021-09-02 | End: 2022-01-24 | Stop reason: SDUPTHER

## 2021-10-19 RX ORDER — DAPAGLIFLOZIN 10 MG/1
10 TABLET, FILM COATED ORAL DAILY
Qty: 30 TABLET | Refills: 5 | Status: SHIPPED | OUTPATIENT
Start: 2021-10-19 | End: 2022-01-24

## 2021-11-08 RX ORDER — IRBESARTAN 300 MG/1
TABLET ORAL
Qty: 45 TABLET | Refills: 1 | Status: SHIPPED | OUTPATIENT
Start: 2021-11-08 | End: 2022-01-24

## 2021-11-08 RX ORDER — IRBESARTAN 300 MG/1
TABLET ORAL
Qty: 45 TABLET | Refills: 2 | Status: CANCELLED | OUTPATIENT
Start: 2021-11-08

## 2021-11-10 DIAGNOSIS — E78.5 HYPERLIPIDEMIA, UNSPECIFIED HYPERLIPIDEMIA TYPE: ICD-10-CM

## 2021-11-11 RX ORDER — ATORVASTATIN CALCIUM 40 MG/1
40 TABLET, FILM COATED ORAL DAILY
Qty: 90 TABLET | Refills: 1 | OUTPATIENT
Start: 2021-11-11

## 2021-11-15 DIAGNOSIS — E78.5 HYPERLIPIDEMIA, UNSPECIFIED HYPERLIPIDEMIA TYPE: ICD-10-CM

## 2021-11-16 RX ORDER — ATORVASTATIN CALCIUM 40 MG/1
40 TABLET, FILM COATED ORAL DAILY
Qty: 90 TABLET | Refills: 1 | Status: SHIPPED | OUTPATIENT
Start: 2021-11-16 | End: 2022-01-30

## 2022-01-03 DIAGNOSIS — Z79.4 TYPE 2 DIABETES MELLITUS WITHOUT COMPLICATION, WITH LONG-TERM CURRENT USE OF INSULIN: ICD-10-CM

## 2022-01-03 DIAGNOSIS — E11.9 TYPE 2 DIABETES MELLITUS WITHOUT COMPLICATION, WITH LONG-TERM CURRENT USE OF INSULIN: ICD-10-CM

## 2022-01-03 RX ORDER — METFORMIN HYDROCHLORIDE 500 MG/1
TABLET, EXTENDED RELEASE ORAL
Qty: 360 TABLET | Refills: 2 | Status: CANCELLED | OUTPATIENT
Start: 2022-01-03

## 2022-01-04 ENCOUNTER — TELEPHONE (OUTPATIENT)
Dept: ENDOCRINOLOGY | Age: 62
End: 2022-01-04

## 2022-01-04 RX ORDER — METFORMIN HYDROCHLORIDE 500 MG/1
TABLET, EXTENDED RELEASE ORAL
Qty: 120 TABLET | Refills: 0 | Status: SHIPPED | OUTPATIENT
Start: 2022-01-04 | End: 2022-02-07 | Stop reason: SDUPTHER

## 2022-01-04 NOTE — TELEPHONE ENCOUNTER
Looking at the patient chart and medication list this medication was sent in today 1/4/2022 and authorized by brendon haas

## 2022-01-04 NOTE — TELEPHONE ENCOUNTER
Pt called and stated shes out of metformin..pharmacy stated they faxed over a request but they havent heard from us.the patient stated shes almost out..plz fill out form from pharmacy..VRR

## 2022-01-24 ENCOUNTER — OFFICE VISIT (OUTPATIENT)
Dept: ENDOCRINOLOGY | Age: 62
End: 2022-01-24

## 2022-01-24 VITALS
HEIGHT: 64 IN | DIASTOLIC BLOOD PRESSURE: 80 MMHG | OXYGEN SATURATION: 98 % | WEIGHT: 138.4 LBS | BODY MASS INDEX: 23.63 KG/M2 | SYSTOLIC BLOOD PRESSURE: 132 MMHG | HEART RATE: 85 BPM

## 2022-01-24 DIAGNOSIS — E11.9 TYPE 2 DIABETES MELLITUS WITHOUT COMPLICATION, WITH LONG-TERM CURRENT USE OF INSULIN: Primary | ICD-10-CM

## 2022-01-24 DIAGNOSIS — Z79.4 TYPE 2 DIABETES MELLITUS WITHOUT COMPLICATION, WITH LONG-TERM CURRENT USE OF INSULIN: Primary | ICD-10-CM

## 2022-01-24 DIAGNOSIS — E78.5 HYPERLIPIDEMIA, UNSPECIFIED HYPERLIPIDEMIA TYPE: ICD-10-CM

## 2022-01-24 DIAGNOSIS — I10 ESSENTIAL HYPERTENSION: ICD-10-CM

## 2022-01-24 DIAGNOSIS — M85.89 OSTEOPENIA OF MULTIPLE SITES: ICD-10-CM

## 2022-01-24 DIAGNOSIS — E55.9 VITAMIN D DEFICIENCY: ICD-10-CM

## 2022-01-24 PROCEDURE — 99214 OFFICE O/P EST MOD 30 MIN: CPT | Performed by: INTERNAL MEDICINE

## 2022-01-24 RX ORDER — LANOLIN ALCOHOL/MO/W.PET/CERES
1000 CREAM (GRAM) TOPICAL DAILY
COMMUNITY

## 2022-01-24 RX ORDER — DULAGLUTIDE 1.5 MG/.5ML
1.5 INJECTION, SOLUTION SUBCUTANEOUS
Qty: 6 ML | Refills: 2 | Status: SHIPPED | OUTPATIENT
Start: 2022-01-24 | End: 2022-01-30 | Stop reason: DRUGHIGH

## 2022-01-24 RX ORDER — IRBESARTAN 150 MG/1
150 TABLET ORAL DAILY
Qty: 90 TABLET | Refills: 2 | Status: SHIPPED | OUTPATIENT
Start: 2022-01-24 | End: 2022-10-13 | Stop reason: SDUPTHER

## 2022-01-24 RX ORDER — GLIMEPIRIDE 4 MG/1
4 TABLET ORAL 2 TIMES DAILY
Qty: 135 TABLET | Refills: 2 | Status: SHIPPED | OUTPATIENT
Start: 2022-01-24 | End: 2022-07-25 | Stop reason: SDUPTHER

## 2022-01-24 RX ORDER — DAPAGLIFLOZIN 10 MG/1
10 TABLET, FILM COATED ORAL DAILY
Qty: 90 TABLET | Refills: 1 | Status: SHIPPED | OUTPATIENT
Start: 2022-01-24 | End: 2022-04-10 | Stop reason: SDUPTHER

## 2022-01-24 NOTE — PROGRESS NOTES
"Ewelina Yost is a 61 y.o. female.       F/u for dm 2, hyperlipidemia / testing bs 1-2 x day / last dm eye exam aug 2021with dr Brown  / last dm foot exam today with dr Douglas / flu vaccine @ [facility]        Patient is 61-year-old female who came in for follow-up.     She has type 2 diabetes mellitus and is on glimepiride 4 mg twice daily, Trulicity 1.5 mg, metformin ER, and Farxiga.    Fasting glucose .  Her last meal was last night.     Her last eye examination was in Aug 2021.  She has no retinopathy.  Urine microalbumin is normal in July 2020.  She denies numbness, tingling or burning in her hands or feet.     She has hyperlipidemia and is on Lipitor 40 mg/day.  She denies myalgia.     She has hypertension and is on irbesartan 300 mg once a day.  She denies chest pain or shortness of breath.     She has osteopenia on bone density done in July 2019.  Her 10-year risk for major osteoporotic fracture is seven-point percent and for hip fracture is 0.5%.  She has vitamin D deficiency and is on vitamin D3 2000 units/day.    Her mother has polymyalgia rheumatica and kidney failure.     The following portions of the patient's history were reviewed and updated as appropriate: allergies, current medications, past family history, past medical history, past social history, past surgical history and problem list.    Review of Systems   Respiratory: Negative for shortness of breath.    Cardiovascular: Negative for chest pain and palpitations.   Gastrointestinal: Negative.    Endocrine: Negative for cold intolerance and heat intolerance.   Genitourinary: Negative.    Musculoskeletal: Negative for myalgias.   Neurological: Negative for numbness.     Objective      Vitals:    01/24/22 0906   BP: 132/80   Pulse: 85   SpO2: 98%   Weight: 62.8 kg (138 lb 6.4 oz)   Height: 162.6 cm (64.02\")     Physical Exam  Office Visit on 05/24/2021   Component Date Value Ref Range Status   • Glucose 05/24/2021 114* 65 - 99 mg/dL " Final   • BUN 05/24/2021 10  8 - 23 mg/dL Final   • Creatinine 05/24/2021 0.71  0.57 - 1.00 mg/dL Final   • eGFR Non  Am 05/24/2021 83  >60 mL/min/1.73 Final    Comment: GFR Normal >60  Chronic Kidney Disease <60  Kidney Failure <15     • eGFR  Am 05/24/2021 101  >60 mL/min/1.73 Final   • BUN/Creatinine Ratio 05/24/2021 14.1  7.0 - 25.0 Final   • Sodium 05/24/2021 140  136 - 145 mmol/L Final   • Potassium 05/24/2021 4.8  3.5 - 5.2 mmol/L Final   • Chloride 05/24/2021 101  98 - 107 mmol/L Final   • Total CO2 05/24/2021 27.9  22.0 - 29.0 mmol/L Final   • Calcium 05/24/2021 10.3  8.6 - 10.5 mg/dL Final   • Total Protein 05/24/2021 6.8  6.0 - 8.5 g/dL Final   • Albumin 05/24/2021 4.80  3.50 - 5.20 g/dL Final   • Globulin 05/24/2021 2.0  gm/dL Final   • A/G Ratio 05/24/2021 2.4  g/dL Final   • Total Bilirubin 05/24/2021 0.4  0.0 - 1.2 mg/dL Final   • Alkaline Phosphatase 05/24/2021 97  39 - 117 U/L Final   • AST (SGOT) 05/24/2021 11  1 - 32 U/L Final   • ALT (SGPT) 05/24/2021 12  1 - 33 U/L Final   • Total Cholesterol 05/24/2021 168  0 - 200 mg/dL Final    Comment: Cholesterol Reference Ranges  (U.S. Department of Health and Human Services ATP III  Classifications)  Desirable          <200 mg/dL  Borderline High    200-239 mg/dL  High Risk          >240 mg/dL  Triglyceride Reference Ranges  (U.S. Department of Health and Human Services ATP III  Classifications)  Normal           <150 mg/dL  Borderline High  150-199 mg/dL  High             200-499 mg/dL  Very High        >500 mg/dL  HDL Reference Ranges  (U.S. Department of Health and Human Services ATP III  Classifcations)  Low     <40 mg/dl (major risk factor for CHD)  High    >60 mg/dl ('negative' risk factor for CHD)  LDL Reference Ranges  (U.S. Department of Health and Human Services ATP III  Classifcations)  Optimal          <100 mg/dL  Near Optimal     100-129 mg/dL  Borderline High  130-159 mg/dL  High             160-189 mg/dL  Very High        >189  mg/dL     • Triglycerides 05/24/2021 189* 0 - 150 mg/dL Final   • HDL Cholesterol 05/24/2021 38* 40 - 60 mg/dL Final   • VLDL Cholesterol Caleb 05/24/2021 33  5 - 40 mg/dL Final   • LDL Chol Calc ([facility]) 05/24/2021 97  0 - 100 mg/dL Final   • Hemoglobin A1C 05/24/2021 7.40* 4.80 - 5.60 % Final    Comment: Hemoglobin A1C Ranges:  Increased Risk for Diabetes  5.7% to 6.4%  Diabetes                     >= 6.5%  Diabetic Goal                < 7.0%     • TSH 05/24/2021 1.690  0.270 - 4.200 uIU/mL Final   • Free T4 05/24/2021 1.35  0.93 - 1.70 ng/dL Final    Results may be falsely increased if patient taking Biotin.   • 25 Hydroxy, Vitamin D 05/24/2021 60.1  30.0 - 100.0 ng/ml Final    Comment: Results may be falsely increased if patient taking Biotin.  Reference Range for Total Vitamin D 25(OH)  Deficiency <20.0 ng/mL  Insufficiency 21-29 ng/mL  Sufficiency  ng/mL  Toxicity >100 ng/ml     • Interpretation 05/24/2021 Note   Final    Supplemental report is available.     Assessment/Plan   Diagnoses and all orders for this visit:    1. Type 2 diabetes mellitus without complication, with long-term current use of insulin (HCC) (Primary)  -     Microalbumin / Creatinine Urine Ratio - Urine, Clean Catch  -     Comprehensive Metabolic Panel  -     Hemoglobin A1c  -     Vitamin B12  -     DEXA Bone Density Axial    2. Hyperlipidemia, unspecified hyperlipidemia type  -     Lipid Panel    3. Essential hypertension    4. Vitamin D deficiency    5. Osteopenia of multiple sites  -     Vitamin D 25 Hydroxy    Other orders  -     Dapagliflozin Propanediol (Farxiga) 10 MG tablet; Take 10 mg by mouth Daily.  Dispense: 90 tablet; Refill: 1  -     glimepiride (AMARYL) 4 MG tablet; 1 tablet twice daily  Dispense: 135 tablet; Refill: 2  -     irbesartan (AVAPRO) 150 MG tablet; 1 tablet daily  Dispense: 90 tablet; Refill: 2  -     Dulaglutide (Trulicity) 1.5 MG/0.5ML solution pen-injector; Inject 1.5 mg under the skin into the appropriate  area as directed Every 7 (Seven) Days.  Dispense: 12 pen; Refill: 2      Continue glimepiride, Trulicity, Metformin ER and Farxiga.  Continue Lipitor 40 mg/day.  Continue irbesartan 150 mg/day.  Continue vitamin D3 2000 units/day.  Schedule follow-up bone density.    Copy my note sent to Nicki Rogers NP.    RTC 4 mos.

## 2022-01-25 LAB
25(OH)D3+25(OH)D2 SERPL-MCNC: 34.2 NG/ML (ref 30–100)
ALBUMIN SERPL-MCNC: 4.4 G/DL (ref 3.8–4.8)
ALBUMIN/CREAT UR: <9 MG/G CREAT (ref 0–29)
ALBUMIN/GLOB SERPL: 2 {RATIO} (ref 1.2–2.2)
ALP SERPL-CCNC: 109 IU/L (ref 44–121)
ALT SERPL-CCNC: 13 IU/L (ref 0–32)
AST SERPL-CCNC: 15 IU/L (ref 0–40)
BILIRUB SERPL-MCNC: 0.5 MG/DL (ref 0–1.2)
BUN SERPL-MCNC: 11 MG/DL (ref 8–27)
BUN/CREAT SERPL: 17 (ref 12–28)
CALCIUM SERPL-MCNC: 9.8 MG/DL (ref 8.7–10.3)
CHLORIDE SERPL-SCNC: 101 MMOL/L (ref 96–106)
CHOLEST SERPL-MCNC: 198 MG/DL (ref 100–199)
CO2 SERPL-SCNC: 23 MMOL/L (ref 20–29)
CREAT SERPL-MCNC: 0.66 MG/DL (ref 0.57–1)
CREAT UR-MCNC: 32.3 MG/DL
GLOBULIN SER CALC-MCNC: 2.2 G/DL (ref 1.5–4.5)
GLUCOSE SERPL-MCNC: 102 MG/DL (ref 65–99)
HBA1C MFR BLD: 7.5 % (ref 4.8–5.6)
HDLC SERPL-MCNC: 44 MG/DL
IMP & REVIEW OF LAB RESULTS: NORMAL
LDLC SERPL CALC-MCNC: 124 MG/DL (ref 0–99)
MICROALBUMIN UR-MCNC: <3 UG/ML
POTASSIUM SERPL-SCNC: 4.5 MMOL/L (ref 3.5–5.2)
PROT SERPL-MCNC: 6.6 G/DL (ref 6–8.5)
SODIUM SERPL-SCNC: 140 MMOL/L (ref 134–144)
TRIGL SERPL-MCNC: 166 MG/DL (ref 0–149)
VIT B12 SERPL-MCNC: 936 PG/ML (ref 232–1245)
VLDLC SERPL CALC-MCNC: 30 MG/DL (ref 5–40)

## 2022-01-30 DIAGNOSIS — E78.5 HYPERLIPIDEMIA, UNSPECIFIED HYPERLIPIDEMIA TYPE: ICD-10-CM

## 2022-01-30 RX ORDER — ATORVASTATIN CALCIUM 80 MG/1
80 TABLET, FILM COATED ORAL DAILY
Qty: 90 TABLET | Refills: 1 | Status: SHIPPED | OUTPATIENT
Start: 2022-01-30 | End: 2022-07-25 | Stop reason: SDUPTHER

## 2022-01-30 RX ORDER — DULAGLUTIDE 3 MG/.5ML
3 INJECTION, SOLUTION SUBCUTANEOUS WEEKLY
Qty: 12 ML | Refills: 1 | Status: SHIPPED | OUTPATIENT
Start: 2022-01-30 | End: 2022-07-26 | Stop reason: DRUGHIGH

## 2022-01-30 NOTE — PROGRESS NOTES
Urine microalbumin normal.  Continue irbesartan.Hemoglobin A1c slightly elevated at 7.5%.  Increase Trulicity to 3 mg weekly.  Prescriptions sent to [facility].LDL higher at 124.  HDL 44.  Increase Lipitor to 80 mg/day.Normal vitamin D.  Continue vitamin D3 2000 units/day.Normal vitamin B12.Copy of labs sent to Nicki Rogers NP.Please notify patient of results and instructions.

## 2022-01-30 NOTE — PROGRESS NOTES
Urine microalbumin normal.  Continue irbesartan.Hemoglobin A1c slightly elevated at 7.5%.  Increase Trulicity to 3 mg weekly.  Prescriptions sent to [facility].LDL higher at 124.  HDL 44.  Increase Lipitor to 80 mg/day.Normal vitamin D.  Continue vitamin D3 2000 units/day.Normal vitamin B12.Copy of labs sent to Nicki Rogers NP.Please notify patient of results and instructions.

## 2022-02-02 DIAGNOSIS — Z79.4 TYPE 2 DIABETES MELLITUS WITHOUT COMPLICATION, WITH LONG-TERM CURRENT USE OF INSULIN: ICD-10-CM

## 2022-02-02 DIAGNOSIS — E11.9 TYPE 2 DIABETES MELLITUS WITHOUT COMPLICATION, WITH LONG-TERM CURRENT USE OF INSULIN: ICD-10-CM

## 2022-02-02 RX ORDER — METFORMIN HYDROCHLORIDE 500 MG/1
TABLET, EXTENDED RELEASE ORAL
Qty: 360 TABLET | Refills: 0 | Status: CANCELLED | OUTPATIENT
Start: 2022-02-02

## 2022-02-06 DIAGNOSIS — E11.9 TYPE 2 DIABETES MELLITUS WITHOUT COMPLICATION, WITH LONG-TERM CURRENT USE OF INSULIN: ICD-10-CM

## 2022-02-06 DIAGNOSIS — Z79.4 TYPE 2 DIABETES MELLITUS WITHOUT COMPLICATION, WITH LONG-TERM CURRENT USE OF INSULIN: ICD-10-CM

## 2022-02-06 RX ORDER — METFORMIN HYDROCHLORIDE 500 MG/1
TABLET, EXTENDED RELEASE ORAL
Qty: 360 TABLET | Refills: 0 | Status: CANCELLED | OUTPATIENT
Start: 2022-02-02

## 2022-02-07 ENCOUNTER — TELEPHONE (OUTPATIENT)
Dept: ENDOCRINOLOGY | Age: 62
End: 2022-02-07

## 2022-02-07 DIAGNOSIS — Z79.4 TYPE 2 DIABETES MELLITUS WITHOUT COMPLICATION, WITH LONG-TERM CURRENT USE OF INSULIN: ICD-10-CM

## 2022-02-07 DIAGNOSIS — E11.9 TYPE 2 DIABETES MELLITUS WITHOUT COMPLICATION, WITH LONG-TERM CURRENT USE OF INSULIN: ICD-10-CM

## 2022-02-07 RX ORDER — METFORMIN HYDROCHLORIDE 500 MG/1
1000 TABLET, EXTENDED RELEASE ORAL 2 TIMES DAILY WITH MEALS
Qty: 120 TABLET | Refills: 0 | Status: SHIPPED | OUTPATIENT
Start: 2022-02-07 | End: 2022-02-08 | Stop reason: SDUPTHER

## 2022-02-07 NOTE — TELEPHONE ENCOUNTER
Patient called needs a refill on Metformin 500 ER. Please send 3 month supply to   [facility].

## 2022-02-08 DIAGNOSIS — Z79.4 TYPE 2 DIABETES MELLITUS WITHOUT COMPLICATION, WITH LONG-TERM CURRENT USE OF INSULIN: ICD-10-CM

## 2022-02-08 DIAGNOSIS — E11.9 TYPE 2 DIABETES MELLITUS WITHOUT COMPLICATION, WITH LONG-TERM CURRENT USE OF INSULIN: ICD-10-CM

## 2022-02-08 RX ORDER — METFORMIN HYDROCHLORIDE 500 MG/1
1000 TABLET, EXTENDED RELEASE ORAL 2 TIMES DAILY WITH MEALS
Qty: 360 TABLET | Refills: 1 | Status: SHIPPED | OUTPATIENT
Start: 2022-02-08 | End: 2022-07-25 | Stop reason: SDUPTHER

## 2022-02-14 RX ORDER — AMLODIPINE BESYLATE 2.5 MG/1
2.5 TABLET ORAL DAILY
Qty: 90 TABLET | Refills: 5 | Status: CANCELLED | OUTPATIENT
Start: 2022-02-14 | End: 2023-02-14

## 2022-02-15 RX ORDER — AMLODIPINE BESYLATE 2.5 MG/1
2.5 TABLET ORAL DAILY
Qty: 90 TABLET | Refills: 5 | Status: CANCELLED | OUTPATIENT
Start: 2022-02-14 | End: 2023-02-14

## 2022-02-16 ENCOUNTER — TELEPHONE (OUTPATIENT)
Dept: ENDOCRINOLOGY | Age: 62
End: 2022-02-16

## 2022-02-16 NOTE — TELEPHONE ENCOUNTER
Pt Is calling back to check status on refill for Amlodipine 2.5 mg she Is down to her last pill    Please send to [facility]

## 2022-02-17 RX ORDER — AMLODIPINE BESYLATE 2.5 MG/1
2.5 TABLET ORAL DAILY
Qty: 90 TABLET | Refills: 5 | Status: CANCELLED | OUTPATIENT
Start: 2022-02-14 | End: 2023-02-14

## 2022-02-17 RX ORDER — AMLODIPINE BESYLATE 2.5 MG/1
2.5 TABLET ORAL DAILY
Qty: 90 TABLET | Refills: 5 | Status: CANCELLED | OUTPATIENT
Start: 2022-02-17 | End: 2023-02-17

## 2022-02-19 RX ORDER — AMLODIPINE BESYLATE 2.5 MG/1
2.5 TABLET ORAL DAILY
Qty: 90 TABLET | Refills: 1 | Status: SHIPPED | OUTPATIENT
Start: 2022-02-19 | End: 2022-07-25 | Stop reason: SDUPTHER

## 2022-04-11 RX ORDER — DAPAGLIFLOZIN 10 MG/1
10 TABLET, FILM COATED ORAL DAILY
Qty: 90 TABLET | Refills: 1 | Status: SHIPPED | OUTPATIENT
Start: 2022-04-11 | End: 2022-07-25 | Stop reason: SDUPTHER

## 2022-07-20 NOTE — PROGRESS NOTES
"Ewelina Yost is a 62 y.o. female.     F/u for dm 2, hyperlipidemia / testing bs 2 x day / last dm eye exam 7/27/20 with dr Brown  / last dm foot exam 1/24/22 with dr Douglas         Patient is 62-year-old female who came in for follow-up.     She has type 2 diabetes mellitus and is on glimepiride 4 mg twice daily, Trulicity 3 mg, metformin ER, and Farxiga.    Fasting glucose .  Suppertime glucose .  She has lost 5 pounds since January 2022.  Her last meal was last night.     Her last eye examination was in Aug 2021.  She has no retinopathy.  Urine microalbumin is normal in 1/22.  She denies numbness, tingling or burning in her hands or feet.     She has hyperlipidemia and is on Lipitor 80 mg/day.  She denies myalgia.     She has hypertension and is on irbesartan 150 mg once a day and amlodipine 2.5 mg/day.  She denies chest pain or shortness of breath.     She has osteopenia on bone density done in July 2019. She has vitamin D deficiency and is on vitamin D3 2000 units/day.     Her mother has polymyalgia rheumatica and kidney failure.      The following portions of the patient's history were reviewed and updated as appropriate: allergies, current medications, past family history, past medical history, past social history, past surgical history and problem list.    Review of Systems   HENT: Negative.    Eyes: Negative for visual disturbance.   Respiratory: Negative for shortness of breath.    Cardiovascular: Negative for chest pain and palpitations.   Gastrointestinal: Negative.    Endocrine: Negative for cold intolerance and heat intolerance.   Genitourinary: Negative.    Musculoskeletal: Negative for myalgias.   Neurological: Negative for seizures.     Vitals:    07/25/22 0906   BP: 124/70   Pulse: 94   Temp: 98.4 °F (36.9 °C)   SpO2: 99%   Weight: 60.5 kg (133 lb 6.4 oz)   Height: 162.6 cm (64.02\")      Objective   Physical Exam  Constitutional:       General: She is not in acute distress.     " Appearance: Normal appearance. She is not ill-appearing, toxic-appearing or diaphoretic.   Eyes:      General: No scleral icterus.        Right eye: No discharge.         Left eye: No discharge.      Extraocular Movements: Extraocular movements intact.      Conjunctiva/sclera: Conjunctivae normal.   Cardiovascular:      Rate and Rhythm: Normal rate and regular rhythm.      Heart sounds: Normal heart sounds. No murmur heard.    No friction rub. No gallop.   Pulmonary:      Effort: No respiratory distress.      Breath sounds: Normal breath sounds. No stridor. No rales.   Chest:      Chest wall: No tenderness.   Abdominal:      General: Bowel sounds are normal. There is no distension.      Palpations: Abdomen is soft. There is no mass.      Tenderness: There is no right CVA tenderness or left CVA tenderness.   Musculoskeletal:         General: Normal range of motion.      Cervical back: Normal range of motion.      Right lower leg: No edema.      Left lower leg: No edema.   Skin:     General: Skin is warm and dry.   Neurological:      General: No focal deficit present.      Mental Status: She is alert and oriented to person, place, and time.   Psychiatric:         Mood and Affect: Mood normal.         Behavior: Behavior normal.       Office Visit on 01/24/2022   Component Date Value Ref Range Status   • Creatinine, Urine 01/24/2022 32.3  Not Estab. mg/dL Final   • Microalbumin, Urine 01/24/2022 <3.0  Not Estab. ug/mL Final    **Verified by repeat analysis**   • Microalbumin/Creatinine Ratio 01/24/2022 <9  0 - 29 mg/g creat Final    Comment:                        Normal:                0 -  29                         Moderately increased: 30 - 300                         Severely increased:       >300     • Glucose 01/24/2022 102 (A) 65 - 99 mg/dL Final   • BUN 01/24/2022 11  8 - 27 mg/dL Final   • Creatinine 01/24/2022 0.66  0.57 - 1.00 mg/dL Final   • eGFR Non  Am 01/24/2022 95  >59 mL/min/1.73 Final   • eGFR   Am 01/24/2022 109  >59 mL/min/1.73 Final    Comment: **In accordance with recommendations from the NKF-ASN Task force,**    [facility] is in the process of updating its eGFR calculation to the    2021 CKD-EPI creatinine equation that estimates kidney function    without a race variable.     • BUN/Creatinine Ratio 01/24/2022 17  12 - 28 Final   • Sodium 01/24/2022 140  134 - 144 mmol/L Final   • Potassium 01/24/2022 4.5  3.5 - 5.2 mmol/L Final   • Chloride 01/24/2022 101  96 - 106 mmol/L Final   • Total CO2 01/24/2022 23  20 - 29 mmol/L Final   • Calcium 01/24/2022 9.8  8.7 - 10.3 mg/dL Final   • Total Protein 01/24/2022 6.6  6.0 - 8.5 g/dL Final   • Albumin 01/24/2022 4.4  3.8 - 4.8 g/dL Final   • Globulin 01/24/2022 2.2  1.5 - 4.5 g/dL Final   • A/G Ratio 01/24/2022 2.0  1.2 - 2.2 Final   • Total Bilirubin 01/24/2022 0.5  0.0 - 1.2 mg/dL Final   • Alkaline Phosphatase 01/24/2022 109  44 - 121 IU/L Final   • AST (SGOT) 01/24/2022 15  0 - 40 IU/L Final   • ALT (SGPT) 01/24/2022 13  0 - 32 IU/L Final   • Hemoglobin A1C 01/24/2022 7.5 (A) 4.8 - 5.6 % Final    Comment:          Prediabetes: 5.7 - 6.4           Diabetes: >6.4           Glycemic control for adults with diabetes: <7.0     • Total Cholesterol 01/24/2022 198  100 - 199 mg/dL Final   • Triglycerides 01/24/2022 166 (A) 0 - 149 mg/dL Final   • HDL Cholesterol 01/24/2022 44  >39 mg/dL Final   • VLDL Cholesterol Caleb 01/24/2022 30  5 - 40 mg/dL Final   • LDL Chol Calc (NIH) 01/24/2022 124 (A) 0 - 99 mg/dL Final   • 25 Hydroxy, Vitamin D 01/24/2022 34.2  30.0 - 100.0 ng/mL Final    Comment: Vitamin D deficiency has been defined by the [place] of  [employer]icine and an Endocrine Society practice guideline as a  level of serum 25-OH vitamin D less than 20 ng/mL (1,2).  The Endocrine Society went on to further define vitamin D  insufficiency as a level between 21 and 29 ng/mL (2).  1. IOM ([place] [employer]). 2010. Dietary reference     intakes for calcium and D.  [place]: The     National Academies Press.  2. Lamonte MF, Rossy NC, Sierra POWELL, et al.     Evaluation, treatment, and prevention of vitamin D     deficiency: an Endocrine Society clinical practice     guideline. JCEM. 2011 Jul; 96(7):1911-30.     • Vitamin B-12 01/24/2022 936  232 - 1,245 pg/mL Final   • Interpretation 01/24/2022 Note   Final    Supplemental report is available.     Assessment & Plan   Diagnoses and all orders for this visit:    1. Type 2 diabetes mellitus without complication, with long-term current use of insulin (HCC) (Primary)  -     Comprehensive Metabolic Panel  -     Lipid Panel  -     Hemoglobin A1c  -     TSH  -     T4, Free    2. Essential hypertension    3. Hyperlipidemia, unspecified hyperlipidemia type  -     Comprehensive Metabolic Panel  -     Lipid Panel    4. Osteopenia of multiple sites  -     Vitamin D 25 Hydroxy    5. Vitamin D deficiency  -     Vitamin D 25 Hydroxy      Continue glimepiride 4 mg to 1 tablet every morning and 1 tablet every evening.  Continue Trulicity 3 mg weekly.  Continue metformin   mg 2 tablets twice a day and Farxiga 10 mg/day.  Continue irbesartan 150 mg/day and amlodipine 2.5 mg/day..  Continue Lipitor 80 mg/day.  Continue vitamin D3 2000 units/day.    Copy of my note sent to Nicki Rogers NP.    RTC 4 mos.          Normal superficial inspection and palpation of back and vertebral bodies.

## 2022-07-25 ENCOUNTER — OFFICE VISIT (OUTPATIENT)
Dept: ENDOCRINOLOGY | Age: 62
End: 2022-07-25

## 2022-07-25 VITALS
HEIGHT: 64 IN | BODY MASS INDEX: 22.77 KG/M2 | WEIGHT: 133.4 LBS | TEMPERATURE: 98.4 F | OXYGEN SATURATION: 99 % | DIASTOLIC BLOOD PRESSURE: 70 MMHG | SYSTOLIC BLOOD PRESSURE: 124 MMHG | HEART RATE: 94 BPM

## 2022-07-25 DIAGNOSIS — E55.9 VITAMIN D DEFICIENCY: ICD-10-CM

## 2022-07-25 DIAGNOSIS — I10 ESSENTIAL HYPERTENSION: ICD-10-CM

## 2022-07-25 DIAGNOSIS — E78.5 HYPERLIPIDEMIA, UNSPECIFIED HYPERLIPIDEMIA TYPE: ICD-10-CM

## 2022-07-25 DIAGNOSIS — M85.89 OSTEOPENIA OF MULTIPLE SITES: ICD-10-CM

## 2022-07-25 DIAGNOSIS — Z79.4 TYPE 2 DIABETES MELLITUS WITHOUT COMPLICATION, WITH LONG-TERM CURRENT USE OF INSULIN: Primary | ICD-10-CM

## 2022-07-25 DIAGNOSIS — E11.9 TYPE 2 DIABETES MELLITUS WITHOUT COMPLICATION, WITH LONG-TERM CURRENT USE OF INSULIN: Primary | ICD-10-CM

## 2022-07-25 PROCEDURE — 99214 OFFICE O/P EST MOD 30 MIN: CPT | Performed by: INTERNAL MEDICINE

## 2022-07-25 RX ORDER — GLIMEPIRIDE 4 MG/1
4 TABLET ORAL 2 TIMES DAILY
Qty: 135 TABLET | Refills: 2 | Status: SHIPPED | OUTPATIENT
Start: 2022-07-25 | End: 2022-07-25

## 2022-07-25 RX ORDER — DAPAGLIFLOZIN 10 MG/1
10 TABLET, FILM COATED ORAL DAILY
Qty: 90 TABLET | Refills: 1 | Status: SHIPPED | OUTPATIENT
Start: 2022-07-25

## 2022-07-25 RX ORDER — ATORVASTATIN CALCIUM 80 MG/1
80 TABLET, FILM COATED ORAL DAILY
Qty: 90 TABLET | Refills: 1 | Status: SHIPPED | OUTPATIENT
Start: 2022-07-25 | End: 2023-01-11 | Stop reason: SDUPTHER

## 2022-07-25 RX ORDER — GLIMEPIRIDE 4 MG/1
4 TABLET ORAL 2 TIMES DAILY
Qty: 180 TABLET | Refills: 2 | Status: SHIPPED | OUTPATIENT
Start: 2022-07-25

## 2022-07-25 RX ORDER — METFORMIN HYDROCHLORIDE 500 MG/1
1000 TABLET, EXTENDED RELEASE ORAL 2 TIMES DAILY WITH MEALS
Qty: 360 TABLET | Refills: 1 | Status: SHIPPED | OUTPATIENT
Start: 2022-07-25 | End: 2023-02-26 | Stop reason: SDUPTHER

## 2022-07-25 RX ORDER — AMLODIPINE BESYLATE 2.5 MG/1
2.5 TABLET ORAL DAILY
Qty: 90 TABLET | Refills: 1 | Status: SHIPPED | OUTPATIENT
Start: 2022-07-25 | End: 2023-01-11 | Stop reason: SDUPTHER

## 2022-07-26 ENCOUNTER — TELEPHONE (OUTPATIENT)
Dept: ENDOCRINOLOGY | Age: 62
End: 2022-07-26

## 2022-07-26 LAB
25(OH)D3+25(OH)D2 SERPL-MCNC: 52.1 NG/ML (ref 30–100)
ALBUMIN SERPL-MCNC: 4.5 G/DL (ref 3.8–4.8)
ALBUMIN/GLOB SERPL: 2.1 {RATIO} (ref 1.2–2.2)
ALP SERPL-CCNC: 136 IU/L (ref 44–121)
ALT SERPL-CCNC: 11 IU/L (ref 0–32)
AST SERPL-CCNC: 12 IU/L (ref 0–40)
BILIRUB SERPL-MCNC: 0.3 MG/DL (ref 0–1.2)
BUN SERPL-MCNC: 12 MG/DL (ref 8–27)
BUN/CREAT SERPL: 17 (ref 12–28)
CALCIUM SERPL-MCNC: 9.9 MG/DL (ref 8.7–10.3)
CHLORIDE SERPL-SCNC: 101 MMOL/L (ref 96–106)
CHOLEST SERPL-MCNC: 151 MG/DL (ref 100–199)
CO2 SERPL-SCNC: 22 MMOL/L (ref 20–29)
CREAT SERPL-MCNC: 0.7 MG/DL (ref 0.57–1)
EGFRCR SERPLBLD CKD-EPI 2021: 97 ML/MIN/1.73
GLOBULIN SER CALC-MCNC: 2.1 G/DL (ref 1.5–4.5)
GLUCOSE SERPL-MCNC: 114 MG/DL (ref 65–99)
HBA1C MFR BLD: 7 % (ref 4.8–5.6)
HDLC SERPL-MCNC: 38 MG/DL
IMP & REVIEW OF LAB RESULTS: NORMAL
LDLC SERPL CALC-MCNC: 85 MG/DL (ref 0–99)
POTASSIUM SERPL-SCNC: 4.1 MMOL/L (ref 3.5–5.2)
PROT SERPL-MCNC: 6.6 G/DL (ref 6–8.5)
SODIUM SERPL-SCNC: 139 MMOL/L (ref 134–144)
T4 FREE SERPL-MCNC: 1.31 NG/DL (ref 0.82–1.77)
TRIGL SERPL-MCNC: 163 MG/DL (ref 0–149)
TSH SERPL DL<=0.005 MIU/L-ACNC: 1.29 UIU/ML (ref 0.45–4.5)
VLDLC SERPL CALC-MCNC: 28 MG/DL (ref 5–40)

## 2022-07-26 RX ORDER — DULAGLUTIDE 4.5 MG/.5ML
4.5 INJECTION, SOLUTION SUBCUTANEOUS WEEKLY
Qty: 6 ML | Refills: 2 | Status: SHIPPED | OUTPATIENT
Start: 2022-07-26

## 2022-07-26 NOTE — TELEPHONE ENCOUNTER
PT STATES HER FREESTYLE WAS VERY EXPENSIVE THE PHARMACY IS NEEDING A PA OR NEW SCRIPT FOR CONTOUR    PLEASE ADVISE

## 2022-07-26 NOTE — PROGRESS NOTES
LDL improved at 85.  HDL 38.  Continue Lipitor 80 mg/day.  Hemoglobin A1c 7.0%.  Diabetes in better control.  Finish up current supply of Trulicity 3 mg and then increase Trulicity to 4.5 mg weekly.  Prescription sent to pharmacy.  Normal thyroid function tests.    Normal vitamin D.  Continue vitamin D3 2000 units/day.  Copy of labs sent to Nicki Rogers NP.  Patient is self-centering instructions

## 2022-07-27 RX ORDER — BLOOD-GLUCOSE METER
1 EACH MISCELLANEOUS ONCE
Qty: 1 KIT | Refills: 0 | Status: SHIPPED | OUTPATIENT
Start: 2022-07-27 | End: 2022-07-27

## 2022-07-27 RX ORDER — LANCETS 33 GAUGE
EACH MISCELLANEOUS
Qty: 300 EACH | Refills: 4 | Status: SHIPPED | OUTPATIENT
Start: 2022-07-27 | End: 2023-01-30 | Stop reason: SDUPTHER

## 2022-08-08 ENCOUNTER — HOSPITAL ENCOUNTER (OUTPATIENT)
Dept: BONE DENSITY | Facility: HOSPITAL | Age: 62
Discharge: HOME | End: 2022-08-08
Admitting: INTERNAL MEDICINE

## 2022-08-08 PROCEDURE — 77080 DXA BONE DENSITY AXIAL: CPT

## 2022-10-13 RX ORDER — IRBESARTAN 150 MG/1
150 TABLET ORAL DAILY
Qty: 90 TABLET | Refills: 2 | Status: CANCELLED | OUTPATIENT
Start: 2022-10-13

## 2022-10-14 RX ORDER — IRBESARTAN 150 MG/1
150 TABLET ORAL DAILY
Qty: 90 TABLET | Refills: 1 | Status: SHIPPED | OUTPATIENT
Start: 2022-10-14

## 2023-01-11 DIAGNOSIS — E78.5 HYPERLIPIDEMIA, UNSPECIFIED HYPERLIPIDEMIA TYPE: ICD-10-CM

## 2023-01-13 RX ORDER — AMLODIPINE BESYLATE 2.5 MG/1
2.5 TABLET ORAL DAILY
Qty: 90 TABLET | Refills: 1 | Status: SHIPPED | OUTPATIENT
Start: 2023-01-13 | End: 2024-01-13

## 2023-01-13 RX ORDER — ATORVASTATIN CALCIUM 80 MG/1
80 TABLET, FILM COATED ORAL DAILY
Qty: 90 TABLET | Refills: 1 | Status: SHIPPED | OUTPATIENT
Start: 2023-01-13

## 2023-01-25 ENCOUNTER — TELEPHONE (OUTPATIENT)
Dept: ENDOCRINOLOGY | Age: 63
End: 2023-01-25

## 2023-01-29 DIAGNOSIS — E11.9 TYPE 2 DIABETES MELLITUS WITHOUT COMPLICATION, WITH LONG-TERM CURRENT USE OF INSULIN: ICD-10-CM

## 2023-01-29 DIAGNOSIS — E55.9 VITAMIN D DEFICIENCY: ICD-10-CM

## 2023-01-29 DIAGNOSIS — E78.5 HYPERLIPIDEMIA, UNSPECIFIED HYPERLIPIDEMIA TYPE: Primary | ICD-10-CM

## 2023-01-29 DIAGNOSIS — Z79.4 TYPE 2 DIABETES MELLITUS WITHOUT COMPLICATION, WITH LONG-TERM CURRENT USE OF INSULIN: ICD-10-CM

## 2023-01-29 DIAGNOSIS — M85.89 OSTEOPENIA OF MULTIPLE SITES: ICD-10-CM

## 2023-01-30 ENCOUNTER — LAB (OUTPATIENT)
Dept: ENDOCRINOLOGY | Age: 63
End: 2023-01-30
Payer: COMMERCIAL

## 2023-01-30 ENCOUNTER — OFFICE VISIT (OUTPATIENT)
Dept: ENDOCRINOLOGY | Age: 63
End: 2023-01-30
Payer: COMMERCIAL

## 2023-01-30 VITALS
TEMPERATURE: 97.8 F | HEART RATE: 93 BPM | WEIGHT: 130.2 LBS | DIASTOLIC BLOOD PRESSURE: 74 MMHG | HEIGHT: 64 IN | BODY MASS INDEX: 22.23 KG/M2 | OXYGEN SATURATION: 98 % | SYSTOLIC BLOOD PRESSURE: 130 MMHG

## 2023-01-30 DIAGNOSIS — E11.9 TYPE 2 DIABETES MELLITUS WITHOUT COMPLICATION, WITH LONG-TERM CURRENT USE OF INSULIN: ICD-10-CM

## 2023-01-30 DIAGNOSIS — M85.89 OSTEOPENIA OF MULTIPLE SITES: ICD-10-CM

## 2023-01-30 DIAGNOSIS — E55.9 VITAMIN D DEFICIENCY: ICD-10-CM

## 2023-01-30 DIAGNOSIS — Z79.4 TYPE 2 DIABETES MELLITUS WITHOUT COMPLICATION, WITH LONG-TERM CURRENT USE OF INSULIN: Primary | ICD-10-CM

## 2023-01-30 DIAGNOSIS — Z79.4 TYPE 2 DIABETES MELLITUS WITHOUT COMPLICATION, WITH LONG-TERM CURRENT USE OF INSULIN: ICD-10-CM

## 2023-01-30 DIAGNOSIS — I10 ESSENTIAL HYPERTENSION: ICD-10-CM

## 2023-01-30 DIAGNOSIS — E78.5 HYPERLIPIDEMIA, UNSPECIFIED HYPERLIPIDEMIA TYPE: ICD-10-CM

## 2023-01-30 DIAGNOSIS — E11.9 TYPE 2 DIABETES MELLITUS WITHOUT COMPLICATION, WITH LONG-TERM CURRENT USE OF INSULIN: Primary | ICD-10-CM

## 2023-01-30 PROCEDURE — 99214 OFFICE O/P EST MOD 30 MIN: CPT | Performed by: INTERNAL MEDICINE

## 2023-01-30 NOTE — PROGRESS NOTES
"Ewelina Yost is a 62 y.o. female.     History of Present Illness     Patient is 62-year-old female who came in for follow-up.     She has type 2 diabetes mellitus and is on glimepiride 4 mg twice daily, Trulicity 3 mg weekly, metformin  mg 2 tabs BID, and Farxiga 10 mg/day.    Fasting glucose .  Suppertime glucose .  She has lost 3 pounds since 7/22.  Her last meal was last night.     Her last eye examination was in Aug 2022.  She has no retinopathy.  She has early cataracts.  Urine microalbumin is normal in 1/22.  She denies numbness, tingling or burning in her hands or feet.     She has hyperlipidemia and is on Lipitor 80 mg/day.  She denies myalgia.     She has hypertension and is on irbesartan 150 mg once a day and amlodipine 2.5 mg/day.  She denies chest pain or shortness of breath.     She has osteopenia on bone density done in August 2022 with a slight decrease compared to 2019.  Her 10-year risk for major osteoporotic fracture is 16% and of hip fracture is 0.9%.  She has vitamin D deficiency and is on vitamin D3 2000 units/day.          The following portions of the patient's history were reviewed and updated as appropriate: allergies, current medications, past family history, past medical history, past social history, past surgical history and problem list.    Review of Systems   Eyes: Negative for visual disturbance.   Respiratory: Negative for shortness of breath.    Cardiovascular: Negative for chest pain and palpitations.   Gastrointestinal: Negative.    Endocrine: Negative for cold intolerance and heat intolerance.   Genitourinary: Negative.    Musculoskeletal: Negative.  Negative for myalgias.   Neurological: Negative for numbness.     Vitals:    01/30/23 0934   BP: 130/74   Pulse: 93   Temp: 97.8 °F (36.6 °C)   TempSrc: Temporal   SpO2: 98%   Weight: 59.1 kg (130 lb 3.2 oz)   Height: 162.6 cm (64.02\")      Objective   Physical Exam  Constitutional:       General: She is " not in acute distress.     Appearance: Normal appearance. She is not ill-appearing, toxic-appearing or diaphoretic.   Eyes:      General: No scleral icterus.        Right eye: No discharge.         Left eye: No discharge.      Extraocular Movements: Extraocular movements intact.      Conjunctiva/sclera: Conjunctivae normal.   Neck:      Vascular: No carotid bruit.   Cardiovascular:      Rate and Rhythm: Normal rate and regular rhythm.      Pulses: Normal pulses.      Heart sounds: Normal heart sounds.   Pulmonary:      Breath sounds: Normal breath sounds. No rales.   Chest:      Chest wall: No tenderness.   Abdominal:      General: Bowel sounds are normal.      Palpations: Abdomen is soft.      Tenderness: There is no right CVA tenderness or left CVA tenderness.   Musculoskeletal:      Cervical back: No tenderness.   Lymphadenopathy:      Cervical: No cervical adenopathy.   Skin:     General: Skin is warm and dry.   Neurological:      Mental Status: She is alert and oriented to person, place, and time.       Office Visit on 07/25/2022   Component Date Value Ref Range Status   • Glucose 07/25/2022 114 (H)  65 - 99 mg/dL Final   • BUN 07/25/2022 12  8 - 27 mg/dL Final   • Creatinine 07/25/2022 0.70  0.57 - 1.00 mg/dL Final   • EGFR Result 07/25/2022 97  >59 mL/min/1.73 Final   • BUN/Creatinine Ratio 07/25/2022 17  12 - 28 Final   • Sodium 07/25/2022 139  134 - 144 mmol/L Final   • Potassium 07/25/2022 4.1  3.5 - 5.2 mmol/L Final   • Chloride 07/25/2022 101  96 - 106 mmol/L Final   • Total CO2 07/25/2022 22  20 - 29 mmol/L Final   • Calcium 07/25/2022 9.9  8.7 - 10.3 mg/dL Final   • Total Protein 07/25/2022 6.6  6.0 - 8.5 g/dL Final   • Albumin 07/25/2022 4.5  3.8 - 4.8 g/dL Final   • Globulin 07/25/2022 2.1  1.5 - 4.5 g/dL Final   • A/G Ratio 07/25/2022 2.1  1.2 - 2.2 Final   • Total Bilirubin 07/25/2022 0.3  0.0 - 1.2 mg/dL Final   • Alkaline Phosphatase 07/25/2022 136 (H)  44 - 121 IU/L Final   • AST (SGOT)  07/25/2022 12  0 - 40 IU/L Final   • ALT (SGPT) 07/25/2022 11  0 - 32 IU/L Final   • Total Cholesterol 07/25/2022 151  100 - 199 mg/dL Final   • Triglycerides 07/25/2022 163 (H)  0 - 149 mg/dL Final   • HDL Cholesterol 07/25/2022 38 (L)  >39 mg/dL Final   • VLDL Cholesterol Caleb 07/25/2022 28  5 - 40 mg/dL Final   • LDL Chol Calc (NIH) 07/25/2022 85  0 - 99 mg/dL Final   • Hemoglobin A1C 07/25/2022 7.0 (H)  4.8 - 5.6 % Final    Comment:          Prediabetes: 5.7 - 6.4           Diabetes: >6.4           Glycemic control for adults with diabetes: <7.0     • TSH 07/25/2022 1.290  0.450 - 4.500 uIU/mL Final   • Free T4 07/25/2022 1.31  0.82 - 1.77 ng/dL Final   • 25 Hydroxy, Vitamin D 07/25/2022 52.1  30.0 - 100.0 ng/mL Final    Comment: Vitamin D deficiency has been defined by the [place] of  Medicine and an Endocrine Society practice guideline as a  level of serum 25-OH vitamin D less than 20 ng/mL (1,2).  The Endocrine Society went on to further define vitamin D  insufficiency as a level between 21 and 29 ng/mL (2).  1. IOM ([place] of [employer]). 2010. Dietary reference     intakes for calcium and D. [place]: The     National Academies Press.  2. Lamonte MF, Rossy SHERIDAN, Sierra POWELL, et al.     Evaluation, treatment, and prevention of vitamin D     deficiency: an Endocrine Society clinical practice     guideline. JCEM. 2011 Jul; 96(7):1911-30.     • Interpretation 07/25/2022 Note   Final    Supplemental report is available.     Assessment & Plan   Diagnoses and all orders for this visit:    1. Type 2 diabetes mellitus without complication, with long-term current use of insulin (HCC) (Primary)    2. Hyperlipidemia, unspecified hyperlipidemia type    3. Essential hypertension    4. Osteopenia of multiple sites      Continue glimepiride 4 mg twice a day, Trulicity 3 mg weekly, metformin  mg 2 tablets twice a day and Farxiga 10 mg daily.  Continue Lipitor 80 mg a day.  Continue irbesartan 150 mg/day  and amlodipine 2.5 mg/day.  Continue vitamin D3 2000 units/day.    Repeat bone density after August 2024.    Copy of my note sent to Nicki Rogers NP.    RTC 4 mos.

## 2023-01-31 LAB
25(OH)D3+25(OH)D2 SERPL-MCNC: 47.3 NG/ML (ref 30–100)
ALBUMIN SERPL-MCNC: 4.6 G/DL (ref 3.5–5.2)
ALBUMIN/CREAT UR: 6 MG/G CREAT (ref 0–29)
ALBUMIN/GLOB SERPL: 2.4 G/DL
ALP SERPL-CCNC: 139 U/L (ref 39–117)
ALT SERPL-CCNC: 16 U/L (ref 1–33)
AST SERPL-CCNC: 14 U/L (ref 1–32)
BILIRUB SERPL-MCNC: 0.3 MG/DL (ref 0–1.2)
BUN SERPL-MCNC: 7 MG/DL (ref 8–23)
BUN/CREAT SERPL: 10.4 (ref 7–25)
CALCIUM SERPL-MCNC: 9.7 MG/DL (ref 8.6–10.5)
CHLORIDE SERPL-SCNC: 101 MMOL/L (ref 98–107)
CHOLEST SERPL-MCNC: 155 MG/DL (ref 0–200)
CO2 SERPL-SCNC: 30.2 MMOL/L (ref 22–29)
CREAT SERPL-MCNC: 0.67 MG/DL (ref 0.57–1)
CREAT UR-MCNC: 49.1 MG/DL
EGFRCR SERPLBLD CKD-EPI 2021: 98.4 ML/MIN/1.73
GLOBULIN SER CALC-MCNC: 1.9 GM/DL
GLUCOSE SERPL-MCNC: 139 MG/DL (ref 65–99)
HBA1C MFR BLD: 6.6 % (ref 4.8–5.6)
HDLC SERPL-MCNC: 40 MG/DL (ref 40–60)
IMP & REVIEW OF LAB RESULTS: NORMAL
LDLC SERPL CALC-MCNC: 90 MG/DL (ref 0–100)
MICROALBUMIN UR-MCNC: 3.1 UG/ML
POTASSIUM SERPL-SCNC: 4.1 MMOL/L (ref 3.5–5.2)
PROT SERPL-MCNC: 6.5 G/DL (ref 6–8.5)
SODIUM SERPL-SCNC: 139 MMOL/L (ref 136–145)
T4 FREE SERPL-MCNC: 1.2 NG/DL (ref 0.93–1.7)
TRIGL SERPL-MCNC: 140 MG/DL (ref 0–150)
TSH SERPL DL<=0.005 MIU/L-ACNC: 1.76 UIU/ML (ref 0.27–4.2)
VLDLC SERPL CALC-MCNC: 25 MG/DL (ref 5–40)

## 2023-02-01 NOTE — PROGRESS NOTES
Hemoglobin A1c lower at 6.6%.  Diabetes in better control.  Normal vitamin D.  Continue vitamin D3 2000 units/day.  Normal urine microalbumin.  Continue irbesartan.  Normal thyroid function tests.  LDL 90.  HDL 40.  Triglycerides 140.  Continue Lipitor 80 mg/day.  Copy of labs sent to iNcki Rogers NP and to patient through [employer].

## 2023-02-26 DIAGNOSIS — Z79.4 TYPE 2 DIABETES MELLITUS WITHOUT COMPLICATION, WITH LONG-TERM CURRENT USE OF INSULIN: ICD-10-CM

## 2023-02-26 DIAGNOSIS — E11.9 TYPE 2 DIABETES MELLITUS WITHOUT COMPLICATION, WITH LONG-TERM CURRENT USE OF INSULIN: ICD-10-CM

## 2023-02-28 RX ORDER — METFORMIN HYDROCHLORIDE 500 MG/1
1000 TABLET, EXTENDED RELEASE ORAL 2 TIMES DAILY WITH MEALS
Qty: 360 TABLET | Refills: 1 | Status: SHIPPED | OUTPATIENT
Start: 2023-02-28

## 2023-03-30 RX ORDER — GLIMEPIRIDE 4 MG/1
4 TABLET ORAL 2 TIMES DAILY
Qty: 180 TABLET | Refills: 2 | Status: CANCELLED | OUTPATIENT
Start: 2023-03-30

## 2023-04-10 RX ORDER — GLIMEPIRIDE 4 MG/1
4 TABLET ORAL 2 TIMES DAILY
Qty: 180 TABLET | Refills: 2 | Status: SHIPPED | OUTPATIENT
Start: 2023-04-10 | End: 2023-07-10

## 2023-04-12 RX ORDER — DAPAGLIFLOZIN 10 MG/1
10 TABLET, FILM COATED ORAL DAILY
Qty: 90 TABLET | Refills: 1 | Status: SHIPPED | OUTPATIENT
Start: 2023-04-12

## 2023-04-17 RX ORDER — IRBESARTAN 150 MG/1
150 TABLET ORAL DAILY
Qty: 90 TABLET | Refills: 1 | Status: SHIPPED | OUTPATIENT
Start: 2023-04-17

## 2023-07-31 DIAGNOSIS — E78.5 HYPERLIPIDEMIA, UNSPECIFIED HYPERLIPIDEMIA TYPE: ICD-10-CM

## 2023-08-01 RX ORDER — AMLODIPINE BESYLATE 2.5 MG/1
2.5 TABLET ORAL DAILY
Qty: 90 TABLET | Refills: 1 | Status: SHIPPED | OUTPATIENT
Start: 2023-08-01 | End: 2024-07-31

## 2023-08-01 RX ORDER — ATORVASTATIN CALCIUM 80 MG/1
80 TABLET, FILM COATED ORAL DAILY
Qty: 90 TABLET | Refills: 1 | Status: SHIPPED | OUTPATIENT
Start: 2023-08-01

## 2023-08-14 ENCOUNTER — OFFICE VISIT (OUTPATIENT)
Dept: ENDOCRINOLOGY | Age: 63
End: 2023-08-14
Payer: COMMERCIAL

## 2023-08-14 VITALS
BODY MASS INDEX: 22.26 KG/M2 | DIASTOLIC BLOOD PRESSURE: 80 MMHG | WEIGHT: 130.4 LBS | TEMPERATURE: 96.6 F | HEIGHT: 64 IN | OXYGEN SATURATION: 98 % | SYSTOLIC BLOOD PRESSURE: 120 MMHG | HEART RATE: 95 BPM

## 2023-08-14 DIAGNOSIS — E78.5 HYPERLIPIDEMIA, UNSPECIFIED HYPERLIPIDEMIA TYPE: ICD-10-CM

## 2023-08-14 DIAGNOSIS — M85.89 OSTEOPENIA OF MULTIPLE SITES: ICD-10-CM

## 2023-08-14 DIAGNOSIS — E55.9 VITAMIN D DEFICIENCY: ICD-10-CM

## 2023-08-14 DIAGNOSIS — E11.9 TYPE 2 DIABETES MELLITUS WITHOUT COMPLICATION, WITH LONG-TERM CURRENT USE OF INSULIN: Primary | ICD-10-CM

## 2023-08-14 DIAGNOSIS — I10 ESSENTIAL HYPERTENSION: ICD-10-CM

## 2023-08-14 DIAGNOSIS — Z79.4 TYPE 2 DIABETES MELLITUS WITHOUT COMPLICATION, WITH LONG-TERM CURRENT USE OF INSULIN: Primary | ICD-10-CM

## 2023-08-14 LAB
25(OH)D3+25(OH)D2 SERPL-MCNC: 55.4 NG/ML (ref 30–100)
ALBUMIN SERPL-MCNC: 4.6 G/DL (ref 3.5–5.2)
ALBUMIN/GLOB SERPL: 2.4 G/DL
ALP SERPL-CCNC: 117 U/L (ref 39–117)
ALT SERPL-CCNC: 14 U/L (ref 1–33)
AST SERPL-CCNC: 13 U/L (ref 1–32)
BILIRUB SERPL-MCNC: 0.4 MG/DL (ref 0–1.2)
BUN SERPL-MCNC: 8 MG/DL (ref 8–23)
BUN/CREAT SERPL: 12.9 (ref 7–25)
CALCIUM SERPL-MCNC: 9.8 MG/DL (ref 8.6–10.5)
CHLORIDE SERPL-SCNC: 100 MMOL/L (ref 98–107)
CHOLEST SERPL-MCNC: 144 MG/DL (ref 0–200)
CO2 SERPL-SCNC: 23.1 MMOL/L (ref 22–29)
CREAT SERPL-MCNC: 0.62 MG/DL (ref 0.57–1)
EGFRCR SERPLBLD CKD-EPI 2021: 99.6 ML/MIN/1.73
GLOBULIN SER CALC-MCNC: 1.9 GM/DL
GLUCOSE SERPL-MCNC: 70 MG/DL (ref 65–99)
HBA1C MFR BLD: 6.9 % (ref 4.8–5.6)
HDLC SERPL-MCNC: 37 MG/DL (ref 40–60)
IMP & REVIEW OF LAB RESULTS: NORMAL
LDLC SERPL CALC-MCNC: 85 MG/DL (ref 0–100)
POTASSIUM SERPL-SCNC: 4.3 MMOL/L (ref 3.5–5.2)
PROT SERPL-MCNC: 6.5 G/DL (ref 6–8.5)
SODIUM SERPL-SCNC: 138 MMOL/L (ref 136–145)
TRIGL SERPL-MCNC: 124 MG/DL (ref 0–150)
TSH SERPL DL<=0.005 MIU/L-ACNC: 1.52 UIU/ML (ref 0.27–4.2)
VLDLC SERPL CALC-MCNC: 22 MG/DL (ref 5–40)

## 2023-08-14 PROCEDURE — 99214 OFFICE O/P EST MOD 30 MIN: CPT | Performed by: INTERNAL MEDICINE

## 2023-08-14 RX ORDER — DAPAGLIFLOZIN 10 MG/1
10 TABLET, FILM COATED ORAL DAILY
Qty: 90 TABLET | Refills: 2 | Status: SHIPPED | OUTPATIENT
Start: 2023-08-14

## 2023-08-14 RX ORDER — IRBESARTAN 150 MG/1
150 TABLET ORAL DAILY
Qty: 90 TABLET | Refills: 2 | Status: SHIPPED | OUTPATIENT
Start: 2023-08-14

## 2023-08-14 RX ORDER — METFORMIN HYDROCHLORIDE 500 MG/1
1000 TABLET, EXTENDED RELEASE ORAL 2 TIMES DAILY WITH MEALS
Qty: 360 TABLET | Refills: 2 | Status: SHIPPED | OUTPATIENT
Start: 2023-08-14

## 2023-08-14 NOTE — PROGRESS NOTES
"Ewelina Yost is a 63 y.o. female.     History of Present Illness     She has type 2 diabetes mellitus and is on glimepiride 4 mg twice daily, Trulicity 4.5 mg weekly, metformin  mg 2 tabs BID, and Farxiga 10 mg/day.    Fasting glucose . Suppertime glucose 102-145.  She has no significant weight change in 1/23.  Her last meal was last night.     Her last eye examination was in Aug 2022.  She has no retinopathy.  She has early cataracts.  Urine microalbumin is normal in 1/23.  She denies numbness, tingling or burning in her hands or feet.     She has hyperlipidemia and is on Lipitor 80 mg/day.  She denies myalgia.     She has hypertension and is on irbesartan 150 mg once a day and amlodipine 2.5 mg/day.  She denies chest pain or shortness of breath.     She has osteopenia on bone density done in August 2022 with a slight decrease compared to 2019.  Her 10-year risk for major osteoporotic fracture is 16% and of hip fracture is 0.9%.  She has vitamin D deficiency and is on vitamin D3 2000 units/day.  She walks at work.    The following portions of the patient's history were reviewed and updated as appropriate: allergies, current medications, past family history, past medical history, past social history, past surgical history, and problem list.    Review of Systems   Eyes:  Negative for visual disturbance.   Respiratory:  Negative for shortness of breath.    Cardiovascular:  Negative for chest pain and palpitations.   Gastrointestinal: Negative.    Endocrine: Negative for cold intolerance and heat intolerance.   Genitourinary: Negative.    Musculoskeletal:  Negative for myalgias.   Neurological:  Negative for numbness.   Vitals:    08/14/23 0847   BP: 120/80   Pulse: 95   Temp: 96.6 øF (35.9 øC)   TempSrc: Temporal   SpO2: 98%   Weight: 59.1 kg (130 lb 6.4 oz)   Height: 162.6 cm (64.02\")      Objective   Physical Exam  Constitutional:       General: She is not in acute distress.     Appearance: " Normal appearance. She is not ill-appearing.   Eyes:      General: No scleral icterus.        Right eye: No discharge.         Left eye: No discharge.   Neck:      Vascular: No carotid bruit.   Cardiovascular:      Rate and Rhythm: Normal rate and regular rhythm.      Heart sounds: Normal heart sounds. No murmur heard.    No friction rub.   Pulmonary:      Effort: No respiratory distress.      Breath sounds: Normal breath sounds. No stridor.   Chest:      Chest wall: No tenderness.   Abdominal:      General: Bowel sounds are normal.      Palpations: Abdomen is soft.      Tenderness: There is no right CVA tenderness or left CVA tenderness.   Musculoskeletal:      Right lower leg: No edema.      Left lower leg: No edema.   Lymphadenopathy:      Cervical: No cervical adenopathy.   Skin:     General: Skin is warm and dry.   Neurological:      Mental Status: She is alert and oriented to person, place, and time.   Psychiatric:         Mood and Affect: Mood normal.         Behavior: Behavior normal.     Lab on 01/30/2023   Component Date Value Ref Range Status    Creatinine, Urine 01/30/2023 49.1  Not Estab. mg/dL Final    Microalbumin, Urine 01/30/2023 3.1  Not Estab. ug/mL Final    Microalbumin/Creatinine Ratio 01/30/2023 6  0 - 29 mg/g creat Final    Comment:                        Normal:                0 -  29                         Moderately increased: 30 - 300                         Severely increased:       >300      25 Hydroxy, Vitamin D 01/30/2023 47.3  30.0 - 100.0 ng/ml Final    Comment: Reference Range for Total Vitamin D 25(OH)  Deficiency <20.0 ng/mL  Insufficiency 21-29 ng/mL  Sufficiency  ng/mL  Toxicity >100 ng/ml      Free T4 01/30/2023 1.20  0.93 - 1.70 ng/dL Final    Results may be falsely increased if patient taking Biotin.    TSH 01/30/2023 1.760  0.270 - 4.200 uIU/mL Final    Hemoglobin A1C 01/30/2023 6.60 (H)  4.80 - 5.60 % Final    Comment: Hemoglobin A1C Ranges:  Increased Risk for  Diabetes  5.7% to 6.4%  Diabetes                     >= 6.5%  Diabetic Goal                < 7.0%      Total Cholesterol 01/30/2023 155  0 - 200 mg/dL Final    Comment: Cholesterol Reference Ranges  (U.S. Department of Health and Human Services ATP III  Classifications)  Desirable          <200 mg/dL  Borderline High    200-239 mg/dL  High Risk          >240 mg/dL  Triglyceride Reference Ranges  (U.S. Department of Health and Human Services ATP III  Classifications)  Normal           <150 mg/dL  Borderline High  150-199 mg/dL  High             200-499 mg/dL  Very High        >500 mg/dL  HDL Reference Ranges  (U.S. Department of Health and Human Services ATP III  Classifications)  Low     <40 mg/dl (major risk factor for CHD)  High    >60 mg/dl ('negative' risk factor for CHD)  LDL Reference Ranges  (U.S. Department of Health and Human Services ATP III  Classifications)  Optimal          <100 mg/dL  Near Optimal     100-129 mg/dL  Borderline High  130-159 mg/dL  High             160-189 mg/dL  Very High        >189 mg/dL      Triglycerides 01/30/2023 140  0 - 150 mg/dL Final    HDL Cholesterol 01/30/2023 40  40 - 60 mg/dL Final    VLDL Cholesterol Caleb 01/30/2023 25  5 - 40 mg/dL Final    LDL Chol Calc (NIH) 01/30/2023 90  0 - 100 mg/dL Final    Glucose 01/30/2023 139 (H)  65 - 99 mg/dL Final    BUN 01/30/2023 7 (L)  8 - 23 mg/dL Final    Creatinine 01/30/2023 0.67  0.57 - 1.00 mg/dL Final    EGFR Result 01/30/2023 98.4  >60.0 mL/min/1.73 Final    Comment: GFR Normal >60  Chronic Kidney Disease <60  Kidney Failure <15      BUN/Creatinine Ratio 01/30/2023 10.4  7.0 - 25.0 Final    Sodium 01/30/2023 139  136 - 145 mmol/L Final    Potassium 01/30/2023 4.1  3.5 - 5.2 mmol/L Final    Chloride 01/30/2023 101  98 - 107 mmol/L Final    Total CO2 01/30/2023 30.2 (H)  22.0 - 29.0 mmol/L Final    Calcium 01/30/2023 9.7  8.6 - 10.5 mg/dL Final    Total Protein 01/30/2023 6.5  6.0 - 8.5 g/dL Final    Albumin 01/30/2023 4.6  3.5 -  5.2 g/dL Final    Globulin 01/30/2023 1.9  gm/dL Final    A/G Ratio 01/30/2023 2.4  g/dL Final    Total Bilirubin 01/30/2023 0.3  0.0 - 1.2 mg/dL Final    Alkaline Phosphatase 01/30/2023 139 (H)  39 - 117 U/L Final    AST (SGOT) 01/30/2023 14  1 - 32 U/L Final    ALT (SGPT) 01/30/2023 16  1 - 33 U/L Final    Interpretation 01/30/2023 Note   Final    Supplemental report is available.     Assessment & Plan   Diagnoses and all orders for this visit:    1. Type 2 diabetes mellitus without complication, with long-term current use of insulin (Primary)  -     Comprehensive Metabolic Panel  -     Lipid Panel  -     Hemoglobin A1c  -     TSH    2. Hyperlipidemia, unspecified hyperlipidemia type  -     Lipid Panel  -     TSH    3. Essential hypertension  -     Comprehensive Metabolic Panel    4. Osteopenia of multiple sites  -     TSH  -     Vitamin D,25-Hydroxy    5. Vitamin D deficiency  -     Vitamin D,25-Hydroxy      Continue glimepiride 4 mg twice a day, Trulicity 4.5 mg weekly, metformin  mg 2 tablets twice a day, and Farxiga 10 mg daily.  Continue Lipitor 80 mg/day.  Continue irbesartan 150 mg/day and amlodipine 2.5 mg/day.  Continue vitamin D3 2000 units/day.  Repeat bone density after August 2024.  Flu vaccine this fall.    Copy of my note sent to Nicki Rogers NP.    RTC 4 mos.

## 2023-08-15 NOTE — PROGRESS NOTES
LDL 85.  HDL 37.  Continue Lipitor 80 mg/day.  Hemoglobin A1c 6.9%.  Diabetes well controlled.  Normal thyroid function tests.  Normal vitamin D.  Continue vitamin D3 2000 units.  Copy of labs sent to Nicki Rogers NP and to patient through [employer].

## 2023-12-17 DIAGNOSIS — Z79.4 TYPE 2 DIABETES MELLITUS WITHOUT COMPLICATION, WITH LONG-TERM CURRENT USE OF INSULIN: Primary | ICD-10-CM

## 2023-12-17 DIAGNOSIS — E11.9 TYPE 2 DIABETES MELLITUS WITHOUT COMPLICATION, WITH LONG-TERM CURRENT USE OF INSULIN: Primary | ICD-10-CM

## 2023-12-17 RX ORDER — GLIMEPIRIDE 4 MG/1
4 TABLET ORAL 2 TIMES DAILY
Qty: 180 TABLET | Refills: 2 | Status: CANCELLED | OUTPATIENT
Start: 2023-12-17 | End: 2024-03-16

## 2023-12-18 RX ORDER — GLIMEPIRIDE 4 MG/1
4 TABLET ORAL 2 TIMES DAILY
Qty: 180 TABLET | Refills: 2 | Status: SHIPPED | OUTPATIENT
Start: 2023-12-18

## 2023-12-25 DIAGNOSIS — I10 ESSENTIAL HYPERTENSION: Primary | ICD-10-CM

## 2023-12-25 DIAGNOSIS — E78.5 HYPERLIPIDEMIA, UNSPECIFIED HYPERLIPIDEMIA TYPE: ICD-10-CM

## 2023-12-26 RX ORDER — ATORVASTATIN CALCIUM 80 MG/1
80 TABLET, FILM COATED ORAL DAILY
Qty: 90 TABLET | Refills: 2 | Status: SHIPPED | OUTPATIENT
Start: 2023-12-26

## 2023-12-26 RX ORDER — AMLODIPINE BESYLATE 2.5 MG/1
2.5 TABLET ORAL DAILY
Qty: 90 TABLET | Refills: 2 | Status: SHIPPED | OUTPATIENT
Start: 2023-12-26 | End: 2024-12-25

## 2024-01-05 NOTE — TELEPHONE ENCOUNTER
Rx Refill Note  Requested Prescriptions     Pending Prescriptions Disp Refills    Dulaglutide (Trulicity) 4.5 MG/0.5ML solution pen-injector 6 mL 2     Sig: Inject 0.5 mL under the skin into the appropriate area as directed 1 (One) Time Per Week.      Last office visit with prescribing clinician: 8/14/2023   Last telemedicine visit with prescribing clinician: Visit date not found   Next office visit with prescribing clinician: 2/26/2024                         Would you like a call back once the refill request has been completed: [] Yes [] No    If the office needs to give you a call back, can they leave a voicemail: [] Yes [] No    Yani Bills MA  01/05/24, 15:13 EST

## 2024-01-06 RX ORDER — DULAGLUTIDE 4.5 MG/.5ML
4.5 INJECTION, SOLUTION SUBCUTANEOUS WEEKLY
Qty: 6 ML | Refills: 2 | Status: SHIPPED | OUTPATIENT
Start: 2024-01-06

## 2024-02-26 ENCOUNTER — OFFICE VISIT (OUTPATIENT)
Dept: ENDOCRINOLOGY | Age: 64
End: 2024-02-26
Payer: COMMERCIAL

## 2024-02-26 VITALS
OXYGEN SATURATION: 98 % | HEIGHT: 64 IN | DIASTOLIC BLOOD PRESSURE: 80 MMHG | WEIGHT: 130 LBS | HEART RATE: 58 BPM | SYSTOLIC BLOOD PRESSURE: 130 MMHG | TEMPERATURE: 97.1 F | BODY MASS INDEX: 22.2 KG/M2

## 2024-02-26 DIAGNOSIS — I10 ESSENTIAL HYPERTENSION: ICD-10-CM

## 2024-02-26 DIAGNOSIS — E78.5 HYPERLIPIDEMIA, UNSPECIFIED HYPERLIPIDEMIA TYPE: ICD-10-CM

## 2024-02-26 DIAGNOSIS — M85.89 OSTEOPENIA OF MULTIPLE SITES: ICD-10-CM

## 2024-02-26 DIAGNOSIS — Z79.4 TYPE 2 DIABETES MELLITUS WITHOUT COMPLICATION, WITH LONG-TERM CURRENT USE OF INSULIN: Primary | ICD-10-CM

## 2024-02-26 DIAGNOSIS — E11.9 TYPE 2 DIABETES MELLITUS WITHOUT COMPLICATION, WITH LONG-TERM CURRENT USE OF INSULIN: Primary | ICD-10-CM

## 2024-02-26 DIAGNOSIS — E55.9 VITAMIN D DEFICIENCY: ICD-10-CM

## 2024-02-26 NOTE — PROGRESS NOTES
"Ewelina Yost is a 64 y.o. female.     History of Present Illness     She has type 2 diabetes mellitus and is on glimepiride 4 mg twice daily, Trulicity 4.5 mg weekly, metformin  mg 2 tabs BID, and Farxiga 10 mg/day.   She has her retired and has switched to Medicare.  She has no significant weight change in 8/23.  Her last meal was last night.     Her last eye examination was in Aug 2023.  She has no retinopathy.  She has early cataracts.  Urine microalbumin is normal in 1/23.  She denies numbness, tingling or burning in her hands or feet.     She has hyperlipidemia and is on Lipitor 80 mg/day.  She denies myalgia.     She has hypertension and is on irbesartan 150 mg once a day and amlodipine 2.5 mg/day.  She denies chest pain or shortness of breath.     She has osteopenia on bone density done in August 2022 with a slight decrease compared to 2019.  Her 10-year risk for major osteoporotic fracture is 16% and of hip fracture is 0.9%.  She has vitamin D deficiency and is on vitamin D3 2000 units/day.  She walks at work.    The following portions of the patient's history were reviewed and updated as appropriate: allergies, current medications, past family history, past medical history, past social history, past surgical history, and problem list.    Review of Systems   Eyes:  Negative for visual disturbance.   Respiratory:  Negative for shortness of breath.    Cardiovascular:  Negative for chest pain and palpitations.   Gastrointestinal: Negative.    Genitourinary: Negative.    Musculoskeletal:  Negative for myalgias.   Neurological:  Negative for numbness.     Vitals:    02/26/24 0827   BP: 130/80   Pulse: 58   Temp: 97.1 °F (36.2 °C)   TempSrc: Temporal   SpO2: 98%   Weight: 59 kg (130 lb)   Height: 162.6 cm (64.02\")      Objective   Physical Exam  Constitutional:       Appearance: Normal appearance. She is not toxic-appearing or diaphoretic.   HENT:      Mouth/Throat:      Pharynx: No oropharyngeal " exudate or posterior oropharyngeal erythema.   Eyes:      General: No scleral icterus.        Right eye: No discharge.         Left eye: No discharge.   Cardiovascular:      Rate and Rhythm: Normal rate and regular rhythm.      Heart sounds: Normal heart sounds. No murmur heard.     No friction rub.   Pulmonary:      Effort: No respiratory distress.      Breath sounds: Normal breath sounds. No stridor. No rales.   Chest:      Chest wall: No tenderness.   Abdominal:      General: Bowel sounds are normal.      Palpations: Abdomen is soft.      Tenderness: There is no right CVA tenderness or left CVA tenderness.   Musculoskeletal:      Right lower leg: No edema.      Left lower leg: No edema.      Comments: Feet warm.  No cyanosis.  No plantar ulcers.   Skin:     General: Skin is warm.   Neurological:      Mental Status: She is alert and oriented to person, place, and time.      Comments: Intact light touch in lower extremities.   Psychiatric:         Mood and Affect: Mood normal.         Behavior: Behavior normal.       Office Visit on 08/14/2023   Component Date Value Ref Range Status    Glucose 08/14/2023 70  65 - 99 mg/dL Final    BUN 08/14/2023 8  8 - 23 mg/dL Final    Creatinine 08/14/2023 0.62  0.57 - 1.00 mg/dL Final    EGFR Result 08/14/2023 99.6  >60.0 mL/min/1.73 Final    Comment: GFR Normal >60  Chronic Kidney Disease <60  Kidney Failure <15      BUN/Creatinine Ratio 08/14/2023 12.9  7.0 - 25.0 Final    Sodium 08/14/2023 138  136 - 145 mmol/L Final    Potassium 08/14/2023 4.3  3.5 - 5.2 mmol/L Final    Chloride 08/14/2023 100  98 - 107 mmol/L Final    Total CO2 08/14/2023 23.1  22.0 - 29.0 mmol/L Final    Calcium 08/14/2023 9.8  8.6 - 10.5 mg/dL Final    Total Protein 08/14/2023 6.5  6.0 - 8.5 g/dL Final    Albumin 08/14/2023 4.6  3.5 - 5.2 g/dL Final    Globulin 08/14/2023 1.9  gm/dL Final    A/G Ratio 08/14/2023 2.4  g/dL Final    Total Bilirubin 08/14/2023 0.4  0.0 - 1.2 mg/dL Final    Alkaline  Phosphatase 08/14/2023 117  39 - 117 U/L Final    AST (SGOT) 08/14/2023 13  1 - 32 U/L Final    ALT (SGPT) 08/14/2023 14  1 - 33 U/L Final    Total Cholesterol 08/14/2023 144  0 - 200 mg/dL Final    Comment: Cholesterol Reference Ranges  (U.S. Department of Health and Human Services ATP III  Classifications)  Desirable          <200 mg/dL  Borderline High    200-239 mg/dL  High Risk          >240 mg/dL  Triglyceride Reference Ranges  (U.S. Department of Health and Human Services ATP III  Classifications)  Normal           <150 mg/dL  Borderline High  150-199 mg/dL  High             200-499 mg/dL  Very High        >500 mg/dL  HDL Reference Ranges  (U.S. Department of Health and Human Services ATP III  Classifications)  Low     <40 mg/dl (major risk factor for CHD)  High    >60 mg/dl ('negative' risk factor for CHD)  LDL Reference Ranges  (U.S. Department of Health and Human Services ATP III  Classifications)  Optimal          <100 mg/dL  Near Optimal     100-129 mg/dL  Borderline High  130-159 mg/dL  High             160-189 mg/dL  Very High        >189 mg/dL      Triglycerides 08/14/2023 124  0 - 150 mg/dL Final    HDL Cholesterol 08/14/2023 37 (L)  40 - 60 mg/dL Final    VLDL Cholesterol Caleb 08/14/2023 22  5 - 40 mg/dL Final    LDL Chol Calc (NIH) 08/14/2023 85  0 - 100 mg/dL Final    Hemoglobin A1C 08/14/2023 6.90 (H)  4.80 - 5.60 % Final    Comment: Hemoglobin A1C Ranges:  Increased Risk for Diabetes  5.7% to 6.4%  Diabetes                     >= 6.5%  Diabetic Goal                < 7.0%      TSH 08/14/2023 1.520  0.270 - 4.200 uIU/mL Final    25 Hydroxy, Vitamin D 08/14/2023 55.4  30.0 - 100.0 ng/ml Final    Comment: Reference Range for Total Vitamin D 25(OH)  Deficiency <20.0 ng/mL  Insufficiency 21-29 ng/mL  Sufficiency  ng/mL  Toxicity >100 ng/ml      Interpretation 08/14/2023 Note   Final    Supplemental report is available.     Assessment & Plan   Diagnoses and all orders for this visit:    1. Type 2  diabetes mellitus without complication, with long-term current use of insulin (Primary)  -     Comprehensive Metabolic Panel  -     Hemoglobin A1c  -     Microalbumin / Creatinine Urine Ratio - Urine, Clean Catch    2. Hyperlipidemia, unspecified hyperlipidemia type  -     Lipid Panel  -     TSH    3. Osteopenia of multiple sites  -     Vitamin D,25-Hydroxy    4. Vitamin D deficiency  -     Vitamin D,25-Hydroxy    5. Essential hypertension  -     Comprehensive Metabolic Panel      Continue Trulicity 4.5 mg weekly, Farxiga 10 mg/day, glimepiride 4 mg twice daily, and metformin  mg 2 tablets twice a day.  Discussed with patient about using insulin to reduce drug costs.  She will think about it.  Advised to have an eye exam in the near future and yearly thereafter.    Continue Lipitor 80 mg/day.    Continue irbesartan 150 mg/day and amlodipine 2.5 mg/day.    Continue vitamin D3 2000 units/day.    Copy of my note sent to Nicki Rogers NP.    RTC 4 mos.

## 2024-02-27 LAB
25(OH)D3+25(OH)D2 SERPL-MCNC: 49.5 NG/ML (ref 30–100)
ALBUMIN SERPL-MCNC: 4.4 G/DL (ref 3.5–5.2)
ALBUMIN/CREAT UR: <5 MG/G CREAT (ref 0–29)
ALBUMIN/GLOB SERPL: 1.9 G/DL
ALP SERPL-CCNC: 110 U/L (ref 39–117)
ALT SERPL-CCNC: 10 U/L (ref 1–33)
AST SERPL-CCNC: <5 U/L (ref 1–32)
BILIRUB SERPL-MCNC: 0.4 MG/DL (ref 0–1.2)
BUN SERPL-MCNC: 6 MG/DL (ref 8–23)
BUN/CREAT SERPL: 9.2 (ref 7–25)
CALCIUM SERPL-MCNC: 9.7 MG/DL (ref 8.6–10.5)
CHLORIDE SERPL-SCNC: 102 MMOL/L (ref 98–107)
CHOLEST SERPL-MCNC: 134 MG/DL (ref 0–200)
CO2 SERPL-SCNC: 24.6 MMOL/L (ref 22–29)
CREAT SERPL-MCNC: 0.65 MG/DL (ref 0.57–1)
CREAT UR-MCNC: 55.3 MG/DL
EGFRCR SERPLBLD CKD-EPI 2021: 97.8 ML/MIN/1.73
GLOBULIN SER CALC-MCNC: 2.3 GM/DL
GLUCOSE SERPL-MCNC: 62 MG/DL (ref 65–99)
HBA1C MFR BLD: 7 % (ref 4.8–5.6)
HDLC SERPL-MCNC: 38 MG/DL (ref 40–60)
IMP & REVIEW OF LAB RESULTS: NORMAL
LDLC SERPL CALC-MCNC: 77 MG/DL (ref 0–100)
MICROALBUMIN UR-MCNC: <3 UG/ML
POTASSIUM SERPL-SCNC: 4.5 MMOL/L (ref 3.5–5.2)
PROT SERPL-MCNC: 6.7 G/DL (ref 6–8.5)
SODIUM SERPL-SCNC: 142 MMOL/L (ref 136–145)
TRIGL SERPL-MCNC: 104 MG/DL (ref 0–150)
TSH SERPL DL<=0.005 MIU/L-ACNC: 1.55 UIU/ML (ref 0.27–4.2)
VLDLC SERPL CALC-MCNC: 19 MG/DL (ref 5–40)

## 2024-03-01 NOTE — PROGRESS NOTES
LDL 77.  HDL 38.  Triglycerides 104.  Continue Lipitor 80 mg/day.  Hemoglobin A1c 7.0%.  Diabetes is in fair control.  Generic Farxiga was recently approved by the FDA.  Generic Victoza but not Trulicity was licensed since in December 2023.  Neither drug are currently available in the market.  Normal thyroid function tests.  Normal vitamin D.  Continue vitamin D3 2000 units/day.  Normal urine microalbumin.  Copy of labs sent to Nicki Rogers NP and to patient through [employer].

## 2024-03-05 ENCOUNTER — PATIENT MESSAGE (OUTPATIENT)
Dept: ENDOCRINOLOGY | Age: 64
End: 2024-03-05
Payer: COMMERCIAL

## 2024-03-05 DIAGNOSIS — E11.9 TYPE 2 DIABETES MELLITUS WITHOUT COMPLICATION, WITH LONG-TERM CURRENT USE OF INSULIN: Primary | ICD-10-CM

## 2024-03-05 DIAGNOSIS — Z79.4 TYPE 2 DIABETES MELLITUS WITHOUT COMPLICATION, WITH LONG-TERM CURRENT USE OF INSULIN: Primary | ICD-10-CM

## 2024-03-05 RX ORDER — BLOOD SUGAR DIAGNOSTIC
STRIP MISCELLANEOUS
Qty: 300 EACH | Refills: 3 | Status: SHIPPED | OUTPATIENT
Start: 2024-03-05 | End: 2024-03-07 | Stop reason: SDUPTHER

## 2024-03-05 NOTE — TELEPHONE ENCOUNTER
From: Rafiq Yost  To: Iglesia Douglas  Sent: 3/5/2024 9:28 AM EST  Subject: blood glucose test strips    Dr Douglas could you send a [facility] at [address] [phone] I need to order some for my meter freestyle lite Thank you RMusa

## 2024-03-07 ENCOUNTER — PATIENT MESSAGE (OUTPATIENT)
Dept: ENDOCRINOLOGY | Age: 64
End: 2024-03-07
Payer: COMMERCIAL

## 2024-03-07 DIAGNOSIS — Z79.4 TYPE 2 DIABETES MELLITUS WITHOUT COMPLICATION, WITH LONG-TERM CURRENT USE OF INSULIN: ICD-10-CM

## 2024-03-07 DIAGNOSIS — E11.9 TYPE 2 DIABETES MELLITUS WITHOUT COMPLICATION, WITH LONG-TERM CURRENT USE OF INSULIN: ICD-10-CM

## 2024-03-07 RX ORDER — BLOOD SUGAR DIAGNOSTIC
STRIP MISCELLANEOUS
Qty: 300 EACH | Refills: 3 | Status: CANCELLED | OUTPATIENT
Start: 2024-03-07

## 2024-03-07 RX ORDER — BLOOD SUGAR DIAGNOSTIC
STRIP MISCELLANEOUS
Qty: 300 EACH | Refills: 3 | Status: SHIPPED | OUTPATIENT
Start: 2024-03-07

## 2024-03-07 NOTE — TELEPHONE ENCOUNTER
From: Rafiq Yost  To: Iglesia Douglas  Sent: 3/7/2024 9:19 AM EST  Subject: glucose test strips needed    I need Dr Douglas to send over a script to [facility] for my free style test strips for my glucose meter

## 2024-04-25 RX ORDER — IRBESARTAN 150 MG/1
150 TABLET ORAL DAILY
Qty: 90 TABLET | Refills: 2 | Status: SHIPPED | OUTPATIENT
Start: 2024-04-25

## 2024-04-25 NOTE — TELEPHONE ENCOUNTER
Rx Refill Note  Requested Prescriptions     Pending Prescriptions Disp Refills    irbesartan (AVAPRO) 150 MG tablet [Pharmacy Med Name: IRBESARTAN 150MG TABLETS] 90 tablet 2     Sig: TAKE 1 TABLET BY MOUTH DAILY.      Last office visit with prescribing clinician: 2/26/2024   Last telemedicine visit with prescribing clinician: Visit date not found   Next office visit with prescribing clinician: 8/26/2024                         Would you like a call back once the refill request has been completed: [] Yes [] No    If the office needs to give you a call back, can they leave a voicemail: [] Yes [] No    Cara Bills  04/25/24, 08:11 EDT

## 2024-04-30 RX ORDER — DAPAGLIFLOZIN 10 MG/1
10 TABLET, FILM COATED ORAL DAILY
Qty: 90 TABLET | Refills: 2 | Status: SHIPPED | OUTPATIENT
Start: 2024-04-30 | End: 2024-05-01 | Stop reason: SDUPTHER

## 2024-05-01 DIAGNOSIS — Z79.4 TYPE 2 DIABETES MELLITUS WITHOUT COMPLICATION, WITH LONG-TERM CURRENT USE OF INSULIN: Primary | ICD-10-CM

## 2024-05-01 DIAGNOSIS — E11.9 TYPE 2 DIABETES MELLITUS WITHOUT COMPLICATION, WITH LONG-TERM CURRENT USE OF INSULIN: Primary | ICD-10-CM

## 2024-05-01 RX ORDER — DAPAGLIFLOZIN 10 MG/1
10 TABLET, FILM COATED ORAL DAILY
Qty: 90 TABLET | Refills: 2 | Status: SHIPPED | OUTPATIENT
Start: 2024-05-01

## 2024-05-13 DIAGNOSIS — E11.9 TYPE 2 DIABETES MELLITUS WITHOUT COMPLICATION, WITH LONG-TERM CURRENT USE OF INSULIN: ICD-10-CM

## 2024-05-13 DIAGNOSIS — Z79.4 TYPE 2 DIABETES MELLITUS WITHOUT COMPLICATION, WITH LONG-TERM CURRENT USE OF INSULIN: ICD-10-CM

## 2024-05-13 RX ORDER — METFORMIN HYDROCHLORIDE 500 MG/1
1000 TABLET, EXTENDED RELEASE ORAL 2 TIMES DAILY WITH MEALS
Qty: 360 TABLET | Refills: 2 | Status: SHIPPED | OUTPATIENT
Start: 2024-05-13 | End: 2024-05-15 | Stop reason: SDUPTHER

## 2024-05-13 NOTE — TELEPHONE ENCOUNTER
Rx Refill Note  Requested Prescriptions     Pending Prescriptions Disp Refills    metFORMIN ER (GLUCOPHAGE-XR) 500 MG 24 hr tablet 360 tablet 2     Sig: Take 2 tablets by mouth 2 (Two) Times a Day With Meals.      Last office visit with prescribing clinician: 2/26/2024   Last telemedicine visit with prescribing clinician: Visit date not found   Next office visit with prescribing clinician: 8/26/2024                         Would you like a call back once the refill request has been completed: [] Yes [] No    If the office needs to give you a call back, can they leave a voicemail: [] Yes [] No    Cara Bills  05/13/24, 09:32 EDT

## 2024-05-15 DIAGNOSIS — E11.9 TYPE 2 DIABETES MELLITUS WITHOUT COMPLICATION, WITH LONG-TERM CURRENT USE OF INSULIN: ICD-10-CM

## 2024-05-15 DIAGNOSIS — Z79.4 TYPE 2 DIABETES MELLITUS WITHOUT COMPLICATION, WITH LONG-TERM CURRENT USE OF INSULIN: ICD-10-CM

## 2024-05-16 RX ORDER — METFORMIN HYDROCHLORIDE 500 MG/1
1000 TABLET, EXTENDED RELEASE ORAL 2 TIMES DAILY WITH MEALS
Qty: 360 TABLET | Refills: 2 | Status: SHIPPED | OUTPATIENT
Start: 2024-05-16

## 2024-05-17 ENCOUNTER — PATIENT MESSAGE (OUTPATIENT)
Dept: ENDOCRINOLOGY | Age: 64
End: 2024-05-17
Payer: COMMERCIAL

## 2024-05-17 RX ORDER — SEMAGLUTIDE 0.68 MG/ML
INJECTION, SOLUTION SUBCUTANEOUS
Qty: 9 ML | Refills: 0 | Status: SHIPPED | OUTPATIENT
Start: 2024-05-17

## 2024-05-21 ENCOUNTER — OFFICE VISIT (OUTPATIENT)
Dept: FAMILY MEDICINE CLINIC | Facility: CLINIC | Age: 64
End: 2024-05-21
Payer: MEDICARE

## 2024-05-21 VITALS
RESPIRATION RATE: 18 BRPM | HEART RATE: 96 BPM | SYSTOLIC BLOOD PRESSURE: 132 MMHG | WEIGHT: 130 LBS | OXYGEN SATURATION: 96 % | HEIGHT: 64 IN | TEMPERATURE: 97.5 F | BODY MASS INDEX: 22.2 KG/M2 | DIASTOLIC BLOOD PRESSURE: 70 MMHG

## 2024-05-21 DIAGNOSIS — M19.042 PRIMARY OSTEOARTHRITIS OF BOTH HANDS: ICD-10-CM

## 2024-05-21 DIAGNOSIS — M54.2 CERVICALGIA: Primary | ICD-10-CM

## 2024-05-21 DIAGNOSIS — M19.041 PRIMARY OSTEOARTHRITIS OF BOTH HANDS: ICD-10-CM

## 2024-05-21 PROCEDURE — 3075F SYST BP GE 130 - 139MM HG: CPT | Performed by: NURSE PRACTITIONER

## 2024-05-21 PROCEDURE — 3051F HG A1C>EQUAL 7.0%<8.0%: CPT | Performed by: NURSE PRACTITIONER

## 2024-05-21 PROCEDURE — 99203 OFFICE O/P NEW LOW 30 MIN: CPT | Performed by: NURSE PRACTITIONER

## 2024-05-21 PROCEDURE — 3078F DIAST BP <80 MM HG: CPT | Performed by: NURSE PRACTITIONER

## 2024-05-21 PROCEDURE — 1126F AMNT PAIN NOTED NONE PRSNT: CPT | Performed by: NURSE PRACTITIONER

## 2024-05-21 RX ORDER — CYCLOBENZAPRINE HCL 5 MG
5 TABLET ORAL 3 TIMES DAILY PRN
Qty: 90 TABLET | Refills: 0 | Status: SHIPPED | OUTPATIENT
Start: 2024-05-21

## 2024-05-22 NOTE — PROGRESS NOTES
"Chief Complaint  Pain (Pt c/o pain in arm,shoulder and neck )    Subjective        Rafiq Yost presents to [facility]  Pain      History of Present Illness  The patient presents for evaluation of arm pain.    The patient has been experiencing persistent arm pain since 2023, characterized by an ache that intermittently radiates up to her chest. The pain, initially localized to her thumb, has now extended to her shoulder blade, albeit minimally. She also reports transient weakness in her hand, which she suspects may be due to a pinched nerve. The arm pain, described as a dull ache, occasionally brings her to tears. She attempts to alleviate the pain by squeezing or rubbing the area. A specific area is painful upon palpation and rolling over. She also reports a sensation of tightness in her arm, which has been ongoing for some time. A steroid injection provided some relief, but the pain has since returned. She retired on 01/31/2024 and believes this may have contributed to the recurrence of her arm pain. She denies experiencing chest pain. The pain intensifies when she lies down at night. She denies pain in the elbow but reports weakness in her hand. She uses Advil for pain management. She sustained a wrist fracture in her 4th grade.    Objective   Vital Signs:  /70   Pulse 96   Temp 97.5 °F (36.4 °C)   Resp 18   Ht 162.6 cm (64.02\")   Wt 59 kg (130 lb)   SpO2 96%   BMI 22.30 kg/m²   Estimated body mass index is 22.3 kg/m² as calculated from the following:    Height as of this encounter: 162.6 cm (64.02\").    Weight as of this encounter: 59 kg (130 lb).       BMI is within normal parameters. No other follow-up for BMI required.      Physical Exam  Constitutional:       General: She is not in acute distress.     Appearance: She is well-developed. She is not diaphoretic.   Cardiovascular:      Rate and Rhythm: Normal rate and regular rhythm.      Heart sounds: Normal heart " sounds. No murmur heard.     No friction rub. No gallop.   Pulmonary:      Effort: Pulmonary effort is normal. No respiratory distress.      Breath sounds: Normal breath sounds. No wheezing or rales.   Musculoskeletal:      Cervical back: Neck supple.   Skin:     General: Skin is warm and dry.   Neurological:      Mental Status: She is alert and oriented to person, place, and time.       Physical Exam      Result Review :          Results  Laboratory Studies  A1c was 7.    Assessment & Plan  1. Arm and thumb pain.  The patient's hand pain is likely attributable to arthritis. The shooting pain in her arm is likely originating from her neck region.  A steroid injection was proposed, however, the patient declined due to its potential to elevate her blood glucose levels. A prescription for Flexeril 5 mg, to be taken up to thrice daily as needed, was provided. The patient was advised to resume massage therapy. Over-the-counter Voltaren gel was suggested. Should her symptoms persist, an MRI of her neck may be considered.           Assessment and Plan     Diagnoses and all orders for this visit:    1. Cervicalgia (Primary)    2. Primary osteoarthritis of both hands    Other orders  -     cyclobenzaprine (FLEXERIL) 5 MG tablet; Take 1 tablet by mouth 3 (Three) Times a Day As Needed for Muscle Spasms.  Dispense: 90 tablet; Refill: 0             Follow Up     No follow-ups on file.  Patient was given instructions and counseling regarding her condition or for health maintenance advice. Please see specific information pulled into the AVS if appropriate.       Patient or patient representative verbalized consent for the use of Ambient Listening during the visit with  TOMMY Milligan for chart documentation. 5/22/2024  18:08 EDT  Answers submitted by the patient for this visit:  Other (Submitted on 5/14/2024)  Please describe your symptoms.: need check up  Have you had these symptoms before?: Yes  How long have you been  having these symptoms?: Greater than 2 weeks  Please list any medications you are currently taking for this condition.: none  Please describe any probable cause for these symptoms. : arm hurts sometimes  Primary Reason for Visit (Submitted on 5/14/2024)  What is the primary reason for your visit?: Other

## 2024-07-09 ENCOUNTER — OFFICE VISIT (OUTPATIENT)
Dept: SURGERY | Facility: CLINIC | Age: 64
End: 2024-07-09
Payer: MEDICARE

## 2024-07-09 VITALS
SYSTOLIC BLOOD PRESSURE: 156 MMHG | HEIGHT: 64 IN | WEIGHT: 128.4 LBS | DIASTOLIC BLOOD PRESSURE: 90 MMHG | BODY MASS INDEX: 21.92 KG/M2

## 2024-07-09 DIAGNOSIS — D03.62 MELANOMA IN SITU OF LEFT UPPER EXTREMITY INCLUDING SHOULDER: Primary | ICD-10-CM

## 2024-07-09 PROCEDURE — 99203 OFFICE O/P NEW LOW 30 MIN: CPT | Performed by: SURGERY

## 2024-07-09 PROCEDURE — 3077F SYST BP >= 140 MM HG: CPT | Performed by: SURGERY

## 2024-07-09 PROCEDURE — 11601 EXC TR-EXT MAL+MARG 0.6-1 CM: CPT | Performed by: SURGERY

## 2024-07-09 PROCEDURE — 1159F MED LIST DOCD IN RCRD: CPT | Performed by: SURGERY

## 2024-07-09 PROCEDURE — 1160F RVW MEDS BY RX/DR IN RCRD: CPT | Performed by: SURGERY

## 2024-07-09 PROCEDURE — 3080F DIAST BP >= 90 MM HG: CPT | Performed by: SURGERY

## 2024-07-09 NOTE — LETTER
July 9, 2024     Chance Taylor MD     Patient: Rafiq Yost   YOB: 1960   Date of Visit: 7/9/2024     Dear Chance Taylor MD:       Thank you for referring Rafiq Yost to me for evaluation. Below are the relevant portions of my assessment and plan of care.    If you have questions, please do not hesitate to call me. I look forward to following Rafiq along with you.         Sincerely,        Joseph Montaño Jr., MD        CC:   No Recipients    Joseph Montaño Jr., MD  07/09/24 1159  Sign when Signing Visit  Subjective  Rafiq Yost is a 64 y.o. female who presents to the office in surgical consultation from Chance Taylor MD and Nicki Rogers APRN for melanoma in situ of the left arm.    History of present illness  The patient was recently seen by her dermatologist for an odd shaped pigmented lesion over the left upper extremity.  She underwent a biopsy and this returned as a malignant melanoma in situ.  She was sent to our office for treatment.    He has not had any pain at the site of the biopsy.  She has not had any drainage.    Review of Systems   Constitutional:  Negative for fatigue and fever.   Respiratory:  Negative for chest tightness and shortness of breath.    Cardiovascular:  Negative for chest pain and palpitations.   Gastrointestinal:  Negative for abdominal pain and nausea.     Past Medical History:   Diagnosis Date   • Colon cancer screening 07/10/2017   • Hyperlipidemia    • Hypertension    • PONV (postoperative nausea and vomiting)    • Type 2 diabetes mellitus      Past Surgical History:   Procedure Laterality Date   • HYSTERECTOMY     • TEETH EXTRACTION Bilateral 05/2022     Family History   Problem Relation Age of Onset   • Diabetes Mother    • Hypertension Mother    • Thyroid disease Mother    • Kidney disease Mother         STAGE 4   • Cancer Mother         SKIN    • Atrial fibrillation Mother    • Heart attack Father    • Heart attack Sister    • Thyroid disease  Sister    • Cancer Sister         PITUITARY TUMOR    • Diabetes Brother    • Hypertension Brother    • Thyroid disease Sister    • Thyroid disease Sister    • Thyroid disease Sister    • Hypertension Brother    • Hyperlipidemia Brother    • Breast cancer Neg Hx      Social History     Socioeconomic History   • Marital status:    Tobacco Use   • Smoking status: Never   • Smokeless tobacco: Never   Vaping Use   • Vaping status: Never Used   Substance and Sexual Activity   • Alcohol use: No   • Drug use: No   • Sexual activity: Yes     Partners: Male       Objective  Physical Exam  Constitutional:       Appearance: Normal appearance. She is well-developed. She is not toxic-appearing.   Eyes:      General: No scleral icterus.  Pulmonary:      Effort: Pulmonary effort is normal. No respiratory distress.   Skin:     General: Skin is warm and dry.      Comments: There is a lesion of the left upper extremity in the region of the triceps that has a dark scab consistent with eschar and no surrounding cellulitis.   Neurological:      Mental Status: She is alert and oriented to person, place, and time.   Psychiatric:         Behavior: Behavior normal.         Thought Content: Thought content normal.         Judgment: Judgment normal.       BMI is within normal parameters. No other follow-up for BMI required.    Procedure  The left posterior upper extremity was prepped and draped in the standard surgical fashion.  The area of the lesion was carefully inspected and the scab was about 8 mm in size.  The entire area was infiltrated with 1% lidocaine with epinephrine.  An elliptical incision was made around the lesion for a nice safe margin with a scalpel carried through the skin into the subcutaneous tissue.  The subcutaneous tissue was divided sharply with a scalpel and elevated off of the deeper tissue.  The specimen was passed off for pathology and included the 8 mm central region and all the adjacent subcutaneous tissue.   The skin was closed with a 4-0 Monocryl in a subcuticular fashion followed by Mastisol and Steri-Strips.  The patient tolerated the procedure well.    Assessment & Plan    1.  Melanoma in situ: The patient has a melanoma in situ of the left upper extremity.  An excision of the melanoma in situ was performed in the office.  Local wound care was discussed with her.  She will follow-up on an as-needed basis.

## 2024-07-09 NOTE — PROGRESS NOTES
Ewelina Yost is a 64 y.o. female who presents to the office in surgical consultation from Chance Taylor MD and Nicki Rogers APRN for melanoma in situ of the left arm.    History of present illness  The patient was recently seen by her dermatologist for an odd shaped pigmented lesion over the left upper extremity.  She underwent a biopsy and this returned as a malignant melanoma in situ.  She was sent to our office for treatment.    He has not had any pain at the site of the biopsy.  She has not had any drainage.    Review of Systems   Constitutional:  Negative for fatigue and fever.   Respiratory:  Negative for chest tightness and shortness of breath.    Cardiovascular:  Negative for chest pain and palpitations.   Gastrointestinal:  Negative for abdominal pain and nausea.     Past Medical History:   Diagnosis Date    Colon cancer screening 07/10/2017    Hyperlipidemia     Hypertension     PONV (postoperative nausea and vomiting)     Type 2 diabetes mellitus      Past Surgical History:   Procedure Laterality Date    HYSTERECTOMY      TEETH EXTRACTION Bilateral 05/2022     Family History   Problem Relation Age of Onset    Diabetes Mother     Hypertension Mother     Thyroid disease Mother     Kidney disease Mother         STAGE 4    Cancer Mother         SKIN     Atrial fibrillation Mother     Heart attack Father     Heart attack Sister     Thyroid disease Sister     Cancer Sister         PITUITARY TUMOR     Diabetes Brother     Hypertension Brother     Thyroid disease Sister     Thyroid disease Sister     Thyroid disease Sister     Hypertension Brother     Hyperlipidemia Brother     Breast cancer Neg Hx      Social History     Socioeconomic History    Marital status:    Tobacco Use    Smoking status: Never    Smokeless tobacco: Never   Vaping Use    Vaping status: Never Used   Substance and Sexual Activity    Alcohol use: No    Drug use: No    Sexual activity: Yes     Partners: Male        Objective   Physical Exam  Constitutional:       Appearance: Normal appearance. She is well-developed. She is not toxic-appearing.   Eyes:      General: No scleral icterus.  Pulmonary:      Effort: Pulmonary effort is normal. No respiratory distress.   Skin:     General: Skin is warm and dry.      Comments: There is a lesion of the left upper extremity in the region of the triceps that has a dark scab consistent with eschar and no surrounding cellulitis.   Neurological:      Mental Status: She is alert and oriented to person, place, and time.   Psychiatric:         Behavior: Behavior normal.         Thought Content: Thought content normal.         Judgment: Judgment normal.       BMI is within normal parameters. No other follow-up for BMI required.    Procedure  The left posterior upper extremity was prepped and draped in the standard surgical fashion.  The area of the lesion was carefully inspected and the scab was about 8 mm in size.  The entire area was infiltrated with 1% lidocaine with epinephrine.  An elliptical incision was made around the lesion for a nice safe margin with a scalpel carried through the skin into the subcutaneous tissue.  The subcutaneous tissue was divided sharply with a scalpel and elevated off of the deeper tissue.  The specimen was passed off for pathology and included the 8 mm central region and all the adjacent subcutaneous tissue.  The skin was closed with a 4-0 Monocryl in a subcuticular fashion followed by Mastisol and Steri-Strips.  The patient tolerated the procedure well.    Assessment & Plan     1.  Melanoma in situ: The patient has a melanoma in situ of the left upper extremity.  An excision of the melanoma in situ was performed in the office.  Local wound care was discussed with her.  She will follow-up on an as-needed basis.

## 2024-07-11 LAB
DX PRELIMINARY: NORMAL
LAB AP CASE REPORT: NORMAL
LAB AP DIAGNOSIS COMMENT: NORMAL
PATH REPORT.GROSS SPEC: NORMAL

## 2024-07-23 DIAGNOSIS — I10 ESSENTIAL HYPERTENSION: ICD-10-CM

## 2024-07-23 DIAGNOSIS — E78.5 HYPERLIPIDEMIA, UNSPECIFIED HYPERLIPIDEMIA TYPE: ICD-10-CM

## 2024-07-23 RX ORDER — AMLODIPINE BESYLATE 2.5 MG/1
2.5 TABLET ORAL DAILY
Qty: 90 TABLET | Refills: 2 | Status: SHIPPED | OUTPATIENT
Start: 2024-07-23

## 2024-07-23 RX ORDER — ATORVASTATIN CALCIUM 80 MG/1
80 TABLET, FILM COATED ORAL DAILY
Qty: 90 TABLET | Refills: 2 | Status: SHIPPED | OUTPATIENT
Start: 2024-07-23

## 2024-07-23 NOTE — TELEPHONE ENCOUNTER
Rx Refill Note  Requested Prescriptions     Pending Prescriptions Disp Refills    atorvastatin (LIPITOR) 80 MG tablet [Pharmacy Med Name: ATORVASTATIN 80MG TABLETS] 90 tablet 2     Sig: TAKE 1 TABLET BY MOUTH DAILY.    amLODIPine (NORVASC) 2.5 MG tablet [Pharmacy Med Name: AMLODIPINE BESYLATE 2.5MG TABLETS] 90 tablet 2     Sig: TAKE 1 TABLET BY MOUTH DAILY.      Last office visit with prescribing clinician: 2/26/2024   Last telemedicine visit with prescribing clinician: Visit date not found   Next office visit with prescribing clinician: 8/26/2024                         Would you like a call back once the refill request has been completed: [] Yes [] No    If the office needs to give you a call back, can they leave a voicemail: [] Yes [] No    Cara Bills  07/23/24, 08:07 EDT

## 2024-07-30 LAB
DX PRELIMINARY: NORMAL
LAB AP CASE REPORT: NORMAL
LAB AP DIAGNOSIS COMMENT: NORMAL
PATH REPORT.FINAL DX SPEC: NORMAL
PATH REPORT.GROSS SPEC: NORMAL

## 2024-08-08 ENCOUNTER — TELEPHONE (OUTPATIENT)
Dept: ENDOCRINOLOGY | Age: 64
End: 2024-08-08

## 2024-08-08 NOTE — TELEPHONE ENCOUNTER
[facility] received a fax that requires your attention. The document has been indexed to the patient’s chart for your review.    Documents Description: EXTERNAL MEDICAL RECORDS-08/05/2024    Name of Sender: EVA PARSON    Date Indexed: 08/08/2024

## 2024-08-12 RX ORDER — IRBESARTAN 150 MG/1
150 TABLET ORAL DAILY
Qty: 90 TABLET | Refills: 2 | Status: SHIPPED | OUTPATIENT
Start: 2024-08-12

## 2024-08-12 NOTE — TELEPHONE ENCOUNTER
Rx Refill Note  Requested Prescriptions     Pending Prescriptions Disp Refills    irbesartan (AVAPRO) 150 MG tablet [Pharmacy Med Name: IRBESARTAN 150MG TABLETS] 90 tablet 2     Sig: TAKE 1 TABLET BY MOUTH DAILY.      Last office visit with prescribing clinician: 2/26/2024   Last telemedicine visit with prescribing clinician: Visit date not found   Next office visit with prescribing clinician: 8/26/2024                         Would you like a call back once the refill request has been completed: [] Yes [] No    If the office needs to give you a call back, can they leave a voicemail: [] Yes [] No    Cara Bills  08/12/24, 12:48 EDT

## 2024-08-26 ENCOUNTER — OFFICE VISIT (OUTPATIENT)
Dept: ENDOCRINOLOGY | Age: 64
End: 2024-08-26
Payer: MEDICARE

## 2024-08-26 VITALS
OXYGEN SATURATION: 99 % | BODY MASS INDEX: 22.31 KG/M2 | WEIGHT: 130 LBS | TEMPERATURE: 96.6 F | DIASTOLIC BLOOD PRESSURE: 80 MMHG | HEART RATE: 99 BPM | SYSTOLIC BLOOD PRESSURE: 132 MMHG

## 2024-08-26 DIAGNOSIS — E78.5 HYPERLIPIDEMIA, UNSPECIFIED HYPERLIPIDEMIA TYPE: ICD-10-CM

## 2024-08-26 DIAGNOSIS — Z79.4 TYPE 2 DIABETES MELLITUS WITHOUT COMPLICATION, WITH LONG-TERM CURRENT USE OF INSULIN: Primary | ICD-10-CM

## 2024-08-26 DIAGNOSIS — E11.9 TYPE 2 DIABETES MELLITUS WITHOUT COMPLICATION, WITH LONG-TERM CURRENT USE OF INSULIN: Primary | ICD-10-CM

## 2024-08-26 DIAGNOSIS — M85.89 OSTEOPENIA OF MULTIPLE SITES: ICD-10-CM

## 2024-08-26 DIAGNOSIS — I10 ESSENTIAL HYPERTENSION: ICD-10-CM

## 2024-08-26 DIAGNOSIS — E55.9 VITAMIN D DEFICIENCY: ICD-10-CM

## 2024-08-26 DIAGNOSIS — Z79.4 TYPE 2 DIABETES MELLITUS WITHOUT COMPLICATION, WITH LONG-TERM CURRENT USE OF INSULIN: ICD-10-CM

## 2024-08-26 DIAGNOSIS — E11.9 TYPE 2 DIABETES MELLITUS WITHOUT COMPLICATION, WITH LONG-TERM CURRENT USE OF INSULIN: ICD-10-CM

## 2024-08-26 LAB
25(OH)D3+25(OH)D2 SERPL-MCNC: 54.6 NG/ML (ref 30–100)
ALBUMIN SERPL-MCNC: 4.4 G/DL (ref 3.5–5.2)
ALBUMIN/GLOB SERPL: 1.8 G/DL
ALP SERPL-CCNC: 117 U/L (ref 39–117)
ALT SERPL-CCNC: 13 U/L (ref 1–33)
AST SERPL-CCNC: 17 U/L (ref 1–32)
BILIRUB SERPL-MCNC: 0.5 MG/DL (ref 0–1.2)
BUN SERPL-MCNC: 6 MG/DL (ref 8–23)
BUN/CREAT SERPL: 9 (ref 7–25)
CALCIUM SERPL-MCNC: 9.8 MG/DL (ref 8.6–10.5)
CHLORIDE SERPL-SCNC: 102 MMOL/L (ref 98–107)
CHOLEST SERPL-MCNC: 152 MG/DL (ref 0–200)
CO2 SERPL-SCNC: 26.8 MMOL/L (ref 22–29)
CREAT SERPL-MCNC: 0.67 MG/DL (ref 0.57–1)
EGFRCR SERPLBLD CKD-EPI 2021: 97.1 ML/MIN/1.73
GLOBULIN SER CALC-MCNC: 2.4 GM/DL
GLUCOSE SERPL-MCNC: 136 MG/DL (ref 65–99)
HBA1C MFR BLD: 6.6 % (ref 4.8–5.6)
HDLC SERPL-MCNC: 46 MG/DL (ref 40–60)
IMP & REVIEW OF LAB RESULTS: NORMAL
LDLC SERPL CALC-MCNC: 85 MG/DL (ref 0–100)
POTASSIUM SERPL-SCNC: 4.7 MMOL/L (ref 3.5–5.2)
PROT SERPL-MCNC: 6.8 G/DL (ref 6–8.5)
SODIUM SERPL-SCNC: 140 MMOL/L (ref 136–145)
TRIGL SERPL-MCNC: 119 MG/DL (ref 0–150)
TSH SERPL DL<=0.005 MIU/L-ACNC: 2.1 UIU/ML (ref 0.27–4.2)
VIT B12 SERPL-MCNC: >2000 PG/ML (ref 211–946)
VLDLC SERPL CALC-MCNC: 21 MG/DL (ref 5–40)

## 2024-08-26 RX ORDER — DAPAGLIFLOZIN 10 MG/1
10 TABLET, FILM COATED ORAL DAILY
Qty: 90 TABLET | Refills: 2 | Status: SHIPPED | OUTPATIENT
Start: 2024-08-26

## 2024-08-26 RX ORDER — GLIMEPIRIDE 4 MG/1
4 TABLET ORAL 2 TIMES DAILY
Qty: 180 TABLET | Refills: 2 | Status: SHIPPED | OUTPATIENT
Start: 2024-08-26

## 2024-08-26 RX ORDER — DAPAGLIFLOZIN 10 MG/1
10 TABLET, FILM COATED ORAL DAILY
Qty: 90 TABLET | Refills: 2 | OUTPATIENT
Start: 2024-08-26

## 2024-08-26 RX ORDER — DULAGLUTIDE 4.5 MG/.5ML
4.5 INJECTION, SOLUTION SUBCUTANEOUS WEEKLY
Qty: 6 ML | Refills: 2 | Status: SHIPPED | OUTPATIENT
Start: 2024-08-26

## 2024-08-26 NOTE — PROGRESS NOTES
Ewelina Yost is a 64 y.o. female.     History of Present Illness     She has type 2 diabetes mellitus and is on glimepiride 4 mg twice daily, Trulicity 4.5 mg weekly, metformin  mg 2 tabs BID, and Farxiga 10 mg/day.   She has been using Ozempic 0.5 mg weekly while Trulicity was not available.  She has no significant weight change in 2/24.  Her last meal was last night.     Her last eye examination was in Aug 2024.  She has no retinopathy.  She has early cataracts.  Urine microalbumin is normal in 2/24.  She denies numbness, tingling or burning in her hands or feet.     She has hyperlipidemia and is on Lipitor 80 mg/day.  She denies myalgia.     She has hypertension and is on irbesartan 150 mg once a day and amlodipine 2.5 mg/day.  She denies chest pain or shortness of breath.     She has osteopenia on bone density done in August 2022 with a slight decrease compared to 2019.  Her 10-year risk for major osteoporotic fracture is 16% and of hip fracture is 0.9%.  She has vitamin D deficiency and is on vitamin D3 2000 units/day.  She exercises regularly.       The following portions of the patient's history were reviewed and updated as appropriate: allergies, current medications, past family history, past medical history, past social history, past surgical history, and problem list.    Review of Systems   Eyes:  Negative for visual disturbance.   Respiratory:  Negative for shortness of breath.    Cardiovascular:  Negative for chest pain and palpitations.   Gastrointestinal: Negative.    Genitourinary: Negative.    Musculoskeletal:  Negative for myalgias.   Neurological:  Negative for numbness.     Vitals:    08/26/24 0812   BP: 132/80   Pulse: 99   Temp: 96.6 °F (35.9 °C)   TempSrc: Temporal   SpO2: 99%   Weight: 59 kg (130 lb)      Objective   Physical Exam  Constitutional:       General: She is not in acute distress.     Appearance: Normal appearance. She is not ill-appearing, toxic-appearing or  diaphoretic.   Eyes:      General: No scleral icterus.        Right eye: No discharge.         Left eye: No discharge.   Neck:      Vascular: No carotid bruit.   Cardiovascular:      Rate and Rhythm: Normal rate and regular rhythm.      Heart sounds: No murmur heard.     No friction rub.   Pulmonary:      Breath sounds: Normal breath sounds. No rales.   Chest:      Chest wall: No tenderness.   Abdominal:      Palpations: Abdomen is soft.      Tenderness: There is no right CVA tenderness or left CVA tenderness.   Musculoskeletal:      Right lower leg: No edema.      Left lower leg: No edema.   Lymphadenopathy:      Cervical: No cervical adenopathy.   Neurological:      Mental Status: She is alert and oriented to person, place, and time.       Office Visit on 07/09/2024   Component Date Value Ref Range Status    Case Report 07/09/2024    Final                    Value:Surgical Pathology Report                         Case: [id number]                                  Authorizing Provider:  Joseph Montaño Jr., MD    Collected:           07/09/2024 11:05 AM          Ordering Location:     [facility]     Received:            07/09/2024 08:22 PM                                 GROUP GENERAL SURGERY                                                        Pathologist:           Mari Arredondo MD                                                          Specimen:    Arm, Left                                                                                  Final Diagnosis 07/09/2024    Final                    Value:This result contains rich text formatting which cannot be displayed here.    Preliminary Diagnosis 07/09/2024    Final                    Value:This result contains rich text formatting which cannot be displayed here.    Comment 07/09/2024    Final                    Value:This result contains rich text formatting which cannot be displayed here.    Gross Description 07/09/2024    Final                     Value:This result contains rich text formatting which cannot be displayed here.     Assessment & Plan   Diagnoses and all orders for this visit:    1. Type 2 diabetes mellitus without complication, with long-term current use of insulin (Primary)  -     dapagliflozin Propanediol (Farxiga) 10 MG tablet; Take 10 mg by mouth Daily. May dispense generic or brand name preparation whichever is covered by insurance and affordable to the patient.  Indications: Type 2 Diabetes  Dispense: 90 tablet; Refill: 2  -     glimepiride (AMARYL) 4 MG tablet; Take 1 tablet by mouth 2 (Two) Times a Day. Indications: Type 2 Diabetes  Dispense: 180 tablet; Refill: 2  -     Comprehensive Metabolic Panel  -     Hemoglobin A1c  -     Vitamin B12  -     TSH Rfx On Abnormal To Free T4    2. Osteopenia of multiple sites  -     DEXA Bone Density Axial; Future  -     Vitamin D,25-Hydroxy    3. Hyperlipidemia, unspecified hyperlipidemia type  -     Lipid Panel    4. Essential hypertension  -     Comprehensive Metabolic Panel    5. Vitamin D deficiency  -     Vitamin D,25-Hydroxy    Other orders  -     Dulaglutide (Trulicity) 4.5 MG/0.5ML solution pen-injector; Inject 0.5 mL under the skin into the appropriate area as directed 1 (One) Time Per Week.  Dispense: 6 mL; Refill: 2      Restart Trulicity 4.5 mg weekly.  Continue glimepiride 4 mg twice a day, metformin  mg 2 tablets twice a day and Farxiga 10 mg/day.    Continue Lipitor 80 mg/day.    Continue irbesartan and amlodipine.    Continue vitamin D3 2000 units/day.  Schedule bone density.  Walk for 30 minutes 3-4 times a week.    Flu vaccine this fall.    Copy of my note sent to Nicki Rogers NP.    RTC 4 mos.

## 2024-08-26 NOTE — TELEPHONE ENCOUNTER
Rx Refill Note  Requested Prescriptions     Pending Prescriptions Disp Refills    Farxiga 10 MG tablet 90 tablet 2     Sig: Take 10 mg by mouth Daily. Indications: Type 2 Diabetes      Last office visit with prescribing clinician: 8/26/2024   Last telemedicine visit with prescribing clinician: Visit date not found   Next office visit with prescribing clinician: 1/3/2025                         Would you like a call back once the refill request has been completed: [] Yes [] No    If the office needs to give you a call back, can they leave a voicemail: [] Yes [] No    Melissa Hendrix MA  08/26/24, 10:24 EDT

## 2024-09-02 NOTE — PROGRESS NOTES
Hemoglobin A1c improved at 6.6%.  Diabetes is well-controlled.  Vitamin B12 greater than 2000 pg/mL.  May decrease vitamin B12 to 500 mcg daily.  LDL 85.  HDL 46.  Continue Lipitor 80 mg/day.  Normal thyroid function test.  Normal vitamin D.  Continue vitamin D3 2000 units/day.  Copy of labs sent to Nicki Rogers NP.  Please notify patient of results and instructions.

## 2024-09-23 ENCOUNTER — PATIENT MESSAGE (OUTPATIENT)
Dept: ENDOCRINOLOGY | Age: 64
End: 2024-09-23
Payer: COMMERCIAL

## 2024-09-23 DIAGNOSIS — E11.9 TYPE 2 DIABETES MELLITUS WITHOUT COMPLICATION, WITH LONG-TERM CURRENT USE OF INSULIN: ICD-10-CM

## 2024-09-23 DIAGNOSIS — Z79.4 TYPE 2 DIABETES MELLITUS WITHOUT COMPLICATION, WITH LONG-TERM CURRENT USE OF INSULIN: ICD-10-CM

## 2024-09-23 RX ORDER — GLIMEPIRIDE 4 MG/1
4 TABLET ORAL 2 TIMES DAILY
Qty: 180 TABLET | Refills: 2 | OUTPATIENT
Start: 2024-09-23

## 2024-09-25 DIAGNOSIS — E11.9 TYPE 2 DIABETES MELLITUS WITHOUT COMPLICATION, WITH LONG-TERM CURRENT USE OF INSULIN: ICD-10-CM

## 2024-09-25 DIAGNOSIS — Z79.4 TYPE 2 DIABETES MELLITUS WITHOUT COMPLICATION, WITH LONG-TERM CURRENT USE OF INSULIN: ICD-10-CM

## 2024-09-25 RX ORDER — GLIMEPIRIDE 4 MG/1
4 TABLET ORAL 2 TIMES DAILY
Qty: 180 TABLET | Refills: 2 | Status: CANCELLED | OUTPATIENT
Start: 2024-09-25

## 2024-09-26 ENCOUNTER — HOSPITAL ENCOUNTER (OUTPATIENT)
Facility: HOSPITAL | Age: 64
Discharge: HOME | End: 2024-09-26
Admitting: INTERNAL MEDICINE
Payer: MEDICARE

## 2024-09-26 DIAGNOSIS — M85.89 OSTEOPENIA OF MULTIPLE SITES: ICD-10-CM

## 2024-09-26 PROCEDURE — 77080 DXA BONE DENSITY AXIAL: CPT

## 2024-11-18 DIAGNOSIS — E11.9 TYPE 2 DIABETES MELLITUS WITHOUT COMPLICATION, WITH LONG-TERM CURRENT USE OF INSULIN: ICD-10-CM

## 2024-11-18 DIAGNOSIS — Z79.4 TYPE 2 DIABETES MELLITUS WITHOUT COMPLICATION, WITH LONG-TERM CURRENT USE OF INSULIN: ICD-10-CM

## 2024-11-18 RX ORDER — METFORMIN HYDROCHLORIDE 500 MG/1
1000 TABLET, EXTENDED RELEASE ORAL 2 TIMES DAILY WITH MEALS
Qty: 360 TABLET | Refills: 2 | Status: SHIPPED | OUTPATIENT
Start: 2024-11-18

## 2024-11-18 NOTE — TELEPHONE ENCOUNTER
Rx Refill Note  Requested Prescriptions     Pending Prescriptions Disp Refills    metFORMIN ER (GLUCOPHAGE-XR) 500 MG 24 hr tablet [Pharmacy Med Name: METFORMIN ER 500MG 24HR TABS] 360 tablet 2     Sig: TAKE 2 TABLETS BY MOUTH TWICE DAILY WITH MEALS      Last office visit with prescribing clinician: 8/26/2024   Last telemedicine visit with prescribing clinician: Visit date not found   Next office visit with prescribing clinician: 1/3/2025                         Would you like a call back once the refill request has been completed: [] Yes [] No    If the office needs to give you a call back, can they leave a voicemail: [] Yes [] No    Cara Bills  11/18/24, 08:15 EST

## 2025-01-03 ENCOUNTER — PATIENT MESSAGE (OUTPATIENT)
Dept: ENDOCRINOLOGY | Age: 65
End: 2025-01-03

## 2025-01-03 ENCOUNTER — OFFICE VISIT (OUTPATIENT)
Dept: ENDOCRINOLOGY | Age: 65
End: 2025-01-03
Payer: MEDICARE

## 2025-01-03 VITALS
OXYGEN SATURATION: 99 % | HEIGHT: 64 IN | HEART RATE: 86 BPM | BODY MASS INDEX: 22.77 KG/M2 | DIASTOLIC BLOOD PRESSURE: 78 MMHG | SYSTOLIC BLOOD PRESSURE: 108 MMHG | WEIGHT: 133.4 LBS | TEMPERATURE: 97.5 F

## 2025-01-03 DIAGNOSIS — E11.9 TYPE 2 DIABETES MELLITUS WITHOUT COMPLICATION, WITH LONG-TERM CURRENT USE OF INSULIN: Primary | ICD-10-CM

## 2025-01-03 DIAGNOSIS — E55.9 VITAMIN D DEFICIENCY: ICD-10-CM

## 2025-01-03 DIAGNOSIS — M85.89 OSTEOPENIA OF MULTIPLE SITES: ICD-10-CM

## 2025-01-03 DIAGNOSIS — E78.5 HYPERLIPIDEMIA, UNSPECIFIED HYPERLIPIDEMIA TYPE: ICD-10-CM

## 2025-01-03 DIAGNOSIS — Z79.4 TYPE 2 DIABETES MELLITUS WITHOUT COMPLICATION, WITH LONG-TERM CURRENT USE OF INSULIN: Primary | ICD-10-CM

## 2025-01-03 DIAGNOSIS — I10 ESSENTIAL HYPERTENSION: ICD-10-CM

## 2025-01-03 LAB
25(OH)D3+25(OH)D2 SERPL-MCNC: 53.3 NG/ML (ref 30–100)
ALBUMIN SERPL-MCNC: 4.5 G/DL (ref 3.5–5.2)
ALBUMIN/GLOB SERPL: 2 G/DL
ALP SERPL-CCNC: 123 U/L (ref 39–117)
ALT SERPL-CCNC: 14 U/L (ref 1–33)
AST SERPL-CCNC: 16 U/L (ref 1–32)
BILIRUB SERPL-MCNC: 0.4 MG/DL (ref 0–1.2)
BUN SERPL-MCNC: 4 MG/DL (ref 8–23)
BUN/CREAT SERPL: 5.6 (ref 7–25)
CALCIUM SERPL-MCNC: 10.6 MG/DL (ref 8.6–10.5)
CHLORIDE SERPL-SCNC: 100 MMOL/L (ref 98–107)
CHOLEST SERPL-MCNC: 133 MG/DL (ref 0–200)
CO2 SERPL-SCNC: 27.8 MMOL/L (ref 22–29)
CREAT SERPL-MCNC: 0.72 MG/DL (ref 0.57–1)
EGFRCR SERPLBLD CKD-EPI 2021: 92.9 ML/MIN/1.73
GLOBULIN SER CALC-MCNC: 2.3 GM/DL
GLUCOSE SERPL-MCNC: 94 MG/DL (ref 65–99)
HBA1C MFR BLD: 6.8 % (ref 4.8–5.6)
HDLC SERPL-MCNC: 39 MG/DL (ref 40–60)
IMP & REVIEW OF LAB RESULTS: NORMAL
LDLC SERPL CALC-MCNC: 71 MG/DL (ref 0–100)
POTASSIUM SERPL-SCNC: 4.3 MMOL/L (ref 3.5–5.2)
PROT SERPL-MCNC: 6.8 G/DL (ref 6–8.5)
SODIUM SERPL-SCNC: 138 MMOL/L (ref 136–145)
TRIGL SERPL-MCNC: 128 MG/DL (ref 0–150)
TSH SERPL DL<=0.005 MIU/L-ACNC: 1.78 UIU/ML (ref 0.27–4.2)
VLDLC SERPL CALC-MCNC: 23 MG/DL (ref 5–40)

## 2025-01-03 RX ORDER — GLIMEPIRIDE 4 MG/1
TABLET ORAL
Status: SHIPPED
Start: 2025-01-03

## 2025-01-03 RX ORDER — AMLODIPINE BESYLATE 2.5 MG/1
2.5 TABLET ORAL DAILY
Qty: 90 TABLET | Refills: 2 | Status: SHIPPED | OUTPATIENT
Start: 2025-01-03

## 2025-01-03 RX ORDER — BLOOD SUGAR DIAGNOSTIC
1 STRIP MISCELLANEOUS 2 TIMES DAILY
Qty: 200 EACH | Refills: 3 | Status: SHIPPED | OUTPATIENT
Start: 2025-01-03

## 2025-01-03 RX ORDER — BLOOD SUGAR DIAGNOSTIC
1 STRIP MISCELLANEOUS 2 TIMES DAILY
Qty: 200 EACH | Refills: 1 | Status: SHIPPED | OUTPATIENT
Start: 2025-01-03 | End: 2025-01-03 | Stop reason: SDUPTHER

## 2025-01-03 NOTE — PROGRESS NOTES
Ewelina Yost is a 64 y.o. female.     History of Present Illness     She has type 2 diabetes mellitus and is on glimepiride 4 mg twice daily, Trulicity 4.5 mg weekly, and metformin  mg 2 tabs BID.  She has been off Farxiga 10 mg for 3 months because of high co-pay.  She has gained 3 pounds since August 2024.  Fasting glucose .  Random glucose 126-158.  She has hypoglycemia in the early morning once a week.  Her last meal was last night.     Her last eye examination was in Aug 2024.  She has no retinopathy.  She has early cataracts.  Urine microalbumin is normal in 2/24.  She denies numbness, tingling or burning in her hands or feet.     She has hyperlipidemia and is on Lipitor 80 mg/day.  She denies myalgia.     She has hypertension and is on irbesartan 150 mg once a day and amlodipine 2.5 mg/day.  She denies chest pain or shortness of breath.     She has osteopenia on bone density done in August 2022 with a slight decrease compared to 2019.  Bone density done in September 2024 showed osteopenia.  When compared to prior examination of August 2022, there has been significant decrease in the left hip by 3.6% in the lumbar spine by 2.6%.  10-year risk for major osteoporotic fracture is 17% and of hip fracture is 1.0%.    She has vitamin D deficiency and is on vitamin D3 2000 units/day.  She exercises regularly.       The following portions of the patient's history were reviewed and updated as appropriate: allergies, current medications, past family history, past medical history, past social history, past surgical history, and problem list.    Review of Systems   Eyes:  Negative for visual disturbance.   Respiratory:  Negative for shortness of breath and wheezing.    Cardiovascular:  Negative for chest pain and palpitations.   Gastrointestinal: Negative.    Endocrine: Negative for cold intolerance and heat intolerance.   Genitourinary: Negative.    Musculoskeletal:  Negative for neck pain.  "  Neurological:  Negative for numbness.     Vitals:    01/03/25 0834   BP: 108/78   Pulse: 86   Temp: 97.5 °F (36.4 °C)   TempSrc: Oral   SpO2: 99%   Weight: 60.5 kg (133 lb 6.4 oz)   Height: 162.6 cm (64.02\")      Objective   Physical Exam  Constitutional:       General: She is not in acute distress.     Appearance: Normal appearance. She is obese. She is not ill-appearing, toxic-appearing or diaphoretic.   Eyes:      General: No scleral icterus.        Right eye: No discharge.         Left eye: No discharge.      Extraocular Movements: Extraocular movements intact.   Neck:      Vascular: No carotid bruit.   Cardiovascular:      Rate and Rhythm: Normal rate and regular rhythm.      Heart sounds: Normal heart sounds. No murmur heard.  Pulmonary:      Breath sounds: Normal breath sounds. No rales.   Chest:      Chest wall: No tenderness.   Abdominal:      General: Bowel sounds are normal.      Palpations: Abdomen is soft.      Tenderness: There is no right CVA tenderness or left CVA tenderness.   Musculoskeletal:      Right lower leg: No edema.      Left lower leg: No edema.      Comments: Feet warm.  No cyanosis or pedal edema.  No clubbing.  No plantar ulcers.   Lymphadenopathy:      Cervical: No cervical adenopathy.   Neurological:      Mental Status: She is alert and oriented to person, place, and time.   Psychiatric:         Mood and Affect: Mood normal.       Office Visit on 08/26/2024   Component Date Value Ref Range Status    Glucose 08/26/2024 136 (H)  65 - 99 mg/dL Final    BUN 08/26/2024 6 (L)  8 - 23 mg/dL Final    Creatinine 08/26/2024 0.67  0.57 - 1.00 mg/dL Final    EGFR Result 08/26/2024 97.1  >60.0 mL/min/1.73 Final    Comment: GFR Normal >60  Chronic Kidney Disease <60  Kidney Failure <15      BUN/Creatinine Ratio 08/26/2024 9.0  7.0 - 25.0 Final    Sodium 08/26/2024 140  136 - 145 mmol/L Final    Potassium 08/26/2024 4.7  3.5 - 5.2 mmol/L Final    Chloride 08/26/2024 102  98 - 107 mmol/L Final    " Total CO2 08/26/2024 26.8  22.0 - 29.0 mmol/L Final    Calcium 08/26/2024 9.8  8.6 - 10.5 mg/dL Final    Total Protein 08/26/2024 6.8  6.0 - 8.5 g/dL Final    Albumin 08/26/2024 4.4  3.5 - 5.2 g/dL Final    Globulin 08/26/2024 2.4  gm/dL Final    A/G Ratio 08/26/2024 1.8  g/dL Final    Total Bilirubin 08/26/2024 0.5  0.0 - 1.2 mg/dL Final    Alkaline Phosphatase 08/26/2024 117  39 - 117 U/L Final    AST (SGOT) 08/26/2024 17  1 - 32 U/L Final    ALT (SGPT) 08/26/2024 13  1 - 33 U/L Final    Hemoglobin A1C 08/26/2024 6.60 (H)  4.80 - 5.60 % Final    Comment: Hemoglobin A1C Ranges:  Increased Risk for Diabetes  5.7% to 6.4%  Diabetes                     >= 6.5%  Diabetic Goal                < 7.0%      Vitamin B-12 08/26/2024 >2000 (H)  211 - 946 pg/mL Final    Results may be falsely increased if patient taking Biotin.    Total Cholesterol 08/26/2024 152  0 - 200 mg/dL Final    Comment: Cholesterol Reference Ranges  (U.S. Department of Health and Human Services ATP III  Classifications)  Desirable          <200 mg/dL  Borderline High    200-239 mg/dL  High Risk          >240 mg/dL  Triglyceride Reference Ranges  (U.S. Department of Health and Human Services ATP III  Classifications)  Normal           <150 mg/dL  Borderline High  150-199 mg/dL  High             200-499 mg/dL  Very High        >500 mg/dL  HDL Reference Ranges  (U.S. Department of Health and Human Services ATP III  Classifications)  Low     <40 mg/dl (major risk factor for CHD)  High    >60 mg/dl ('negative' risk factor for CHD)  LDL Reference Ranges  (U.S. Department of Health and Human Services ATP III  Classifications)  Optimal          <100 mg/dL  Near Optimal     100-129 mg/dL  Borderline High  130-159 mg/dL  High             160-189 mg/dL  Very High        >189 mg/dL      Triglycerides 08/26/2024 119  0 - 150 mg/dL Final    HDL Cholesterol 08/26/2024 46  40 - 60 mg/dL Final    VLDL Cholesterol Caleb 08/26/2024 21  5 - 40 mg/dL Final    LDL Chol Calc  ([facility]) 08/26/2024 85  0 - 100 mg/dL Final    TSH 08/26/2024 2.100  0.270 - 4.200 uIU/mL Final    25 Hydroxy, Vitamin D 08/26/2024 54.6  30.0 - 100.0 ng/ml Final    Comment: Reference Range for Total Vitamin D 25(OH)  Deficiency <20.0 ng/mL  Insufficiency 21-29 ng/mL  Sufficiency  ng/mL  Toxicity >100 ng/ml      Interpretation 08/26/2024 Note   Final    Supplemental report is available.     Assessment & Plan   Diagnoses and all orders for this visit:    1. Type 2 diabetes mellitus without complication, with long-term current use of insulin (Primary)  -     Discontinue: glucose blood (FreeStyle InsuLinx Test) test strip; 1 each by Other route 2 (Two) Times a Day. Indications: Type 2 Diabetes  Dispense: 200 each; Refill: 1  -     Comprehensive Metabolic Panel  -     Hemoglobin A1c  -     Lipid Panel  -     TSH Rfx On Abnormal To Free T4  -     glimepiride (AMARYL) 4 MG tablet; 1 tablet every morning and 1/2 tablet every evening with meals  Indications: Type 2 Diabetes  -     glucose blood (FreeStyle InsuLinx Test) test strip; 1 each by Other route 2 (Two) Times a Day. Indications: Type 2 Diabetes  Dispense: 200 each; Refill: 3    2. Essential hypertension  -     amLODIPine (NORVASC) 2.5 MG tablet; Take 1 tablet by mouth Daily. Indications: High Blood Pressure  Dispense: 90 tablet; Refill: 2  -     Comprehensive Metabolic Panel    3. Hyperlipidemia, unspecified hyperlipidemia type  -     Lipid Panel  -     TSH Rfx On Abnormal To Free T4    4. Osteopenia of multiple sites  -     Vitamin D,25-Hydroxy    5. Vitamin D deficiency  -     Vitamin D,25-Hydroxy      Decrease glimepiride 4 mg to 1 tablet every morning and 1/2 tablet every evening.  Continue Trulicity 4.5 mg weekly, and metformin  mg 2 tablets twice a day.    Continue Lipitor 80 mg/day.    Continue irbesartan and amlodipine per Nicki Rogers NP.    Continue vitamin D3 2000 units/day.  Walk for 30 minutes 5 days a week.  Repeat bone density in August  2026.    Copy of my note sent to Nicki Rogers NP.    RTC 4 mos

## 2025-01-05 NOTE — PROGRESS NOTES
Calcium mildly elevated at 10.6 mg per DL.  Recheck with next follow-up.  Hemoglobin A1c 6.8%.  Diabetes is well-controlled.  LDL 71.  HDL 39.  Triglycerides 128.  Continue Lipitor 80 mg a day.  Normal thyroid function test.  Normal vitamin D at 53.3 ng/mL.  Copy of labs sent to Nicki Rogers NP and to patient through [employer].

## 2025-01-15 RX ORDER — SEMAGLUTIDE 0.68 MG/ML
INJECTION, SOLUTION SUBCUTANEOUS
Qty: 9 ML | Refills: 0 | OUTPATIENT
Start: 2025-01-15

## 2025-01-15 RX ORDER — CYCLOBENZAPRINE HCL 5 MG
5 TABLET ORAL 3 TIMES DAILY PRN
Qty: 90 TABLET | Refills: 0 | Status: SHIPPED | OUTPATIENT
Start: 2025-01-15

## 2025-01-15 NOTE — TELEPHONE ENCOUNTER
Rx Refill Note  Requested Prescriptions     Pending Prescriptions Disp Refills    Semaglutide,0.25 or 0.5MG/DOS, (Ozempic, 0.25 or 0.5 MG/DOSE,) 2 MG/3ML solution pen-injector [Pharmacy Med Name: OZEMPIC 0.25 OR 0.5MG DOS(2MG/3ML)] 9 mL 0     Sig: INJECT 0.25 MG UNDER THE SKIN WEEKLY FOR 4 WEEKS, THEN 0.5 MG WEEKLY THEREAFTER.      Last office visit with prescribing clinician: 1/3/2025   Last telemedicine visit with prescribing clinician: Visit date not found   Next office visit with prescribing clinician: 5/2/2025                         Would you like a call back once the refill request has been completed: [] Yes [] No    If the office needs to give you a call back, can they leave a voicemail: [] Yes [] No    Cara Bills  01/15/25, 10:15 EST

## 2025-01-15 NOTE — TELEPHONE ENCOUNTER
Rx Refill Note  Requested Prescriptions     Pending Prescriptions Disp Refills    cyclobenzaprine (FLEXERIL) 5 MG tablet [Pharmacy Med Name: CYCLOBENZAPRINE 5MG TABLETS] 90 tablet 0     Sig: TAKE 1 TABLET BY MOUTH THREE TIMES DAILY AS NEEDED FOR MUSCLE SPASMS      Last office visit with prescribing clinician: 5/21/2024   Last telemedicine visit with prescribing clinician: Visit date not found   Next office visit with prescribing clinician: Visit date not found                         Would you like a call back once the refill request has been completed: [] Yes [] No    If the office needs to give you a call back, can they leave a voicemail: [] Yes [] No    Nabil Sotelo MA  01/15/25, 10:09 EST

## 2025-02-06 RX ORDER — IRBESARTAN 150 MG/1
150 TABLET ORAL DAILY
Qty: 90 TABLET | Refills: 2 | Status: SHIPPED | OUTPATIENT
Start: 2025-02-06

## 2025-02-06 NOTE — TELEPHONE ENCOUNTER
Rx Refill Note  Requested Prescriptions     Pending Prescriptions Disp Refills    irbesartan (AVAPRO) 150 MG tablet [Pharmacy Med Name: IRBESARTAN 150MG TABLETS] 90 tablet 2     Sig: TAKE 1 TABLET BY MOUTH DAILY.      Last office visit with prescribing clinician: 1/3/2025   Last telemedicine visit with prescribing clinician: Visit date not found   Next office visit with prescribing clinician: 5/2/2025                         Would you like a call back once the refill request has been completed: [] Yes [] No    If the office needs to give you a call back, can they leave a voicemail: [] Yes [] No    Cara Bills  02/06/25, 08:10 EST

## 2025-02-27 ENCOUNTER — HOSPITAL ENCOUNTER (OUTPATIENT)
Dept: GENERAL RADIOLOGY | Facility: HOSPITAL | Age: 65
Discharge: HOME | End: 2025-02-27
Payer: MEDICARE

## 2025-02-27 ENCOUNTER — OFFICE VISIT (OUTPATIENT)
Dept: FAMILY MEDICINE CLINIC | Facility: CLINIC | Age: 65
End: 2025-02-27
Payer: MEDICARE

## 2025-02-27 VITALS
SYSTOLIC BLOOD PRESSURE: 134 MMHG | HEIGHT: 64 IN | WEIGHT: 135.7 LBS | BODY MASS INDEX: 23.17 KG/M2 | DIASTOLIC BLOOD PRESSURE: 78 MMHG | OXYGEN SATURATION: 99 % | HEART RATE: 111 BPM

## 2025-02-27 DIAGNOSIS — Z13.6 SCREENING FOR HEART DISEASE: ICD-10-CM

## 2025-02-27 DIAGNOSIS — M79.672 LEFT FOOT PAIN: ICD-10-CM

## 2025-02-27 DIAGNOSIS — M79.622 LEFT UPPER ARM PAIN: Primary | ICD-10-CM

## 2025-02-27 DIAGNOSIS — M79.622 LEFT UPPER ARM PAIN: ICD-10-CM

## 2025-02-27 DIAGNOSIS — Z12.31 ENCOUNTER FOR SCREENING MAMMOGRAM FOR MALIGNANT NEOPLASM OF BREAST: ICD-10-CM

## 2025-02-27 PROCEDURE — 73630 X-RAY EXAM OF FOOT: CPT

## 2025-02-27 PROCEDURE — 73060 X-RAY EXAM OF HUMERUS: CPT

## 2025-02-27 RX ORDER — MELOXICAM 15 MG/1
15 TABLET ORAL DAILY
Qty: 30 TABLET | Refills: 0 | Status: SHIPPED | OUTPATIENT
Start: 2025-02-27

## 2025-03-02 NOTE — PROGRESS NOTES
Chief Complaint  Foot Injury (Left foot pain on side when walking)    Ewelina Yost presents to [facility]  Foot Injury       History of Present Illness  The patient presents for evaluation of foot pain, arm pain, and diabetes.    She reports experiencing foot pain that began approximately one week ago. The onset of the pain was sudden, occurring after a day of prolonged standing. She does not recall any specific injury or trauma to the foot. The pain is described as being less severe than initially experienced but intensifies with prolonged standing. She has been self-medicating with Advil, which provides some relief. There is no associated redness or warmth in the affected area. She expresses concern about her foot condition due to her diabetic status. She also suspects some degree of bone loss.    She continues to experience arm pain, which she describes as radiating from the shoulder down to the chest. The pain is severe and has been present for at least 4 to 5 years. She has attempted to manage the pain with a compression sleeve, which has provided some relief. She also reports a history of malignant melanoma in the arm, which was surgically removed. Despite reassurances that the cancer was contained, she remains concerned about potential metastasis. The pain is not constant but can be triggered by rushing or emotional distress. She also experiences weakness in her hands, which can last between 20 to 40 minutes. She has not undergone a cardiac workup recently. She reports a racing heart and elevated blood pressure during episodes of pain. She has limited range of motion in the arm due to a previous injury. She has received an injection in the shoulder in the past, which provided minimal relief. She is currently receiving massages to address a knot in the area.    Her diabetes is managed by Dr. Douglas, with her most recent A1c level recorded at 6.9. She is currently on  "Trulicity, metformin, and glimepiride.    She has not had a mammogram in years.    SOCIAL HISTORY  She retired in January 2024.    MEDICATIONS  Current: Advil, Trulicity, metformin, glimepiride    Objective   Vital Signs:  /78 (BP Location: Left arm, Patient Position: Sitting, Cuff Size: Adult)   Pulse 111   Ht 162.6 cm (64.02\")   Wt 61.6 kg (135 lb 11.2 oz)   SpO2 99%   BMI 23.28 kg/m²   Estimated body mass index is 23.28 kg/m² as calculated from the following:    Height as of this encounter: 162.6 cm (64.02\").    Weight as of this encounter: 61.6 kg (135 lb 11.2 oz).       BMI is within normal parameters. No other follow-up for BMI required.      Physical Exam  Vitals reviewed.   Constitutional:       General: She is not in acute distress.     Appearance: She is well-developed. She is not diaphoretic.   HENT:      Head: Normocephalic and atraumatic.      Right Ear: Tympanic membrane, ear canal and external ear normal.      Left Ear: Tympanic membrane, ear canal and external ear normal.      Nose: Nose normal.      Mouth/Throat:      Mouth: Mucous membranes are moist.      Pharynx: Oropharynx is clear. Uvula midline. No oropharyngeal exudate.   Eyes:      Conjunctiva/sclera: Conjunctivae normal.      Pupils: Pupils are equal, round, and reactive to light.   Cardiovascular:      Rate and Rhythm: Normal rate and regular rhythm.      Heart sounds: Normal heart sounds. No murmur heard.     No friction rub. No gallop.   Pulmonary:      Effort: Pulmonary effort is normal. No respiratory distress.      Breath sounds: Normal breath sounds. No wheezing or rales.   Abdominal:      General: Bowel sounds are normal. There is no distension.      Palpations: Abdomen is soft.      Tenderness: There is no abdominal tenderness.   Musculoskeletal:      Cervical back: Neck supple.   Lymphadenopathy:      Cervical: No cervical adenopathy.   Skin:     General: Skin is warm and dry.   Neurological:      Mental Status: She is " alert and oriented to person, place, and time.   Psychiatric:         Mood and Affect: Mood normal.       Physical Exam  Foot was examined.    Result Review :          Results  Laboratory Studies  A1c was 6.9.    Assessment & Plan  1. Foot pain.  The symptoms suggest a possible diagnosis of arthritis. An x-ray of the foot will be ordered to confirm the diagnosis. A prescription for meloxicam 15 mg daily has been provided for a duration of 30 days to manage the inflammation and associated pain. She has been advised against concurrent use of ibuprofen or Aleve while on meloxicam but may use Tylenol for breakthrough pain. The prescription will be sent to [facility]. Depending on the x-ray results, a referral to either sports medicine or orthopedics may be considered.    2. Arm pain.  The pain could potentially be attributed to arthritis. An x-ray of the arm will be ordered to assess the bone health. If the x-ray shows any abnormalities, further treatment options, including a possible referral to sports medicine or orthopedics, will be considered.    3. Diabetes mellitus.  Her diabetes is currently well-managed with an A1c of 6.9. She is on Trulicity, metformin, and glimepiride.    4. Health maintenance.  A mammogram has been ordered, and she has been advised to schedule it at [address]. She declined the pneumonia vaccine.    PROCEDURE  The patient has a history of malignant melanoma in the arm, which was surgically removed. She has also received a shoulder injection in the past.           Assessment and Plan     Diagnoses and all orders for this visit:    1. Left upper arm pain (Primary)  -     XR humerus left; Future    2. Left foot pain  -     XR Foot 3+ View Left; Future    3. Screening for heart disease  -     CT Cardiac Calcium Score Without Dye; Future    4. Encounter for screening mammogram for malignant neoplasm of breast  -     Mammo screening digital tomosynthesis bilateral w CAD;  Future    Other orders  -     meloxicam (MOBIC) 15 MG tablet; Take 1 tablet by mouth Daily.  Dispense: 30 tablet; Refill: 0             Follow Up     No follow-ups on file.  Patient was given instructions and counseling regarding her condition or for health maintenance advice. Please see specific information pulled into the AVS if appropriate.       Patient or patient representative verbalized consent for the use of Ambient Listening during the visit with  TOMMY Milligan for chart documentation. 3/2/2025  14:58 EST

## 2025-04-02 RX ORDER — MELOXICAM 15 MG/1
15 TABLET ORAL DAILY
Qty: 30 TABLET | Refills: 2 | Status: SHIPPED | OUTPATIENT
Start: 2025-04-02

## 2025-04-02 NOTE — TELEPHONE ENCOUNTER
Rx Refill Note  Requested Prescriptions     Pending Prescriptions Disp Refills    meloxicam (MOBIC) 15 MG tablet 30 tablet 0     Sig: Take 1 tablet by mouth Daily.      Last office visit with prescribing clinician: 2/27/2025   Last telemedicine visit with prescribing clinician: Visit date not found   Next office visit with prescribing clinician: Visit date not found                         Would you like a call back once the refill request has been completed: [] Yes [] No    If the office needs to give you a call back, can they leave a voicemail: [] Yes [] No    Sarthak Griffin MA  04/02/25, 11:18 EDT

## 2025-04-04 ENCOUNTER — HOSPITAL ENCOUNTER (OUTPATIENT)
Facility: HOSPITAL | Age: 65
Discharge: HOME | End: 2025-04-04
Admitting: NURSE PRACTITIONER

## 2025-04-04 DIAGNOSIS — Z13.6 SCREENING FOR HEART DISEASE: ICD-10-CM

## 2025-04-04 PROCEDURE — 75571 CT HRT W/O DYE W/CA TEST: CPT

## 2025-04-08 ENCOUNTER — OFFICE VISIT (OUTPATIENT)
Dept: SURGERY | Facility: CLINIC | Age: 65
End: 2025-04-08
Payer: MEDICARE

## 2025-04-08 VITALS
SYSTOLIC BLOOD PRESSURE: 148 MMHG | BODY MASS INDEX: 22.94 KG/M2 | OXYGEN SATURATION: 100 % | DIASTOLIC BLOOD PRESSURE: 86 MMHG | WEIGHT: 134.4 LBS | HEART RATE: 99 BPM | HEIGHT: 64 IN

## 2025-04-08 DIAGNOSIS — L72.3 SEBACEOUS CYST: Primary | ICD-10-CM

## 2025-04-08 NOTE — H&P (VIEW-ONLY)
Subjective   Rafiq Yost is a 65 y.o. female who presents to the office in surgical consultation from Chance Taylor MD and Nicki Rogers APRN for a soft tissue neoplasm of the base of the right neck.    History of present illness  The patient has a lesion at the base of her right neck along the lateral aspect that has been present for several years and was diagnosed as a sebaceous cyst.  It has slowly gotten larger and is intermittently symptomatic when pressure is applied.  There has never been trauma to the area.  It has never been infected and has never drained.    Review of Systems   Constitutional:  Negative for chills and fever.   Skin:  Negative for rash and wound.     Past Medical History:   Diagnosis Date    Colon cancer screening 07/10/2017    Hyperlipidemia     Hypertension     PONV (postoperative nausea and vomiting)     Type 2 diabetes mellitus      Past Surgical History:   Procedure Laterality Date    HYSTERECTOMY      TEETH EXTRACTION Bilateral 05/2022     Family History   Problem Relation Age of Onset    Diabetes Mother     Hypertension Mother     Thyroid disease Mother     Kidney disease Mother         STAGE 4    Cancer Mother         SKIN     Atrial fibrillation Mother     Heart attack Father     Heart attack Sister     Thyroid disease Sister     Cancer Sister         PITUITARY TUMOR     Thyroid disease Sister     Thyroid disease Sister     Thyroid disease Sister     Diabetes Brother     Hypertension Brother     Hypertension Brother     Hyperlipidemia Brother     Breast cancer Neg Hx      Social History     Socioeconomic History    Marital status:    Tobacco Use    Smoking status: Never    Smokeless tobacco: Never   Vaping Use    Vaping status: Never Used   Substance and Sexual Activity    Alcohol use: No    Drug use: No    Sexual activity: Yes     Partners: Male       Objective   Physical Exam  Constitutional:       Appearance: She is not ill-appearing or toxic-appearing.    Skin:     Comments: There is a 2-3 cm soft tissue neoplasm of the base of the right neck that is suspicious for a sebaceous cyst.  It is mobile with well-defined margins.   Neurological:      Mental Status: She is alert.   Psychiatric:         Behavior: Behavior is cooperative.       BMI is within normal parameters. No other follow-up for BMI required.      Assessment & Plan     1.  Soft tissue neoplasm: The patient has a soft tissue neoplasm of the right lateral neck near its base that has benign characteristics and is most suspicious for a sebaceous cyst.  It is large in size and is at risk of becoming infected.  This was discussed with her.  Based on the risk of infection, proceeding with an excision of the lesion is appropriate.  She will be scheduled for an excision of the soft tissue neoplasm.  The patient understands the indications, alternatives, risks, and benefits of the procedure and wishes to proceed.

## 2025-04-08 NOTE — LETTER
April 8, 2025     Chance Taylor MD     Patient: Rafiq Yost   YOB: 1960   Date of Visit: 4/8/2025     Dear Chance Taylor MD:       Thank you for referring Rafiq Yost to me for evaluation. Below are the relevant portions of my assessment and plan of care.    If you have questions, please do not hesitate to call me. I look forward to following Rafiq along with you.         Sincerely,        Joseph Montaño Jr., MD        CC: No Recipients    Joseph Montaño Jr., MD  04/08/25 1408  Sign when Signing Visit  Subjective  Rafiq Yost is a 65 y.o. female who presents to the office in surgical consultation from Chacne Taylor MD and Nicki Rogers APRN for a soft tissue neoplasm of the base of the right neck.    History of present illness  The patient has a lesion at the base of her right neck along the lateral aspect that has been present for several years and was diagnosed as a sebaceous cyst.  It has slowly gotten larger and is intermittently symptomatic when pressure is applied.  There has never been trauma to the area.  It has never been infected and has never drained.    Review of Systems   Constitutional:  Negative for chills and fever.   Skin:  Negative for rash and wound.     Past Medical History:   Diagnosis Date   • Colon cancer screening 07/10/2017   • Hyperlipidemia    • Hypertension    • PONV (postoperative nausea and vomiting)    • Type 2 diabetes mellitus      Past Surgical History:   Procedure Laterality Date   • HYSTERECTOMY     • TEETH EXTRACTION Bilateral 05/2022     Family History   Problem Relation Age of Onset   • Diabetes Mother    • Hypertension Mother    • Thyroid disease Mother    • Kidney disease Mother         STAGE 4   • Cancer Mother         SKIN    • Atrial fibrillation Mother    • Heart attack Father    • Heart attack Sister    • Thyroid disease Sister    • Cancer Sister         PITUITARY TUMOR    • Thyroid disease Sister    • Thyroid disease Sister    •  Thyroid disease Sister    • Diabetes Brother    • Hypertension Brother    • Hypertension Brother    • Hyperlipidemia Brother    • Breast cancer Neg Hx      Social History     Socioeconomic History   • Marital status:    Tobacco Use   • Smoking status: Never   • Smokeless tobacco: Never   Vaping Use   • Vaping status: Never Used   Substance and Sexual Activity   • Alcohol use: No   • Drug use: No   • Sexual activity: Yes     Partners: Male       Objective  Physical Exam  Constitutional:       Appearance: She is not ill-appearing or toxic-appearing.   Skin:     Comments: There is a 2-3 cm soft tissue neoplasm of the base of the right neck that is suspicious for a sebaceous cyst.  It is mobile with well-defined margins.   Neurological:      Mental Status: She is alert.   Psychiatric:         Behavior: Behavior is cooperative.       BMI is within normal parameters. No other follow-up for BMI required.      Assessment & Plan    1.  Soft tissue neoplasm: The patient has a soft tissue neoplasm of the right lateral neck near its base that has benign characteristics and is most suspicious for a sebaceous cyst.  It is large in size and is at risk of becoming infected.  This was discussed with her.  Based on the risk of infection, proceeding with an excision of the lesion is appropriate.  She will be scheduled for an excision of the soft tissue neoplasm.  The patient understands the indications, alternatives, risks, and benefits of the procedure and wishes to proceed.

## 2025-04-16 ENCOUNTER — PRE-ADMISSION TESTING (OUTPATIENT)
Dept: PREADMISSION TESTING | Facility: HOSPITAL | Age: 65
End: 2025-04-16
Payer: MEDICARE

## 2025-04-16 VITALS
SYSTOLIC BLOOD PRESSURE: 143 MMHG | WEIGHT: 134 LBS | TEMPERATURE: 97.6 F | OXYGEN SATURATION: 100 % | BODY MASS INDEX: 22.88 KG/M2 | DIASTOLIC BLOOD PRESSURE: 75 MMHG | HEART RATE: 98 BPM | RESPIRATION RATE: 18 BRPM | HEIGHT: 64 IN

## 2025-04-16 LAB
ANION GAP SERPL CALCULATED.3IONS-SCNC: 11.9 MMOL/L (ref 5–15)
BUN SERPL-MCNC: 6 MG/DL (ref 8–23)
BUN/CREAT SERPL: 8.6 (ref 7–25)
CALCIUM SPEC-SCNC: 9.4 MG/DL (ref 8.6–10.5)
CHLORIDE SERPL-SCNC: 100 MMOL/L (ref 98–107)
CO2 SERPL-SCNC: 24.1 MMOL/L (ref 22–29)
CREAT SERPL-MCNC: 0.7 MG/DL (ref 0.57–1)
DEPRECATED RDW RBC AUTO: 45.2 FL (ref 37–54)
EGFRCR SERPLBLD CKD-EPI 2021: 95.5 ML/MIN/1.73
ERYTHROCYTE [DISTWIDTH] IN BLOOD BY AUTOMATED COUNT: 14.9 % (ref 12.3–15.4)
GLUCOSE SERPL-MCNC: 141 MG/DL (ref 65–99)
HCT VFR BLD AUTO: 33.6 % (ref 34–46.6)
HGB BLD-MCNC: 10.7 G/DL (ref 12–15.9)
MCH RBC QN AUTO: 26.1 PG (ref 26.6–33)
MCHC RBC AUTO-ENTMCNC: 31.8 G/DL (ref 31.5–35.7)
MCV RBC AUTO: 82 FL (ref 79–97)
PLATELET # BLD AUTO: 464 10*3/MM3 (ref 140–450)
PMV BLD AUTO: 10.5 FL (ref 6–12)
POTASSIUM SERPL-SCNC: 4.2 MMOL/L (ref 3.5–5.2)
QT INTERVAL: 345 MS
QTC INTERVAL: 426 MS
RBC # BLD AUTO: 4.1 10*6/MM3 (ref 3.77–5.28)
SODIUM SERPL-SCNC: 136 MMOL/L (ref 136–145)
WBC NRBC COR # BLD AUTO: 10.39 10*3/MM3 (ref 3.4–10.8)

## 2025-04-16 PROCEDURE — 85027 COMPLETE CBC AUTOMATED: CPT

## 2025-04-16 PROCEDURE — 93005 ELECTROCARDIOGRAM TRACING: CPT

## 2025-04-16 PROCEDURE — 36415 COLL VENOUS BLD VENIPUNCTURE: CPT

## 2025-04-16 PROCEDURE — 80048 BASIC METABOLIC PNL TOTAL CA: CPT

## 2025-04-16 RX ORDER — DICLOFENAC SODIUM 75 MG/1
1 TABLET, DELAYED RELEASE ORAL EVERY 12 HOURS SCHEDULED
COMMUNITY
Start: 2025-04-09

## 2025-04-16 NOTE — DISCHARGE INSTRUCTIONS
Take the following medications the morning of surgery:      If you are on prescription narcotic pain medication to control your pain you may also take that medication the morning of surgery.      General Instructions:     Do not eat solid food after midnight the night before surgery.  Clear liquids day of surgery are allowed but must be stopped at least two hours before your hospital arrival time.       Allowed clear liquids      Water, sodas, and tea or coffee with no cream or milk added.       12 to 20 ounces of a clear liquid that contains carbohydrates is recommended.  If non-diabetic, have Gatorade or Powerade.  If diabetic, have G2 or Powerade Zero.     Do not have liquids red in color.  Do not consume chicken, beef, pork or vegetable broth or bouillon cubes of any variety as they are not considered clear liquids and are not allowed.      Infants may have breast milk up to four hours before surgery.  Infants drinking formula may drink formula up to six hours before surgery.   Patients who avoid smoking, chewing tobacco and alcohol for 4 weeks prior to surgery have a reduced risk of post-operative complications.  Quit smoking as many days before surgery as you can.  Do not smoke, use chewing tobacco or drink alcohol the day of surgery.   If applicable bring your C-PAP/ BI-PAP machine in with you to preop day of surgery.  Bring any papers given to you in the doctor’s office.  Wear clean comfortable clothes.  Do not wear contact lenses, false eyelashes or make-up.  Bring a case for your glasses.   Bring crutches or walker if applicable.  Remove all piercings.  Leave jewelry and any other valuables at home.  Hair extensions with metal clips must be removed prior to surgery.  The Pre-Admission Testing nurse will instruct you to bring medications if unable to obtain an accurate list in Pre-Admission Testing.    Day of surgery you will need to let the preoperative nurse know the last time you took each of your  medications.  To ensure a safe environment for patients and staff, we kindly ask that children under the age of 16 not accompany patients.  If you must bring a dependent child or dependent adult please ensure a responsible adult, other than yourself, is present to supervise them.      If you were given a blood bank ID arm band remember to bring it with you the day of surgery.    Preventing a Surgical Site Infection:  For 2 to 3 days before surgery, avoid shaving with a razor because the razor can irritate skin and make it easier to develop an infection.    Any areas of open skin can increase the risk of a post-operative wound infection by allowing bacteria to enter and travel throughout the body.  Notify your surgeon if you have any skin wounds / rashes even if it is not near the expected surgical site.  The area will need assessed to determine if surgery should be delayed until it is healed.  The night prior to surgery shower using a fresh bar of anti-bacterial soap (such as Dial) and clean washcloth.  Sleep in a clean bed with clean clothing.  Do not allow pets to sleep with you.  Shower on the morning of surgery using a fresh bar of anti-bacterial soap (such as Dial) and clean washcloth.  Dry with a clean towel and dress in clean clothing.  Ask your surgeon if you will be receiving antibiotics prior to surgery.  Make sure you, your family, and all healthcare providers clean their hands with soap and water or an alcohol based hand  before caring for you or your wound.    Day of surgery:  Your arrival time is approximately two hours before your scheduled surgery time.  Please note if you have an early arrival time the surgery doors do not open before 5:00 AM.  Upon arrival, a Pre-op nurse and Anesthesiologist will review your health history, obtain vital signs, and answer questions you may have.  The only belongings needed at this time will be a list of your home medications and if applicable your  C-PAP/BI-PAP machine.  A Pre-op nurse will start an IV and you may receive medication in preparation for surgery, including something to help you relax.     Please be aware that surgery does come with discomfort.  We want to make every effort to control your discomfort so please discuss any uncontrolled symptoms with your nurse.   Your doctor will most likely have prescribed pain medications.      If you are going home after surgery you will receive individualized written care instructions before being discharged.  A responsible adult must drive you to and from the hospital on the day of your surgery and ideally stay with you through the night.   .  Discharge prescriptions can be filled by the hospital pharmacy during regular pharmacy hours.  If you are having surgery late in the day/evening your prescription may be e-prescribed to your pharmacy.  Please verify your pharmacy hours or chose a 24 hour pharmacy to avoid not having access to your prescription because your pharmacy has closed for the day.    If you are staying overnight following surgery, you will be transported to your hospital room following the recovery period.  [facility] has all private rooms.    If you have any questions please call Pre-Admission Testing at [phone].  Deductibles and co-payments are collected on the day of service. Please be prepared to pay the required co-pay, deductible or deposit on the day of service as defined by your plan.    Call your surgeon immediately if you experience any of the following symptoms:  Sore Throat  Shortness of Breath or difficulty breathing  Cough  Chills  Body soreness or muscle pain  Headache  Fever  New loss of taste or smell  Do not arrive for your surgery ill.  Your procedure will need to be rescheduled to another time.  You will need to call your physician before the day of surgery to avoid any unnecessary exposure to hospital staff as well as other patients.  CHLORHEXIDINE CLOTH  INSTRUCTIONS  The morning of surgery follow these instructions using the Chlorhexidine cloths you've been given.  These steps reduce bacteria on the body.  Do not use the cloths near your eyes, ears mouth, genitalia or on open wounds.  Throw the cloths away after use but do not try to flush them down a toilet.      Open and remove one cloth at a time from the package.    Leave the cloth unfolded and begin the bathing.  Massage the skin with the cloths using gentle pressure to remove bacteria.  Do not scrub harshly.   Follow the steps below with one 2% CHG cloth per area (6 total cloths).  One cloth for neck, shoulders and chest.  One cloth for both arms, hands, fingers and underarms (do underarms last).  One cloth for the abdomen followed by groin.  One cloth for right leg and foot including between the toes.  One cloth for left leg and foot including between the toes.  The last cloth is to be used for the back of the neck, back and buttocks.    Allow the CHG to air dry 3 minutes on the skin which will give it time to work and decrease the chance of irritation.  The skin may feel sticky until it is dry.  Do not rinse with water or any other liquid or you will lose the beneficial effects of the CHG.  If mild skin irritation occurs, do rinse the skin to remove the CHG.  Report this to the nurse at time of admission.  Do not apply lotions, creams, ointments, deodorants or perfumes after using the clothes. Dress in clean clothes before coming to the hospital.

## 2025-04-16 NOTE — DISCHARGE INSTRUCTIONS
"Holding GLP-1 Receptor Agonists Prior to Anesthesia    In order for safe anesthesia, GLP-1 receptor agonist mediations need to be held before the procedure.     What are GLP-1 Receptor Agonists?  Glucagon-like peptide-1 (GLP-1) receptor agonists are approved by the Food and Drug Administration for treatment of type 2 diabetes mellitus and cardiovascular risk reduction. In addition, GLP-1 receptor agonists are also used for weight loss.     Which of my medications are GLP-1 Receptor Agonists?   Exanetide (Byetta®, Bydureon®)  Lixisenatide (Adlyxin®, Soliqua®)  Semaglutide (Wegovy®, Ozempic®, Rybelsus®)  Liraglutide (Saxenda®, Victoza®, Xutolphy®)  Dulaglutide (Trulicity®)   Tirzepatide (Mounjaro®, Zepbound®)    How can GLP-1 Receptor Agonists cause problems during surgery?  GLP-1 agonists can potentially cause adverse gastrointestinal effects, including the consequences of delayed gastric emptying. This can cause the stomach to be full even after refraining from eating and drinking before surgery and can cause regurgitation or \"throwing up\" of the stomach contents during anesthesia. If the contents enter the airway and the lungs, it could cause anesthesia complications and a severe pneumonia.     What should I do prior to my procedure?  According to the recommendations of the American Society of Anesthesiologists:    If you take GLP-1 agonists every day: Hold the medication on the day of the procedure/surgery.   If you take GLP-1 agonists once a week: Hold the medication a week prior to the procedure/surgery    What if I have questions?  If you have concerns or questions about holding your medications prior to your procedure, contact your doctors before stopping your medications.        HOLD IRBESARTAN 24 HRS PRIOR TO SURGERY          Take the following medications the morning of surgery:AMLODIPINE & CLARITIN      If you are on prescription narcotic pain medication to control your pain you may also take that " medication the morning of surgery.      General Instructions:     Do not eat solid food after midnight the night before surgery.  Clear liquids day of surgery are allowed but must be stopped at least two hours before your hospital arrival time.       Allowed clear liquids      Water, sodas, and tea or coffee with no cream or milk added.       12 to 20 ounces of a clear liquid that contains carbohydrates is recommended.  If non-diabetic, have Gatorade or Powerade.  If diabetic, have G2 or Powerade Zero.     Do not have liquids red in color.  Do not consume chicken, beef, pork or vegetable broth or bouillon cubes of any variety as they are not considered clear liquids and are not allowed.      Infants may have breast milk up to four hours before surgery.  Infants drinking formula may drink formula up to six hours before surgery.   Patients who avoid smoking, chewing tobacco and alcohol for 4 weeks prior to surgery have a reduced risk of post-operative complications.  Quit smoking as many days before surgery as you can.  Do not smoke, use chewing tobacco or drink alcohol the day of surgery.   If applicable bring your C-PAP/ BI-PAP machine in with you to preop day of surgery.  Bring any papers given to you in the doctor’s office.  Wear clean comfortable clothes.  Do not wear contact lenses, false eyelashes or make-up.  Bring a case for your glasses.   Bring crutches or walker if applicable.  Remove all piercings.  Leave jewelry and any other valuables at home.  Hair extensions with metal clips must be removed prior to surgery.  The Pre-Admission Testing nurse will instruct you to bring medications if unable to obtain an accurate list in Pre-Admission Testing.    Day of surgery you will need to let the preoperative nurse know the last time you took each of your medications.  To ensure a safe environment for patients and staff, we kindly ask that children under the age of 16 not accompany patients.  If you must bring a  dependent child or dependent adult please ensure a responsible adult, other than yourself, is present to supervise them.          Preventing a Surgical Site Infection:  For 2 to 3 days before surgery, avoid shaving with a razor because the razor can irritate skin and make it easier to develop an infection.    Any areas of open skin can increase the risk of a post-operative wound infection by allowing bacteria to enter and travel throughout the body.  Notify your surgeon if you have any skin wounds / rashes even if it is not near the expected surgical site.  The area will need assessed to determine if surgery should be delayed until it is healed.  The night prior to surgery shower using a fresh bar of anti-bacterial soap (such as Dial) and clean washcloth.  Sleep in a clean bed with clean clothing.  Do not allow pets to sleep with you.  Shower on the morning of surgery using a fresh bar of anti-bacterial soap (such as Dial) and clean washcloth.  Dry with a clean towel and dress in clean clothing.  Ask your surgeon if you will be receiving antibiotics prior to surgery.  Make sure you, your family, and all healthcare providers clean their hands with soap and water or an alcohol based hand  before caring for you or your wound.    Day of surgery:  Your arrival time is approximately two hours before your scheduled surgery time.  Please note if you have an early arrival time the surgery doors do not open before 5:00 AM.  Upon arrival, a Pre-op nurse and Anesthesiologist will review your health history, obtain vital signs, and answer questions you may have.  The only belongings needed at this time will be a list of your home medications and if applicable your C-PAP/BI-PAP machine.  A Pre-op nurse will start an IV and you may receive medication in preparation for surgery, including something to help you relax.     Please be aware that surgery does come with discomfort.  We want to make every effort to control your  discomfort so please discuss any uncontrolled symptoms with your nurse.   Your doctor will most likely have prescribed pain medications.      If you are going home after surgery you will receive individualized written care instructions before being discharged.  A responsible adult must drive you to and from the hospital on the day of your surgery and ideally stay with you through the night.   .  Discharge prescriptions can be filled by the hospital pharmacy during regular pharmacy hours.  If you are having surgery late in the day/evening your prescription may be e-prescribed to your pharmacy.  Please verify your pharmacy hours or chose a 24 hour pharmacy to avoid not having access to your prescription because your pharmacy has closed for the day.    If you are staying overnight following surgery, you will be transported to your hospital room following the recovery period.  [facility] has all private rooms.    If you have any questions please call Pre-Admission Testing at [phone].  Deductibles and co-payments are collected on the day of service. Please be prepared to pay the required co-pay, deductible or deposit on the day of service as defined by your plan.    Call your surgeon immediately if you experience any of the following symptoms:  Sore Throat  Shortness of Breath or difficulty breathing  Cough  Chills  Body soreness or muscle pain  Headache  Fever  New loss of taste or smell  Do not arrive for your surgery ill.  Your procedure will need to be rescheduled to another time.  You will need to call your physician before the day of surgery to avoid any unnecessary exposure to hospital staff as well as other patients.          CHLORHEXIDINE CLOTH INSTRUCTIONS  The morning of surgery follow these instructions using the Chlorhexidine cloths you've been given.  These steps reduce bacteria on the body.  Do not use the cloths near your eyes, ears mouth, genitalia or on open wounds.  Throw the  cloths away after use but do not try to flush them down a toilet.      Open and remove one cloth at a time from the package.    Leave the cloth unfolded and begin the bathing.  Massage the skin with the cloths using gentle pressure to remove bacteria.  Do not scrub harshly.   Follow the steps below with one 2% CHG cloth per area (6 total cloths).  One cloth for neck, shoulders and chest.  One cloth for both arms, hands, fingers and underarms (do underarms last).  One cloth for the abdomen followed by groin.  One cloth for right leg and foot including between the toes.  One cloth for left leg and foot including between the toes.  The last cloth is to be used for the back of the neck, back and buttocks.    Allow the CHG to air dry 3 minutes on the skin which will give it time to work and decrease the chance of irritation.  The skin may feel sticky until it is dry.  Do not rinse with water or any other liquid or you will lose the beneficial effects of the CHG.  If mild skin irritation occurs, do rinse the skin to remove the CHG.  Report this to the nurse at time of admission.  Do not apply lotions, creams, ointments, deodorants or perfumes after using the clothes. Dress in clean clothes before coming to the hospital.    Modified from the American Society of Anesthesiologists Consensus-Based Guidance on Preoperative Management of Patients (Adults and Children) on Glucagon-Like Peptide-1 (GLP-1) Receptor Agonists June 29,2023

## 2025-04-17 DIAGNOSIS — E78.5 HYPERLIPIDEMIA, UNSPECIFIED HYPERLIPIDEMIA TYPE: ICD-10-CM

## 2025-04-17 RX ORDER — ATORVASTATIN CALCIUM 80 MG/1
80 TABLET, FILM COATED ORAL DAILY
Qty: 90 TABLET | Refills: 2 | Status: SHIPPED | OUTPATIENT
Start: 2025-04-17

## 2025-04-17 NOTE — TELEPHONE ENCOUNTER
Rx Refill Note  Requested Prescriptions     Pending Prescriptions Disp Refills    atorvastatin (LIPITOR) 80 MG tablet [Pharmacy Med Name: ATORVASTATIN 80MG TABLETS] 90 tablet 2     Sig: TAKE 1 TABLET BY MOUTH DAILY.      Last office visit with prescribing clinician: 1/3/2025   Last telemedicine visit with prescribing clinician: Visit date not found   Next office visit with prescribing clinician: 5/2/2025                         Would you like a call back once the refill request has been completed: [] Yes [] No    If the office needs to give you a call back, can they leave a voicemail: [] Yes [] No    Cara Bills  04/17/25, 08:08 EDT

## 2025-04-18 DIAGNOSIS — R93.1 ELEVATED CORONARY ARTERY CALCIUM SCORE: Primary | ICD-10-CM

## 2025-04-23 ENCOUNTER — ANESTHESIA EVENT (OUTPATIENT)
Dept: PERIOP | Facility: HOSPITAL | Age: 65
End: 2025-04-23
Payer: MEDICARE

## 2025-04-23 ENCOUNTER — ANESTHESIA (OUTPATIENT)
Dept: PERIOP | Facility: HOSPITAL | Age: 65
End: 2025-04-23
Payer: MEDICARE

## 2025-04-23 ENCOUNTER — HOSPITAL ENCOUNTER (OUTPATIENT)
Facility: HOSPITAL | Age: 65
Setting detail: HOSPITAL OUTPATIENT SURGERY
Discharge: HOME | End: 2025-04-23
Attending: SURGERY | Admitting: SURGERY
Payer: MEDICARE

## 2025-04-23 VITALS
HEART RATE: 84 BPM | OXYGEN SATURATION: 100 % | SYSTOLIC BLOOD PRESSURE: 140 MMHG | DIASTOLIC BLOOD PRESSURE: 75 MMHG | BODY MASS INDEX: 22.88 KG/M2 | RESPIRATION RATE: 16 BRPM | TEMPERATURE: 97.9 F | WEIGHT: 134 LBS | HEIGHT: 64 IN

## 2025-04-23 DIAGNOSIS — L72.3 SEBACEOUS CYST: ICD-10-CM

## 2025-04-23 LAB
GLUCOSE BLDC GLUCOMTR-MCNC: 126 MG/DL (ref 70–130)
GLUCOSE BLDC GLUCOMTR-MCNC: 88 MG/DL (ref 70–130)

## 2025-04-23 PROCEDURE — 88304 TISSUE EXAM BY PATHOLOGIST: CPT | Performed by: SURGERY

## 2025-04-23 PROCEDURE — 25010000002 PROPOFOL 10 MG/ML EMULSION: Performed by: ANESTHESIOLOGY

## 2025-04-23 PROCEDURE — 25010000002 MIDAZOLAM PER 1 MG: Performed by: ANESTHESIOLOGY

## 2025-04-23 PROCEDURE — 25010000002 FENTANYL CITRATE (PF) 50 MCG/ML SOLUTION: Performed by: ANESTHESIOLOGY

## 2025-04-23 PROCEDURE — 82948 REAGENT STRIP/BLOOD GLUCOSE: CPT

## 2025-04-23 PROCEDURE — 25010000002 LIDOCAINE 1% - EPINEPHRINE 1:100000 1 %-1:100000 SOLUTION: Performed by: SURGERY

## 2025-04-23 PROCEDURE — 25010000002 CEFAZOLIN PER 500 MG: Performed by: SURGERY

## 2025-04-23 PROCEDURE — 25810000003 LACTATED RINGERS PER 1000 ML: Performed by: ANESTHESIOLOGY

## 2025-04-23 PROCEDURE — 25010000002 ONDANSETRON PER 1 MG: Performed by: ANESTHESIOLOGY

## 2025-04-23 PROCEDURE — 25010000002 FAMOTIDINE 10 MG/ML SOLUTION: Performed by: ANESTHESIOLOGY

## 2025-04-23 RX ORDER — SODIUM CHLORIDE 0.9 % (FLUSH) 0.9 %
3 SYRINGE (ML) INJECTION EVERY 12 HOURS SCHEDULED
Status: DISCONTINUED | OUTPATIENT
Start: 2025-04-23 | End: 2025-04-23 | Stop reason: HOSPADM

## 2025-04-23 RX ORDER — ACETAMINOPHEN 500 MG
1000 TABLET ORAL ONCE
Status: COMPLETED | OUTPATIENT
Start: 2025-04-23 | End: 2025-04-23

## 2025-04-23 RX ORDER — PHENYLEPHRINE HCL IN 0.9% NACL 1 MG/10 ML
SYRINGE (ML) INTRAVENOUS AS NEEDED
Status: DISCONTINUED | OUTPATIENT
Start: 2025-04-23 | End: 2025-04-23 | Stop reason: SURG

## 2025-04-23 RX ORDER — OXYCODONE AND ACETAMINOPHEN 5; 325 MG/1; MG/1
1 TABLET ORAL EVERY 6 HOURS PRN
Qty: 6 TABLET | Refills: 0 | Status: SHIPPED | OUTPATIENT
Start: 2025-04-23

## 2025-04-23 RX ORDER — SODIUM CHLORIDE 0.9 % (FLUSH) 0.9 %
3-10 SYRINGE (ML) INJECTION AS NEEDED
Status: DISCONTINUED | OUTPATIENT
Start: 2025-04-23 | End: 2025-04-23 | Stop reason: HOSPADM

## 2025-04-23 RX ORDER — HYDRALAZINE HYDROCHLORIDE 20 MG/ML
5 INJECTION INTRAMUSCULAR; INTRAVENOUS
Status: DISCONTINUED | OUTPATIENT
Start: 2025-04-23 | End: 2025-04-23 | Stop reason: HOSPADM

## 2025-04-23 RX ORDER — ONDANSETRON 2 MG/ML
4 INJECTION INTRAMUSCULAR; INTRAVENOUS ONCE AS NEEDED
Status: DISCONTINUED | OUTPATIENT
Start: 2025-04-23 | End: 2025-04-23 | Stop reason: HOSPADM

## 2025-04-23 RX ORDER — LIDOCAINE HYDROCHLORIDE AND EPINEPHRINE 10; 10 MG/ML; UG/ML
INJECTION, SOLUTION INFILTRATION; PERINEURAL AS NEEDED
Status: DISCONTINUED | OUTPATIENT
Start: 2025-04-23 | End: 2025-04-23 | Stop reason: HOSPADM

## 2025-04-23 RX ORDER — SODIUM CHLORIDE, SODIUM LACTATE, POTASSIUM CHLORIDE, CALCIUM CHLORIDE 600; 310; 30; 20 MG/100ML; MG/100ML; MG/100ML; MG/100ML
9 INJECTION, SOLUTION INTRAVENOUS CONTINUOUS
Status: DISCONTINUED | OUTPATIENT
Start: 2025-04-23 | End: 2025-04-23 | Stop reason: HOSPADM

## 2025-04-23 RX ORDER — PROMETHAZINE HYDROCHLORIDE 25 MG/1
25 TABLET ORAL ONCE AS NEEDED
Status: DISCONTINUED | OUTPATIENT
Start: 2025-04-23 | End: 2025-04-23 | Stop reason: HOSPADM

## 2025-04-23 RX ORDER — LABETALOL HYDROCHLORIDE 5 MG/ML
5 INJECTION, SOLUTION INTRAVENOUS
Status: DISCONTINUED | OUTPATIENT
Start: 2025-04-23 | End: 2025-04-23 | Stop reason: HOSPADM

## 2025-04-23 RX ORDER — NALOXONE HCL 0.4 MG/ML
0.2 VIAL (ML) INJECTION AS NEEDED
Status: DISCONTINUED | OUTPATIENT
Start: 2025-04-23 | End: 2025-04-23 | Stop reason: HOSPADM

## 2025-04-23 RX ORDER — HYDROCODONE BITARTRATE AND ACETAMINOPHEN 5; 325 MG/1; MG/1
1 TABLET ORAL ONCE AS NEEDED
Status: DISCONTINUED | OUTPATIENT
Start: 2025-04-23 | End: 2025-04-23 | Stop reason: HOSPADM

## 2025-04-23 RX ORDER — ONDANSETRON 2 MG/ML
INJECTION INTRAMUSCULAR; INTRAVENOUS AS NEEDED
Status: DISCONTINUED | OUTPATIENT
Start: 2025-04-23 | End: 2025-04-23 | Stop reason: SURG

## 2025-04-23 RX ORDER — IPRATROPIUM BROMIDE AND ALBUTEROL SULFATE 2.5; .5 MG/3ML; MG/3ML
3 SOLUTION RESPIRATORY (INHALATION) ONCE AS NEEDED
Status: DISCONTINUED | OUTPATIENT
Start: 2025-04-23 | End: 2025-04-23 | Stop reason: HOSPADM

## 2025-04-23 RX ORDER — HYDROCODONE BITARTRATE AND ACETAMINOPHEN 7.5; 325 MG/1; MG/1
1 TABLET ORAL EVERY 4 HOURS PRN
Status: DISCONTINUED | OUTPATIENT
Start: 2025-04-23 | End: 2025-04-23 | Stop reason: HOSPADM

## 2025-04-23 RX ORDER — MIDAZOLAM HYDROCHLORIDE 1 MG/ML
0.5 INJECTION, SOLUTION INTRAMUSCULAR; INTRAVENOUS
Status: DISCONTINUED | OUTPATIENT
Start: 2025-04-23 | End: 2025-04-23 | Stop reason: HOSPADM

## 2025-04-23 RX ORDER — DROPERIDOL 2.5 MG/ML
0.62 INJECTION, SOLUTION INTRAMUSCULAR; INTRAVENOUS
Status: DISCONTINUED | OUTPATIENT
Start: 2025-04-23 | End: 2025-04-23 | Stop reason: HOSPADM

## 2025-04-23 RX ORDER — PROMETHAZINE HYDROCHLORIDE 25 MG/1
25 SUPPOSITORY RECTAL ONCE AS NEEDED
Status: DISCONTINUED | OUTPATIENT
Start: 2025-04-23 | End: 2025-04-23 | Stop reason: HOSPADM

## 2025-04-23 RX ORDER — DIPHENHYDRAMINE HYDROCHLORIDE 50 MG/ML
12.5 INJECTION, SOLUTION INTRAMUSCULAR; INTRAVENOUS
Status: DISCONTINUED | OUTPATIENT
Start: 2025-04-23 | End: 2025-04-23 | Stop reason: HOSPADM

## 2025-04-23 RX ORDER — FAMOTIDINE 10 MG/ML
20 INJECTION, SOLUTION INTRAVENOUS ONCE
Status: COMPLETED | OUTPATIENT
Start: 2025-04-23 | End: 2025-04-23

## 2025-04-23 RX ORDER — PROPOFOL 10 MG/ML
VIAL (ML) INTRAVENOUS AS NEEDED
Status: DISCONTINUED | OUTPATIENT
Start: 2025-04-23 | End: 2025-04-23 | Stop reason: SURG

## 2025-04-23 RX ORDER — FLUMAZENIL 0.1 MG/ML
0.2 INJECTION INTRAVENOUS AS NEEDED
Status: DISCONTINUED | OUTPATIENT
Start: 2025-04-23 | End: 2025-04-23 | Stop reason: HOSPADM

## 2025-04-23 RX ORDER — FENTANYL CITRATE 50 UG/ML
50 INJECTION, SOLUTION INTRAMUSCULAR; INTRAVENOUS ONCE AS NEEDED
Status: COMPLETED | OUTPATIENT
Start: 2025-04-23 | End: 2025-04-23

## 2025-04-23 RX ORDER — LIDOCAINE HYDROCHLORIDE 10 MG/ML
0.5 INJECTION, SOLUTION INFILTRATION; PERINEURAL ONCE AS NEEDED
Status: DISCONTINUED | OUTPATIENT
Start: 2025-04-23 | End: 2025-04-23 | Stop reason: HOSPADM

## 2025-04-23 RX ORDER — EPHEDRINE SULFATE 50 MG/ML
5 INJECTION, SOLUTION INTRAVENOUS ONCE AS NEEDED
Status: DISCONTINUED | OUTPATIENT
Start: 2025-04-23 | End: 2025-04-23 | Stop reason: HOSPADM

## 2025-04-23 RX ORDER — ATROPINE SULFATE 0.4 MG/ML
0.4 INJECTION, SOLUTION INTRAMUSCULAR; INTRAVENOUS; SUBCUTANEOUS ONCE AS NEEDED
Status: DISCONTINUED | OUTPATIENT
Start: 2025-04-23 | End: 2025-04-23 | Stop reason: HOSPADM

## 2025-04-23 RX ORDER — FENTANYL CITRATE 50 UG/ML
25 INJECTION, SOLUTION INTRAMUSCULAR; INTRAVENOUS
Status: DISCONTINUED | OUTPATIENT
Start: 2025-04-23 | End: 2025-04-23 | Stop reason: HOSPADM

## 2025-04-23 RX ORDER — HYDROMORPHONE HYDROCHLORIDE 1 MG/ML
0.25 INJECTION, SOLUTION INTRAMUSCULAR; INTRAVENOUS; SUBCUTANEOUS
Status: DISCONTINUED | OUTPATIENT
Start: 2025-04-23 | End: 2025-04-23 | Stop reason: HOSPADM

## 2025-04-23 RX ADMIN — ACETAMINOPHEN 1000 MG: 500 TABLET, FILM COATED ORAL at 13:15

## 2025-04-23 RX ADMIN — FENTANYL CITRATE 25 MCG: 50 INJECTION, SOLUTION INTRAMUSCULAR; INTRAVENOUS at 17:20

## 2025-04-23 RX ADMIN — FENTANYL CITRATE 50 MCG: 50 INJECTION, SOLUTION INTRAMUSCULAR; INTRAVENOUS at 16:30

## 2025-04-23 RX ADMIN — Medication 100 MCG: at 16:42

## 2025-04-23 RX ADMIN — Medication 200 MCG: at 16:46

## 2025-04-23 RX ADMIN — SODIUM CHLORIDE, POTASSIUM CHLORIDE, SODIUM LACTATE AND CALCIUM CHLORIDE 9 ML/HR: 600; 310; 30; 20 INJECTION, SOLUTION INTRAVENOUS at 13:41

## 2025-04-23 RX ADMIN — ONDANSETRON 4 MG: 2 INJECTION, SOLUTION INTRAMUSCULAR; INTRAVENOUS at 16:39

## 2025-04-23 RX ADMIN — CEFAZOLIN 2000 MG: 2 INJECTION, POWDER, FOR SOLUTION INTRAMUSCULAR; INTRAVENOUS at 16:06

## 2025-04-23 RX ADMIN — FENTANYL CITRATE 25 MCG: 50 INJECTION, SOLUTION INTRAMUSCULAR; INTRAVENOUS at 17:15

## 2025-04-23 RX ADMIN — MIDAZOLAM 0.5 MG: 1 INJECTION INTRAMUSCULAR; INTRAVENOUS at 13:42

## 2025-04-23 RX ADMIN — FAMOTIDINE 20 MG: 10 INJECTION, SOLUTION INTRAVENOUS at 13:41

## 2025-04-23 RX ADMIN — PROPOFOL 100 MCG/KG/MIN: 10 INJECTION, EMULSION INTRAVENOUS at 16:39

## 2025-04-23 RX ADMIN — PROPOFOL 70 MG: 10 INJECTION, EMULSION INTRAVENOUS at 16:30

## 2025-04-23 NOTE — ANESTHESIA POSTPROCEDURE EVALUATION
"Patient: Rafiq Yost    Procedure Summary       Date: 04/23/25 Room / Location: [facility] OR  / [facility] MAIN OR    Anesthesia Start: 1609 Anesthesia Stop: 1704    Procedure: Excision of soft tissue neoplasm of right neck Diagnosis:       Sebaceous cyst      (Sebaceous cyst [L72.3])    Surgeons: Joseph Montaño Jr., MD Provider: Ayde Snyder MD    Anesthesia Type: general ASA Status: 2            Anesthesia Type: general    Vitals  Vitals Value Taken Time   /75 04/23/25 17:40   Temp 36.6 °C (97.9 °F) 04/23/25 17:03   Pulse 89 04/23/25 17:42   Resp 16 04/23/25 17:40   SpO2 99 % 04/23/25 17:42   Vitals shown include unfiled device data.        Post Anesthesia Care and Evaluation    Patient location during evaluation: bedside  Patient participation: complete - patient participated  Level of consciousness: awake and alert  Pain management: adequate    Airway patency: patent  Anesthetic complications: No anesthetic complications    Cardiovascular status: acceptable  Respiratory status: acceptable  Hydration status: acceptable    Comments: /75   Pulse 84   Temp 36.6 °C (97.9 °F) (Oral)   Resp 16   Ht 161.3 cm (63.5\")   Wt 60.8 kg (134 lb)   SpO2 100%   BMI 23.36 kg/m²     "

## 2025-04-23 NOTE — OP NOTE
Surgeon: Joseph Montaño Jr., M.D.    Pre-Operative Diagnosis:     Sebaceous cyst [L72.3]    Post-Operative Diagnosis:    Sebaceous cyst    Procedure Performed:     Excision of right lower neck sebaceous cyst    Indications:     The patient is a pleasant 61-year-old female with a soft tissue neoplasm of the right lower neck that is suspicious for a sebaceous cyst.  It has gotten larger and is at high risk for becoming infected.  She presents for excision of the soft tissue neoplasm.  The patient understands the indications, alternatives, risks, and benefits of the procedure and wishes to proceed.     Procedure:     The patient was identified and taken to the operating room where she was placed in the left side down lateral decubitus position.  She underwent a MAC anesthesia and was appropriate monitored throughout the case by the anesthesia personnel.  Her right lateral neck was prepped and draped in the standard surgical fashion.  The area was infiltrated with 1% lidocaine with epinephrine.  An elliptical incision was made with a scalpel carried through the skin into the subcutaneous tissue to include the punctum of the cyst.  The cyst was sharply excised from the subcutaneous tissue and passed off for pathology.  It was 2.2 cm in size.  The area was infiltrated with 1% lidocaine with epinephrine.  Hemostasis was noted be adequate.  The skin was closed with a 4-0 Monocryl in a subcuticular fashion followed by Mastisol and Steri-Strips.  A small gauze sponge was placed and a Tegaderm.  The sponge, needle, and instrument counts were correct at the end of the case.  The patient tolerated the procedure well and was transferred to the recovery room in stable condition.    Estimated Blood Loss:      minimal    Specimens:     Order Name Source Comment Collection Info Order Time   TISSUE PATHOLOGY EXAM Neck  Collected By: Joseph Montaño Jr., MD 4/23/2025  4:55 PM     Release to patient   Routine Release            Joseph  Jr. Montaño M.D.

## 2025-04-23 NOTE — ANESTHESIA PREPROCEDURE EVALUATION
Anesthesia Evaluation     history of anesthetic complications:  PONV               Airway   TM distance: >3 FB  Neck ROM: full  Small opening  Dental      Comment: Lower plate    Pulmonary    (-) shortness of breath, recent URI  Cardiovascular     (+) hypertension, hyperlipidemia  (-) dysrhythmias, angina    ROS comment: BP Rarm 168/73 on L side 148/73 patient to see Dr.Kemp santana    Neuro/Psych  (+) numbness ([place])  (-) dizziness/light headedness, syncope  GI/Hepatic/Renal/Endo    (+) diabetes mellitus    Musculoskeletal     Abdominal    Substance History      OB/GYN          Other                    Anesthesia Plan    ASA 2     general     intravenous induction     Anesthetic plan, risks, benefits, and alternatives have been provided, discussed and informed consent has been obtained with: patient.    CODE STATUS:

## 2025-04-25 LAB
CYTO UR: NORMAL
LAB AP CASE REPORT: NORMAL
PATH REPORT.FINAL DX SPEC: NORMAL
PATH REPORT.GROSS SPEC: NORMAL

## 2025-05-02 ENCOUNTER — OFFICE VISIT (OUTPATIENT)
Dept: ENDOCRINOLOGY | Age: 65
End: 2025-05-02
Payer: MEDICARE

## 2025-05-02 VITALS
WEIGHT: 134 LBS | HEART RATE: 84 BPM | HEIGHT: 64 IN | DIASTOLIC BLOOD PRESSURE: 80 MMHG | BODY MASS INDEX: 22.88 KG/M2 | SYSTOLIC BLOOD PRESSURE: 152 MMHG | OXYGEN SATURATION: 97 % | TEMPERATURE: 97.7 F

## 2025-05-02 DIAGNOSIS — M85.89 OSTEOPENIA OF MULTIPLE SITES: ICD-10-CM

## 2025-05-02 DIAGNOSIS — Z79.4 TYPE 2 DIABETES MELLITUS WITHOUT COMPLICATION, WITH LONG-TERM CURRENT USE OF INSULIN: Primary | ICD-10-CM

## 2025-05-02 DIAGNOSIS — E78.5 HYPERLIPIDEMIA, UNSPECIFIED HYPERLIPIDEMIA TYPE: ICD-10-CM

## 2025-05-02 DIAGNOSIS — I10 ESSENTIAL HYPERTENSION: ICD-10-CM

## 2025-05-02 DIAGNOSIS — E11.9 TYPE 2 DIABETES MELLITUS WITHOUT COMPLICATION, WITH LONG-TERM CURRENT USE OF INSULIN: Primary | ICD-10-CM

## 2025-05-02 DIAGNOSIS — E55.9 VITAMIN D DEFICIENCY: ICD-10-CM

## 2025-05-02 RX ORDER — CYST/ALA/Q10/PHOS.SER/DHA/BROC 100-20-50
POWDER (GRAM) ORAL
COMMUNITY

## 2025-05-02 RX ORDER — GLIMEPIRIDE 4 MG/1
TABLET ORAL
Qty: 180 TABLET | Refills: 2 | Status: SHIPPED | OUTPATIENT
Start: 2025-05-02

## 2025-05-02 NOTE — PROGRESS NOTES
Subjective   Rafiq Yost is a 65 y.o. female.     History of Present Illness     She has type 2 diabetes mellitus and is on glimepiride 4 mg 1 tablet every morning and 1/2 tablet every evening, Trulicity 4.5 mg weekly, and metformin  mg 2 tabs BID.  Her co-pay for Trulicity went up.  She has been off Farxiga 10 mg for 3 months because of high co-pay.  She has no significant weight change since January 2025.  Fasting glucose .  Evening glucose .  She has hypoglycemia in the early morning once a week.  Her last meal was last night.     Her last eye examination was in Aug 2024.  She has no retinopathy.  She has early cataracts.  Urine microalbumin is normal in 2/24.  She denies numbness, tingling or burning in her hands or feet.     She has hyperlipidemia and is on Lipitor 80 mg/day.  She denies myalgia.     She has hypertension and is on irbesartan 150 mg once a day and amlodipine 2.5 mg/day.  She denies shortness of breath.      She has intermittent left upper arm pain and chest pain.  She had a coronary artery calcium scoring CT done in April 2025 which showed extensive identifiable coronary atherosclerotic burden.  She is scheduled to see Dr. Momin.     She has osteopenia on bone density done in August 2022 with a slight decrease compared to 2019.  Bone density done in September 2024 showed osteopenia.  When compared to prior examination of August 2022, there has been significant decrease in the left hip by 3.6% in the lumbar spine by 2.6%.  10-year risk for major osteoporotic fracture is 17% and of hip fracture is 1.0%.     She has vitamin D deficiency and is on vitamin D3 2000 units/day.  She exercises regularly.       The following portions of the patient's history were reviewed and updated as appropriate: allergies, current medications, past family history, past medical history, past social history, past surgical history, and problem list.    Review of Systems   Eyes:  Negative for visual  "disturbance.   Respiratory:  Negative for shortness of breath and wheezing.    Cardiovascular:  Positive for chest pain. Negative for palpitations.   Gastrointestinal: Negative.    Genitourinary: Negative.    Musculoskeletal:  Negative for myalgias.   Neurological:  Negative for numbness.     Vitals:    05/02/25 0901   BP: 152/80   Pulse: 84   Temp: 97.7 °F (36.5 °C)   TempSrc: Oral   SpO2: 97%   Weight: 60.8 kg (134 lb)   Height: 161.3 cm (63.5\")      Objective   Physical Exam  Constitutional:       General: She is not in acute distress.     Appearance: Normal appearance. She is not ill-appearing, toxic-appearing or diaphoretic.   Eyes:      General: No scleral icterus.        Right eye: No discharge.         Left eye: No discharge.   Neck:      Vascular: No carotid bruit.   Cardiovascular:      Rate and Rhythm: Normal rate and regular rhythm.      Heart sounds: Normal heart sounds. No murmur heard.     No friction rub. No gallop.   Pulmonary:      Breath sounds: Normal breath sounds. No rales.   Chest:      Chest wall: No tenderness.   Abdominal:      Palpations: Abdomen is soft.      Tenderness: There is no right CVA tenderness or left CVA tenderness.   Musculoskeletal:      Right lower leg: No edema.      Left lower leg: No edema.   Lymphadenopathy:      Cervical: No cervical adenopathy.   Neurological:      Mental Status: She is alert and oriented to person, place, and time.       Admission on 04/23/2025, Discharged on 04/23/2025   Component Date Value Ref Range Status    Glucose 04/23/2025 126  70 - 130 mg/dL Final    Case Report 04/23/2025    Final                    Value:Surgical Pathology Report                         Case: [id number]                                  Authorizing Provider:  Joseph Montaño Jr., MD    Collected:           04/23/2025 04:48 PM          Ordering Location:     [facility]  Received:            04/23/2025 05:37 PM                                 MAIN OR             " "                                                         Pathologist:           Mari Arredondo MD                                                          Specimen:    Neck, excison of sebaceous cyst                                                            Final Diagnosis 04/23/2025    Final                    Value:1.  Soft tissue, sebaceous cyst, excision:   A.  Epidermal inclusion cyst.      Gross Description 04/23/2025    Final                    Value:1. Neck.  Received in formalin labeled \"excision of sebaceous cyst\" is a focally disrupted 2.3 x 2.0 x 2.0 cm cyst containing a white pasty substance.  The cyst is partially surfaced by 1.8 x 0.7 cm tan-white skin ellipse.  A representative section is submitted as 1A.    jap/uso/jajackie        Microscopic Description 04/23/2025    Final                    Value:Unless \"gross only\" is specified, the final diagnosis for each specimen is based on microscopic examination of tissue.      Glucose 04/23/2025 88  70 - 130 mg/dL Final     Assessment & Plan   Diagnoses and all orders for this visit:    1. Hyperlipidemia, unspecified hyperlipidemia type (Primary)  -     Lipid Panel  -     TSH Rfx On Abnormal To Free T4    2. Type 2 diabetes mellitus without complication, with long-term current use of insulin  -     Microalbumin / Creatinine Urine Ratio - Urine, Clean Catch  -     Comprehensive Metabolic Panel  -     Hemoglobin A1c  -     Vitamin B12  -     TSH Rfx On Abnormal To Free T4    3. Essential hypertension  -     Comprehensive Metabolic Panel    4. Osteopenia of multiple sites  -     TSH Rfx On Abnormal To Free T4    5. Vitamin D deficiency  -     Vitamin D,25-Hydroxy      Discontinue Trulicity 4.5 mg weekly due to cost.  Start Ozempic 0.25 mg weekly for 4 weeks then 0.5 mg weekly thereafter.  Increase glimepiride to 4 mg 1 tablet twice a day.  Continue metformin  mg 2 tablets twice a day.    Continue Lipitor 80 mg/day.    Continue irbesartan and " amlodipine.  Keep appointment with Dr. Momin.    Continue vitamin D3 2000 units/day.  Continue regular exercise.  Repeat bone density in September 2026.    Copy of my note sent to Dr. KATHRINE Amaya.    RTC 4 mos.

## 2025-05-03 LAB
25(OH)D3+25(OH)D2 SERPL-MCNC: 59.8 NG/ML (ref 30–100)
ALBUMIN SERPL-MCNC: 4.4 G/DL (ref 3.5–5.2)
ALBUMIN/CREAT UR: <10 MG/G CREAT (ref 0–29)
ALBUMIN/GLOB SERPL: 1.9 G/DL
ALP SERPL-CCNC: 112 U/L (ref 39–117)
ALT SERPL-CCNC: 15 U/L (ref 1–33)
AST SERPL-CCNC: 18 U/L (ref 1–32)
BILIRUB SERPL-MCNC: 0.5 MG/DL (ref 0–1.2)
BUN SERPL-MCNC: 6 MG/DL (ref 8–23)
BUN/CREAT SERPL: 9.7 (ref 7–25)
CALCIUM SERPL-MCNC: 9.1 MG/DL (ref 8.6–10.5)
CHLORIDE SERPL-SCNC: 102 MMOL/L (ref 98–107)
CHOLEST SERPL-MCNC: 122 MG/DL (ref 0–200)
CO2 SERPL-SCNC: 24.4 MMOL/L (ref 22–29)
CREAT SERPL-MCNC: 0.62 MG/DL (ref 0.57–1)
CREAT UR-MCNC: 28.6 MG/DL
EGFRCR SERPLBLD CKD-EPI 2021: 98.4 ML/MIN/1.73
GLOBULIN SER CALC-MCNC: 2.3 GM/DL
GLUCOSE SERPL-MCNC: 111 MG/DL (ref 65–99)
HBA1C MFR BLD: 6.8 % (ref 4.8–5.6)
HDLC SERPL-MCNC: 37 MG/DL (ref 40–60)
IMP & REVIEW OF LAB RESULTS: NORMAL
LDLC SERPL CALC-MCNC: 67 MG/DL (ref 0–100)
MICROALBUMIN UR-MCNC: <3 UG/ML
POTASSIUM SERPL-SCNC: 4.4 MMOL/L (ref 3.5–5.2)
PROT SERPL-MCNC: 6.7 G/DL (ref 6–8.5)
SODIUM SERPL-SCNC: 139 MMOL/L (ref 136–145)
TRIGL SERPL-MCNC: 94 MG/DL (ref 0–150)
TSH SERPL DL<=0.005 MIU/L-ACNC: 1.09 UIU/ML (ref 0.27–4.2)
VIT B12 SERPL-MCNC: 936 PG/ML (ref 211–946)
VLDLC SERPL CALC-MCNC: 18 MG/DL (ref 5–40)

## 2025-05-06 ENCOUNTER — OFFICE VISIT (OUTPATIENT)
Dept: SURGERY | Facility: CLINIC | Age: 65
End: 2025-05-06
Payer: MEDICARE

## 2025-05-06 VITALS
HEIGHT: 64 IN | OXYGEN SATURATION: 98 % | DIASTOLIC BLOOD PRESSURE: 84 MMHG | WEIGHT: 133.6 LBS | BODY MASS INDEX: 22.81 KG/M2 | SYSTOLIC BLOOD PRESSURE: 138 MMHG | HEART RATE: 104 BPM

## 2025-05-06 DIAGNOSIS — Z48.89 POSTOPERATIVE VISIT: Primary | ICD-10-CM

## 2025-05-06 NOTE — LETTER
May 6, 2025     Jeremy Yanes MD     Patient: Rafiq Yost   YOB: 1960   Date of Visit: 5/6/2025     Dear Jeremy Yanes MD:       Thank you for referring Rafiq Yost to me for evaluation. Below are the relevant portions of my assessment and plan of care.    If you have questions, please do not hesitate to call me. I look forward to following Rafiq along with you.         Sincerely,        Joseph Montaño Jr., MD        CC: No Recipients    Joseph Montaño Jr., MD  05/06/25 1039  Sign when Signing Visit  Subjective  Rafiq Yost is a 65 y.o. female who returns to the office after undergoing an excision of a right neck sebaceous cyst on 4/23/2025.     History of present illness  The patient is recovering well with no significant postop symptoms.  She is having no abdominal pain.  She has a good appetite and normal bowel function.  Her energy level is good.      The pathology showed an epidermal inclusion cyst.    Review of Systems   Constitutional:  Negative for chills and fever.   Gastrointestinal:  Negative for abdominal pain and nausea.       Objective  Physical Exam  Constitutional:       Appearance: She is not ill-appearing or toxic-appearing.   Skin:     Comments: Incision: Intact with no evidence of infection.   Neurological:      Mental Status: She is alert.   Psychiatric:         Behavior: Behavior is cooperative.       BMI is within normal parameters. No other follow-up for BMI required.        Assessment & Plan    1.  Postoperative visit: The patient is recovering well from the excision of a sebaceous cyst of the right neck.  At this point she has no limitations.  She will follow-up on an as-needed basis.

## 2025-05-06 NOTE — PROGRESS NOTES
Subjective   Rafiq Yost is a 65 y.o. female who returns to the office after undergoing an excision of a right neck sebaceous cyst on 4/23/2025.     History of present illness  The patient is recovering well with no significant postop symptoms.  She is having no abdominal pain.  She has a good appetite and normal bowel function.  Her energy level is good.      The pathology showed an epidermal inclusion cyst.    Review of Systems   Constitutional:  Negative for chills and fever.   Gastrointestinal:  Negative for abdominal pain and nausea.       Objective   Physical Exam  Constitutional:       Appearance: She is not ill-appearing or toxic-appearing.   Skin:     Comments: Incision: Intact with no evidence of infection.   Neurological:      Mental Status: She is alert.   Psychiatric:         Behavior: Behavior is cooperative.       BMI is within normal parameters. No other follow-up for BMI required.        Assessment & Plan     1.  Postoperative visit: The patient is recovering well from the excision of a sebaceous cyst of the right neck.  At this point she has no limitations.  She will follow-up on an as-needed basis.

## 2025-05-12 NOTE — PROGRESS NOTES
RM:________    Referral Provider: Nicki Rogers* Jeremy Yanes MD    NEW PATIENT/ CONSULT  PREVIOUS CARDIOLOGIST: ______________________________    CARDIAC TESTING: __________________________________________________    : 1960   AGE: 65 y.o.    2025  REASON FOR VISIT/  CC:      WT: ____________ BP: __________L __________R/ HR_______________    ALLERGIES:  Codeine and Cycloset [bromocriptine]  SMOKING HISTORY  Social History     Tobacco Use    Smoking status: Never    Smokeless tobacco: Never   Vaping Use    Vaping status: Never Used   Substance Use Topics    Alcohol use: No    Drug use: No          H/O: MI_____   STROKE________   GOUT_____   ANEMIA______     CAROTID________ HIV____ CAD_______ HYPERCHOL _____    H/O: CHF _____   RF____ DM___ HTN_______PVD____THYROID DISEASE_______    PMH: GI ____   HEPATITIS ___ KIDNEY DISEASE ___ LUNG DISEASE _______     SLEEP APNEA ____ BLOOD CLOTS ____ DVT ____ VEIN STRIPPING ___________      STOP BANG _________ (CARDIO ONCOLOGY ONLY)    CANCER _________________________________ CHEMO/ RADIATION__________

## 2025-05-14 ENCOUNTER — OFFICE VISIT (OUTPATIENT)
Dept: CARDIOLOGY | Age: 65
End: 2025-05-14
Payer: MEDICARE

## 2025-05-14 ENCOUNTER — TELEPHONE (OUTPATIENT)
Dept: CARDIOLOGY | Age: 65
End: 2025-05-14
Payer: COMMERCIAL

## 2025-05-14 VITALS
WEIGHT: 134.6 LBS | DIASTOLIC BLOOD PRESSURE: 70 MMHG | HEIGHT: 64 IN | SYSTOLIC BLOOD PRESSURE: 164 MMHG | HEART RATE: 107 BPM | OXYGEN SATURATION: 98 % | BODY MASS INDEX: 22.98 KG/M2

## 2025-05-14 DIAGNOSIS — I20.89 CHRONIC STABLE ANGINA: Primary | ICD-10-CM

## 2025-05-14 DIAGNOSIS — R93.1 AGATSTON CAC SCORE, >400: ICD-10-CM

## 2025-05-14 DIAGNOSIS — Z82.49 FAMILY HISTORY OF PREMATURE CORONARY ARTERY DISEASE: ICD-10-CM

## 2025-05-14 DIAGNOSIS — R09.89 UNEQUAL BLOOD PRESSURE IN UPPER EXTREMITIES: ICD-10-CM

## 2025-05-14 DIAGNOSIS — E78.2 MIXED HYPERLIPIDEMIA: ICD-10-CM

## 2025-05-14 PROCEDURE — 3077F SYST BP >= 140 MM HG: CPT | Performed by: INTERNAL MEDICINE

## 2025-05-14 PROCEDURE — 1160F RVW MEDS BY RX/DR IN RCRD: CPT | Performed by: INTERNAL MEDICINE

## 2025-05-14 PROCEDURE — 3078F DIAST BP <80 MM HG: CPT | Performed by: INTERNAL MEDICINE

## 2025-05-14 PROCEDURE — 1159F MED LIST DOCD IN RCRD: CPT | Performed by: INTERNAL MEDICINE

## 2025-05-14 PROCEDURE — 99204 OFFICE O/P NEW MOD 45 MIN: CPT | Performed by: INTERNAL MEDICINE

## 2025-05-14 NOTE — PROGRESS NOTES
Date of Office Visit: 25  Encounter Provider: Hermelindo Momin MD  Place of Service: [facility] CARDIOLOGY  Patient Name: Rafiq Yost  :1960    Chief Complaint   Patient presents with    Abnormal Imaging     CT CALCIUM SCORE     :     HPI:     Ms. Yost is 65 y.o. and presents today due to abnormal imaging and chest pain.    She has had T2DM for 30 years. She has never smoked. She has a history of premature CAD in her father and sister. She has hyperlipidemia and hypertension.     She had a calcium score performed in 2025; her score was quite elevated at 1877 (, , , , PDA 18, other 99).     She had surgery to remove a cyst from her neck in April. Anesthesia noted that her SBP differed by ~ 20 points from her left to her right arm, with the left being lower.     For months, she has had predictable exertional heavy discomfort that starts in her left arm and moves into her left chest. It's a squeezing pain and she feels a bit nauseated. She's dyspneic but not diaphoretic. It resolves with rest. It does not occur at rest.     Past Medical History:   Diagnosis Date    Hyperlipidemia     Hypertension     Melanoma     LEFT ARM    Neoplasm of neck     RIGHT    PONV (postoperative nausea and vomiting)     Type 2 diabetes mellitus        Past Surgical History:   Procedure Laterality Date    DILATATION AND CURETTAGE      ECTOPIC PREGNANCY      EXCISION MASS HEAD/NECK N/A 2025    Procedure: Excision of soft tissue neoplasm of right neck;  Surgeon: Joseph Montaño Jr., MD;  Location: [facility];  Service: General;  Laterality: N/A;    HYSTERECTOMY      TEETH EXTRACTION Bilateral 2022       Social History     Socioeconomic History    Marital status:     Number of children: 1   Tobacco Use    Smoking status: Never     Passive exposure: Never    Smokeless tobacco: Never   Vaping Use    Vaping status: Never Used   Substance and Sexual Activity     Alcohol use: No    Drug use: No    Sexual activity: Yes     Partners: Male       Family History   Problem Relation Age of Onset    Diabetes Mother     Hypertension Mother     Thyroid disease Mother     Kidney disease Mother         STAGE 4    Cancer Mother         SKIN     Atrial fibrillation Mother     Heart attack Father     Heart attack Sister     Thyroid disease Sister     Cancer Sister         PITUITARY TUMOR     Thyroid disease Sister     Thyroid disease Sister     Thyroid disease Sister     Diabetes Brother     Hypertension Brother     Hypertension Brother     Hyperlipidemia Brother     Breast cancer Neg Hx     Malig Hyperthermia Neg Hx        Review of Systems   Constitutional: Positive for malaise/fatigue.   Cardiovascular:  Positive for chest pain and dyspnea on exertion.       Allergies   Allergen Reactions    Codeine Mental Status Change     GOOFY ACTING    Cycloset [Bromocriptine] Nausea And Vomiting         Current Outpatient Medications:     amLODIPine (NORVASC) 2.5 MG tablet, Take 1 tablet by mouth Daily. Indications: High Blood Pressure, Disp: 90 tablet, Rfl: 2    atorvastatin (LIPITOR) 80 MG tablet, TAKE 1 TABLET BY MOUTH DAILY., Disp: 90 tablet, Rfl: 2    Benfotiamine 150 MG capsule, Take  by mouth 2 (Two) Times a Day., Disp: , Rfl:     Blood Glucose Monitoring Suppl (FREESTYLE FREEDOM LITE) w/Device kit, U TO TEST BLOOD SUGAR D, Disp: , Rfl:     Cholecalciferol 50 MCG (2000 UT) capsule, Take 1 capsule by mouth Daily., Disp: , Rfl:     Cinnamon 500 MG capsule, Take 2 capsules by mouth Daily., Disp: , Rfl:     cyclobenzaprine (FLEXERIL) 5 MG tablet, TAKE 1 TABLET BY MOUTH THREE TIMES DAILY AS NEEDED FOR MUSCLE SPASMS, Disp: 90 tablet, Rfl: 0    esomeprazole (nexIUM) 20 MG capsule, Take 1 capsule by mouth As Needed., Disp: , Rfl:     GARLIC PO, Take  by mouth Daily., Disp: , Rfl:     glimepiride (AMARYL) 4 MG tablet, 1 tablet twice a day with meals  Indications: Type 2 Diabetes, Disp: 180 tablet,  "Rfl: 2    glucose blood (FreeStyle InsuLinx Test) test strip, 1 each by Other route 2 (Two) Times a Day. Indications: Type 2 Diabetes, Disp: 200 each, Rfl: 3    irbesartan (AVAPRO) 150 MG tablet, TAKE 1 TABLET BY MOUTH DAILY., Disp: 90 tablet, Rfl: 2    Lancets (freestyle) lancets, Dx code E11.9 USE TO TEST BLOOD SUGAR THREE TIMES DAILY, Disp: 300 each, Rfl: 3    Loratadine 10 MG capsule, Take  by mouth As Needed., Disp: , Rfl:     metFORMIN ER (GLUCOPHAGE-XR) 500 MG 24 hr tablet, TAKE 2 TABLETS BY MOUTH TWICE DAILY WITH MEALS, Disp: 360 tablet, Rfl: 2    vitamin B-12 (CYANOCOBALAMIN) 1000 MCG tablet, Take 0.5 tablets by mouth Daily., Disp: , Rfl:     diclofenac (VOLTAREN) 75 MG EC tablet, Take 1 tablet by mouth Every 12 (Twelve) Hours. (Patient not taking: Reported on 5/14/2025), Disp: , Rfl:     Semaglutide,0.25 or 0.5MG/DOS, (OZEMPIC) 2 MG/3ML solution pen-injector, 0.25 mg weekly for 4 weeks then 0.5 mg weekly thereafter. (Patient not taking: Reported on 5/14/2025), Disp: 3 mL, Rfl: 3      Objective:     Vitals:    05/14/25 1401 05/14/25 1409   BP: 142/68 164/70   BP Location: Left arm Right arm   Pulse:  107   SpO2:  98%   Weight: 61.1 kg (134 lb 9.6 oz)    Height: 161.3 cm (63.5\")      Body mass index is 23.47 kg/m².    Vitals reviewed.   Constitutional:       Appearance: Well-developed and not in distress.   Eyes:      Conjunctiva/sclera: Conjunctivae normal.   HENT:      Head: Normocephalic.      Nose: Nose normal.   Neck:      Thyroid: Thyroid normal.      Vascular: No JVD. JVD normal.      Lymphadenopathy: No cervical adenopathy.   Pulmonary:      Effort: Pulmonary effort is normal.      Breath sounds: Normal breath sounds.   Cardiovascular:      Normal rate. Regular rhythm.      Murmurs: There is no murmur.   Pulses:     Intact distal pulses.   Edema:     Peripheral edema absent.   Abdominal:      Palpations: Abdomen is soft.      Tenderness: There is no abdominal tenderness.   Musculoskeletal: Normal " range of motion.      Cervical back: Normal range of motion. Skin:     General: Skin is warm and dry.   Neurological:      General: No focal deficit present.      Mental Status: Alert and oriented to person, place, and time.      Cranial Nerves: No cranial nerve deficit.   Psychiatric:         Behavior: Behavior normal.         Thought Content: Thought content normal.         Judgment: Judgment normal.         Procedures 4/16/2025 NSR, normal EKG        Assessment:       Diagnosis Plan   1. Chronic stable angina  Case Request Cath Lab: Coronary angiography, Left heart cath, Left ventriculography    Basic Metabolic Panel    CBC (No Diff)    Lipoprotein A (LPA)    Apolipoprotein B      2. Unequal blood pressure in upper extremities  Case Request Cath Lab: Coronary angiography, Left heart cath, Left ventriculography    Basic Metabolic Panel    CBC (No Diff)    Lipoprotein A (LPA)    Apolipoprotein B      3. Agatston CAC score, >400  Case Request Cath Lab: Coronary angiography, Left heart cath, Left ventriculography    Basic Metabolic Panel    CBC (No Diff)    Lipoprotein A (LPA)    Apolipoprotein B      4. Family history of premature coronary artery disease  Case Request Cath Lab: Coronary angiography, Left heart cath, Left ventriculography    Basic Metabolic Panel    CBC (No Diff)    Lipoprotein A (LPA)    Apolipoprotein B      5. Mixed hyperlipidemia  Lipoprotein A (LPA)    Apolipoprotein B         Plan:       Mrs Yost is a 64yo woman with significant ASCVD risk factors, including longstanding T2DM, HTN, hyperlipidemia, and a family history of premature CAD in first degree relatives. She has a severely elevated calcium score of  ~1900, with ~1000 in the LAD/LM alone. She has very concerning symptoms of left arm and chest discomfort with exertion that resolves with rest. While her symptoms seem stable and they resolve with rest, I'm highly concerned she could have multivessel CAD that could require revascularization.  I have recommended that she undergo coronary angiography. I would like her start low dose aspirin 81mg daily. She's on atorvastatin and amlodipine.    Regarding the BP discrepancy between her left and right arms, it's right at the upper limit of what's considered acceptable. We may have to evaluate her left subclavian artery.    Sincerely,       Hermelindo Momin MD

## 2025-05-14 NOTE — Clinical Note
You cath her Monday. I'm concerned she could ultimately need surgery. If she has 3VD will you look at her subclavian artery? She has lower BP on the left than on the right.  ha

## 2025-05-14 NOTE — H&P (VIEW-ONLY)
Date of Office Visit: 25  Encounter Provider: Hermelindo Momin MD  Place of Service: [facility] CARDIOLOGY  Patient Name: Rafiq Yost  :1960    Chief Complaint   Patient presents with    Abnormal Imaging     CT CALCIUM SCORE     :     HPI:     Ms. Yost is 65 y.o. and presents today due to abnormal imaging and chest pain.    She has had T2DM for 30 years. She has never smoked. She has a history of premature CAD in her father and sister. She has hyperlipidemia and hypertension.     She had a calcium score performed in 2025; her score was quite elevated at 1877 (, , , , PDA 18, other 99).     She had surgery to remove a cyst from her neck in April. Anesthesia noted that her SBP differed by ~ 20 points from her left to her right arm, with the left being lower.     For months, she has had predictable exertional heavy discomfort that starts in her left arm and moves into her left chest. It's a squeezing pain and she feels a bit nauseated. She's dyspneic but not diaphoretic. It resolves with rest. It does not occur at rest.     Past Medical History:   Diagnosis Date    Hyperlipidemia     Hypertension     Melanoma     LEFT ARM    Neoplasm of neck     RIGHT    PONV (postoperative nausea and vomiting)     Type 2 diabetes mellitus        Past Surgical History:   Procedure Laterality Date    DILATATION AND CURETTAGE      ECTOPIC PREGNANCY      EXCISION MASS HEAD/NECK N/A 2025    Procedure: Excision of soft tissue neoplasm of right neck;  Surgeon: Joseph Montaño Jr., MD;  Location: [facility];  Service: General;  Laterality: N/A;    HYSTERECTOMY      TEETH EXTRACTION Bilateral 2022       Social History     Socioeconomic History    Marital status:     Number of children: 1   Tobacco Use    Smoking status: Never     Passive exposure: Never    Smokeless tobacco: Never   Vaping Use    Vaping status: Never Used   Substance and Sexual Activity     Alcohol use: No    Drug use: No    Sexual activity: Yes     Partners: Male       Family History   Problem Relation Age of Onset    Diabetes Mother     Hypertension Mother     Thyroid disease Mother     Kidney disease Mother         STAGE 4    Cancer Mother         SKIN     Atrial fibrillation Mother     Heart attack Father     Heart attack Sister     Thyroid disease Sister     Cancer Sister         PITUITARY TUMOR     Thyroid disease Sister     Thyroid disease Sister     Thyroid disease Sister     Diabetes Brother     Hypertension Brother     Hypertension Brother     Hyperlipidemia Brother     Breast cancer Neg Hx     Malig Hyperthermia Neg Hx        Review of Systems   Constitutional: Positive for malaise/fatigue.   Cardiovascular:  Positive for chest pain and dyspnea on exertion.       Allergies   Allergen Reactions    Codeine Mental Status Change     GOOFY ACTING    Cycloset [Bromocriptine] Nausea And Vomiting         Current Outpatient Medications:     amLODIPine (NORVASC) 2.5 MG tablet, Take 1 tablet by mouth Daily. Indications: High Blood Pressure, Disp: 90 tablet, Rfl: 2    atorvastatin (LIPITOR) 80 MG tablet, TAKE 1 TABLET BY MOUTH DAILY., Disp: 90 tablet, Rfl: 2    Benfotiamine 150 MG capsule, Take  by mouth 2 (Two) Times a Day., Disp: , Rfl:     Blood Glucose Monitoring Suppl (FREESTYLE FREEDOM LITE) w/Device kit, U TO TEST BLOOD SUGAR D, Disp: , Rfl:     Cholecalciferol 50 MCG (2000 UT) capsule, Take 1 capsule by mouth Daily., Disp: , Rfl:     Cinnamon 500 MG capsule, Take 2 capsules by mouth Daily., Disp: , Rfl:     cyclobenzaprine (FLEXERIL) 5 MG tablet, TAKE 1 TABLET BY MOUTH THREE TIMES DAILY AS NEEDED FOR MUSCLE SPASMS, Disp: 90 tablet, Rfl: 0    esomeprazole (nexIUM) 20 MG capsule, Take 1 capsule by mouth As Needed., Disp: , Rfl:     GARLIC PO, Take  by mouth Daily., Disp: , Rfl:     glimepiride (AMARYL) 4 MG tablet, 1 tablet twice a day with meals  Indications: Type 2 Diabetes, Disp: 180 tablet,  "Rfl: 2    glucose blood (FreeStyle InsuLinx Test) test strip, 1 each by Other route 2 (Two) Times a Day. Indications: Type 2 Diabetes, Disp: 200 each, Rfl: 3    irbesartan (AVAPRO) 150 MG tablet, TAKE 1 TABLET BY MOUTH DAILY., Disp: 90 tablet, Rfl: 2    Lancets (freestyle) lancets, Dx code E11.9 USE TO TEST BLOOD SUGAR THREE TIMES DAILY, Disp: 300 each, Rfl: 3    Loratadine 10 MG capsule, Take  by mouth As Needed., Disp: , Rfl:     metFORMIN ER (GLUCOPHAGE-XR) 500 MG 24 hr tablet, TAKE 2 TABLETS BY MOUTH TWICE DAILY WITH MEALS, Disp: 360 tablet, Rfl: 2    vitamin B-12 (CYANOCOBALAMIN) 1000 MCG tablet, Take 0.5 tablets by mouth Daily., Disp: , Rfl:     diclofenac (VOLTAREN) 75 MG EC tablet, Take 1 tablet by mouth Every 12 (Twelve) Hours. (Patient not taking: Reported on 5/14/2025), Disp: , Rfl:     Semaglutide,0.25 or 0.5MG/DOS, (OZEMPIC) 2 MG/3ML solution pen-injector, 0.25 mg weekly for 4 weeks then 0.5 mg weekly thereafter. (Patient not taking: Reported on 5/14/2025), Disp: 3 mL, Rfl: 3      Objective:     Vitals:    05/14/25 1401 05/14/25 1409   BP: 142/68 164/70   BP Location: Left arm Right arm   Pulse:  107   SpO2:  98%   Weight: 61.1 kg (134 lb 9.6 oz)    Height: 161.3 cm (63.5\")      Body mass index is 23.47 kg/m².    Vitals reviewed.   Constitutional:       Appearance: Well-developed and not in distress.   Eyes:      Conjunctiva/sclera: Conjunctivae normal.   HENT:      Head: Normocephalic.      Nose: Nose normal.   Neck:      Thyroid: Thyroid normal.      Vascular: No JVD. JVD normal.      Lymphadenopathy: No cervical adenopathy.   Pulmonary:      Effort: Pulmonary effort is normal.      Breath sounds: Normal breath sounds.   Cardiovascular:      Normal rate. Regular rhythm.      Murmurs: There is no murmur.   Pulses:     Intact distal pulses.   Edema:     Peripheral edema absent.   Abdominal:      Palpations: Abdomen is soft.      Tenderness: There is no abdominal tenderness.   Musculoskeletal: Normal " range of motion.      Cervical back: Normal range of motion. Skin:     General: Skin is warm and dry.   Neurological:      General: No focal deficit present.      Mental Status: Alert and oriented to person, place, and time.      Cranial Nerves: No cranial nerve deficit.   Psychiatric:         Behavior: Behavior normal.         Thought Content: Thought content normal.         Judgment: Judgment normal.         Procedures 4/16/2025 NSR, normal EKG        Assessment:       Diagnosis Plan   1. Chronic stable angina  Case Request Cath Lab: Coronary angiography, Left heart cath, Left ventriculography    Basic Metabolic Panel    CBC (No Diff)    Lipoprotein A (LPA)    Apolipoprotein B      2. Unequal blood pressure in upper extremities  Case Request Cath Lab: Coronary angiography, Left heart cath, Left ventriculography    Basic Metabolic Panel    CBC (No Diff)    Lipoprotein A (LPA)    Apolipoprotein B      3. Agatston CAC score, >400  Case Request Cath Lab: Coronary angiography, Left heart cath, Left ventriculography    Basic Metabolic Panel    CBC (No Diff)    Lipoprotein A (LPA)    Apolipoprotein B      4. Family history of premature coronary artery disease  Case Request Cath Lab: Coronary angiography, Left heart cath, Left ventriculography    Basic Metabolic Panel    CBC (No Diff)    Lipoprotein A (LPA)    Apolipoprotein B      5. Mixed hyperlipidemia  Lipoprotein A (LPA)    Apolipoprotein B         Plan:       Mrs Yost is a 64yo woman with significant ASCVD risk factors, including longstanding T2DM, HTN, hyperlipidemia, and a family history of premature CAD in first degree relatives. She has a severely elevated calcium score of  ~1900, with ~1000 in the LAD/LM alone. She has very concerning symptoms of left arm and chest discomfort with exertion that resolves with rest. While her symptoms seem stable and they resolve with rest, I'm highly concerned she could have multivessel CAD that could require revascularization.  I have recommended that she undergo coronary angiography. I would like her start low dose aspirin 81mg daily. She's on atorvastatin and amlodipine.    Regarding the BP discrepancy between her left and right arms, it's right at the upper limit of what's considered acceptable. We may have to evaluate her left subclavian artery.    Sincerely,       Hermelindo Momin MD

## 2025-05-14 NOTE — TELEPHONE ENCOUNTER
Confirmed procedure w/ pt in office. Went over procedure instructions. Pt verbalized understanding of instructions. Instructions also given to pt in office.   Labs done 5.15.2025

## 2025-05-15 ENCOUNTER — LAB (OUTPATIENT)
Facility: HOSPITAL | Age: 65
End: 2025-05-15
Payer: MEDICARE

## 2025-05-15 DIAGNOSIS — Z82.49 FAMILY HISTORY OF PREMATURE CORONARY ARTERY DISEASE: ICD-10-CM

## 2025-05-15 DIAGNOSIS — E78.2 MIXED HYPERLIPIDEMIA: ICD-10-CM

## 2025-05-15 DIAGNOSIS — R93.1 AGATSTON CAC SCORE, >400: ICD-10-CM

## 2025-05-15 DIAGNOSIS — R09.89 UNEQUAL BLOOD PRESSURE IN UPPER EXTREMITIES: ICD-10-CM

## 2025-05-15 DIAGNOSIS — I20.89 CHRONIC STABLE ANGINA: ICD-10-CM

## 2025-05-15 LAB
ANION GAP SERPL CALCULATED.3IONS-SCNC: 12 MMOL/L (ref 5–15)
BUN SERPL-MCNC: 6 MG/DL (ref 8–23)
BUN/CREAT SERPL: 8 (ref 7–25)
CALCIUM SPEC-SCNC: 9.4 MG/DL (ref 8.6–10.5)
CHLORIDE SERPL-SCNC: 99 MMOL/L (ref 98–107)
CO2 SERPL-SCNC: 25 MMOL/L (ref 22–29)
CREAT SERPL-MCNC: 0.75 MG/DL (ref 0.57–1)
DEPRECATED RDW RBC AUTO: 45 FL (ref 37–54)
EGFRCR SERPLBLD CKD-EPI 2021: 87.9 ML/MIN/1.73
ERYTHROCYTE [DISTWIDTH] IN BLOOD BY AUTOMATED COUNT: 15.6 % (ref 12.3–15.4)
GLUCOSE SERPL-MCNC: 152 MG/DL (ref 65–99)
HCT VFR BLD AUTO: 35.2 % (ref 34–46.6)
HGB BLD-MCNC: 11.1 G/DL (ref 12–15.9)
MCH RBC QN AUTO: 25.8 PG (ref 26.6–33)
MCHC RBC AUTO-ENTMCNC: 31.5 G/DL (ref 31.5–35.7)
MCV RBC AUTO: 81.9 FL (ref 79–97)
PLATELET # BLD AUTO: 505 10*3/MM3 (ref 140–450)
PMV BLD AUTO: 10.7 FL (ref 6–12)
POTASSIUM SERPL-SCNC: 4.4 MMOL/L (ref 3.5–5.2)
RBC # BLD AUTO: 4.3 10*6/MM3 (ref 3.77–5.28)
SODIUM SERPL-SCNC: 136 MMOL/L (ref 136–145)
WBC NRBC COR # BLD AUTO: 11.8 10*3/MM3 (ref 3.4–10.8)

## 2025-05-15 PROCEDURE — 85027 COMPLETE CBC AUTOMATED: CPT

## 2025-05-15 PROCEDURE — 83695 ASSAY OF LIPOPROTEIN(A): CPT

## 2025-05-15 PROCEDURE — 80048 BASIC METABOLIC PNL TOTAL CA: CPT

## 2025-05-15 PROCEDURE — 82172 ASSAY OF APOLIPOPROTEIN: CPT

## 2025-05-15 PROCEDURE — 36415 COLL VENOUS BLD VENIPUNCTURE: CPT

## 2025-05-16 LAB — LPA SERPL-SCNC: <8.4 NMOL/L

## 2025-05-17 LAB — APO B SERPL-MCNC: 72 MG/DL

## 2025-05-19 ENCOUNTER — HOSPITAL ENCOUNTER (OUTPATIENT)
Facility: HOSPITAL | Age: 65
Setting detail: HOSPITAL OUTPATIENT SURGERY
Discharge: HOME | End: 2025-05-19
Attending: STUDENT IN AN ORGANIZED HEALTH CARE EDUCATION/TRAINING PROGRAM | Admitting: STUDENT IN AN ORGANIZED HEALTH CARE EDUCATION/TRAINING PROGRAM
Payer: MEDICARE

## 2025-05-19 VITALS
DIASTOLIC BLOOD PRESSURE: 69 MMHG | TEMPERATURE: 98.1 F | HEART RATE: 92 BPM | BODY MASS INDEX: 22.88 KG/M2 | HEIGHT: 64 IN | WEIGHT: 134 LBS | SYSTOLIC BLOOD PRESSURE: 134 MMHG | RESPIRATION RATE: 16 BRPM | OXYGEN SATURATION: 98 %

## 2025-05-19 DIAGNOSIS — I20.89 CHRONIC STABLE ANGINA: ICD-10-CM

## 2025-05-19 DIAGNOSIS — R09.89 UNEQUAL BLOOD PRESSURE IN UPPER EXTREMITIES: ICD-10-CM

## 2025-05-19 DIAGNOSIS — R93.1 AGATSTON CAC SCORE, >400: ICD-10-CM

## 2025-05-19 DIAGNOSIS — Z95.5 STATUS POST INSERTION OF DRUG-ELUTING STENT INTO LEFT ANTERIOR DESCENDING (LAD) ARTERY: Primary | ICD-10-CM

## 2025-05-19 DIAGNOSIS — Z82.49 FAMILY HISTORY OF PREMATURE CORONARY ARTERY DISEASE: ICD-10-CM

## 2025-05-19 LAB — GLUCOSE BLDC GLUCOMTR-MCNC: 159 MG/DL (ref 70–130)

## 2025-05-19 PROCEDURE — 25010000002 NICARDIPINE 2.5 MG/ML SOLUTION: Performed by: STUDENT IN AN ORGANIZED HEALTH CARE EDUCATION/TRAINING PROGRAM

## 2025-05-19 PROCEDURE — 85347 COAGULATION TIME ACTIVATED: CPT

## 2025-05-19 PROCEDURE — C1725 CATH, TRANSLUMIN NON-LASER: HCPCS | Performed by: STUDENT IN AN ORGANIZED HEALTH CARE EDUCATION/TRAINING PROGRAM

## 2025-05-19 PROCEDURE — C1724 CATH, TRANS ATHEREC,ROTATION: HCPCS | Performed by: STUDENT IN AN ORGANIZED HEALTH CARE EDUCATION/TRAINING PROGRAM

## 2025-05-19 PROCEDURE — 25010000002 FENTANYL CITRATE (PF) 50 MCG/ML SOLUTION: Performed by: STUDENT IN AN ORGANIZED HEALTH CARE EDUCATION/TRAINING PROGRAM

## 2025-05-19 PROCEDURE — 25010000002 LIDOCAINE 2% SOLUTION: Performed by: STUDENT IN AN ORGANIZED HEALTH CARE EDUCATION/TRAINING PROGRAM

## 2025-05-19 PROCEDURE — 93458 L HRT ARTERY/VENTRICLE ANGIO: CPT | Performed by: STUDENT IN AN ORGANIZED HEALTH CARE EDUCATION/TRAINING PROGRAM

## 2025-05-19 PROCEDURE — C1887 CATHETER, GUIDING: HCPCS | Performed by: STUDENT IN AN ORGANIZED HEALTH CARE EDUCATION/TRAINING PROGRAM

## 2025-05-19 PROCEDURE — C1894 INTRO/SHEATH, NON-LASER: HCPCS | Performed by: STUDENT IN AN ORGANIZED HEALTH CARE EDUCATION/TRAINING PROGRAM

## 2025-05-19 PROCEDURE — 99153 MOD SED SAME PHYS/QHP EA: CPT | Performed by: STUDENT IN AN ORGANIZED HEALTH CARE EDUCATION/TRAINING PROGRAM

## 2025-05-19 PROCEDURE — 82948 REAGENT STRIP/BLOOD GLUCOSE: CPT

## 2025-05-19 PROCEDURE — 25010000002 HEPARIN (PORCINE) PER 1000 UNITS: Performed by: STUDENT IN AN ORGANIZED HEALTH CARE EDUCATION/TRAINING PROGRAM

## 2025-05-19 PROCEDURE — C1874 STENT, COATED/COV W/DEL SYS: HCPCS | Performed by: STUDENT IN AN ORGANIZED HEALTH CARE EDUCATION/TRAINING PROGRAM

## 2025-05-19 PROCEDURE — 25510000001 IOPAMIDOL PER 1 ML: Performed by: STUDENT IN AN ORGANIZED HEALTH CARE EDUCATION/TRAINING PROGRAM

## 2025-05-19 PROCEDURE — 92978 ENDOLUMINL IVUS OCT C 1ST: CPT | Performed by: STUDENT IN AN ORGANIZED HEALTH CARE EDUCATION/TRAINING PROGRAM

## 2025-05-19 PROCEDURE — C1753 CATH, INTRAVAS ULTRASOUND: HCPCS | Performed by: STUDENT IN AN ORGANIZED HEALTH CARE EDUCATION/TRAINING PROGRAM

## 2025-05-19 PROCEDURE — 25810000003 SODIUM CHLORIDE 0.9 % SOLUTION: Performed by: STUDENT IN AN ORGANIZED HEALTH CARE EDUCATION/TRAINING PROGRAM

## 2025-05-19 PROCEDURE — 25010000002 MIDAZOLAM PER 1 MG: Performed by: STUDENT IN AN ORGANIZED HEALTH CARE EDUCATION/TRAINING PROGRAM

## 2025-05-19 PROCEDURE — 25010000002 PHENYLEPHRINE 10 MG/ML SOLUTION: Performed by: STUDENT IN AN ORGANIZED HEALTH CARE EDUCATION/TRAINING PROGRAM

## 2025-05-19 PROCEDURE — C1769 GUIDE WIRE: HCPCS | Performed by: STUDENT IN AN ORGANIZED HEALTH CARE EDUCATION/TRAINING PROGRAM

## 2025-05-19 PROCEDURE — 99152 MOD SED SAME PHYS/QHP 5/>YRS: CPT | Performed by: STUDENT IN AN ORGANIZED HEALTH CARE EDUCATION/TRAINING PROGRAM

## 2025-05-19 PROCEDURE — C9602 PERC D-E COR STENT ATHER S: HCPCS | Performed by: STUDENT IN AN ORGANIZED HEALTH CARE EDUCATION/TRAINING PROGRAM

## 2025-05-19 DEVICE — XIENCE SKYPOINT™ EVEROLIMUS ELUTING CORONARY STENT SYSTEM 2.75 MM X 38 MM / RAPID-EXCHANGE
Type: IMPLANTABLE DEVICE | Site: CORONARY | Status: FUNCTIONAL
Brand: XIENCE SKYPOINT™

## 2025-05-19 DEVICE — EVEROLIMUS-ELUTING PLATINUM CHROMIUM CORONARY STENT SYSTEM
Type: IMPLANTABLE DEVICE | Site: CORONARY | Status: FUNCTIONAL
Brand: SYNERGY MEGATRON™

## 2025-05-19 RX ORDER — METOPROLOL TARTRATE 25 MG/1
25 TABLET, FILM COATED ORAL 2 TIMES DAILY
Qty: 60 TABLET | Refills: 0 | Status: SHIPPED | OUTPATIENT
Start: 2025-05-19

## 2025-05-19 RX ORDER — FENTANYL CITRATE 50 UG/ML
INJECTION, SOLUTION INTRAMUSCULAR; INTRAVENOUS
Status: DISCONTINUED | OUTPATIENT
Start: 2025-05-19 | End: 2025-05-19 | Stop reason: HOSPADM

## 2025-05-19 RX ORDER — CLOPIDOGREL BISULFATE 75 MG/1
TABLET ORAL
Status: DISCONTINUED | OUTPATIENT
Start: 2025-05-19 | End: 2025-05-19 | Stop reason: HOSPADM

## 2025-05-19 RX ORDER — SODIUM CHLORIDE 9 MG/ML
75 INJECTION, SOLUTION INTRAVENOUS CONTINUOUS
Status: DISCONTINUED | OUTPATIENT
Start: 2025-05-19 | End: 2025-05-19 | Stop reason: HOSPADM

## 2025-05-19 RX ORDER — ASPIRIN 325 MG
TABLET ORAL
Status: DISCONTINUED | OUTPATIENT
Start: 2025-05-19 | End: 2025-05-19 | Stop reason: HOSPADM

## 2025-05-19 RX ORDER — HEPARIN SODIUM 1000 [USP'U]/ML
INJECTION, SOLUTION INTRAVENOUS; SUBCUTANEOUS
Status: DISCONTINUED | OUTPATIENT
Start: 2025-05-19 | End: 2025-05-19 | Stop reason: HOSPADM

## 2025-05-19 RX ORDER — NITROGLYCERIN 0.4 MG/1
TABLET SUBLINGUAL
Qty: 45 TABLET | Refills: 11 | Status: SHIPPED | OUTPATIENT
Start: 2025-05-19

## 2025-05-19 RX ORDER — VERAPAMIL HYDROCHLORIDE 2.5 MG/ML
INJECTION INTRAVENOUS
Status: DISCONTINUED | OUTPATIENT
Start: 2025-05-19 | End: 2025-05-19 | Stop reason: HOSPADM

## 2025-05-19 RX ORDER — ACETAMINOPHEN 325 MG/1
650 TABLET ORAL EVERY 4 HOURS PRN
Status: DISCONTINUED | OUTPATIENT
Start: 2025-05-19 | End: 2025-05-19 | Stop reason: HOSPADM

## 2025-05-19 RX ORDER — SODIUM CHLORIDE 0.9 % (FLUSH) 0.9 %
10 SYRINGE (ML) INJECTION AS NEEDED
Status: DISCONTINUED | OUTPATIENT
Start: 2025-05-19 | End: 2025-05-19 | Stop reason: HOSPADM

## 2025-05-19 RX ORDER — LIDOCAINE HYDROCHLORIDE 20 MG/ML
INJECTION, SOLUTION INFILTRATION; PERINEURAL
Status: DISCONTINUED | OUTPATIENT
Start: 2025-05-19 | End: 2025-05-19 | Stop reason: HOSPADM

## 2025-05-19 RX ORDER — MIDAZOLAM HYDROCHLORIDE 1 MG/ML
INJECTION, SOLUTION INTRAMUSCULAR; INTRAVENOUS
Status: DISCONTINUED | OUTPATIENT
Start: 2025-05-19 | End: 2025-05-19 | Stop reason: HOSPADM

## 2025-05-19 RX ORDER — PHENYLEPHRINE HYDROCHLORIDE 10 MG/ML
INJECTION INTRAVENOUS
Status: DISCONTINUED | OUTPATIENT
Start: 2025-05-19 | End: 2025-05-19 | Stop reason: HOSPADM

## 2025-05-19 RX ORDER — ASPIRIN 81 MG/1
81 TABLET ORAL DAILY
Qty: 90 TABLET | Refills: 3 | Status: SHIPPED | OUTPATIENT
Start: 2025-05-19

## 2025-05-19 RX ORDER — NICARDIPINE HYDROCHLORIDE 2.5 MG/ML
INJECTION INTRAVENOUS
Status: DISCONTINUED | OUTPATIENT
Start: 2025-05-19 | End: 2025-05-19 | Stop reason: HOSPADM

## 2025-05-19 RX ORDER — CLOPIDOGREL BISULFATE 75 MG/1
75 TABLET ORAL DAILY
Qty: 90 TABLET | Refills: 3 | Status: SHIPPED | OUTPATIENT
Start: 2025-05-19

## 2025-05-19 RX ORDER — IOPAMIDOL 755 MG/ML
INJECTION, SOLUTION INTRAVASCULAR
Status: DISCONTINUED | OUTPATIENT
Start: 2025-05-19 | End: 2025-05-19 | Stop reason: HOSPADM

## 2025-05-19 RX ADMIN — SODIUM CHLORIDE 75 ML/HR: 9 INJECTION, SOLUTION INTRAVENOUS at 10:05

## 2025-05-19 NOTE — Clinical Note
First balloon inflation max pressure = 14 shamir. First balloon inflation duration = 20 seconds. Second inflation of balloon - Max pressure = 20 shamir. 2nd Inflation of balloon - Duration = 12 seconds.

## 2025-05-19 NOTE — Clinical Note
Rotational atherectomy was performed. 1st Pass rate = 152 RPM. 1st Pass duration = 21 seconds. 2nd Pass rate = 155 RPM. 2nd Pass duration = 22 seconds. 3rd Pass rate = 154 RPM. 3rd Pass duration = 13 seconds.

## 2025-05-19 NOTE — Clinical Note
First balloon inflation max pressure = 12 shamir. First balloon inflation duration = 6 seconds. Second inflation of balloon - Max pressure = 20 shamir. 2nd Inflation of balloon - Duration = 6 seconds. 2nd inflation was done at 11:37 EDT. Third inflation of balloon - Max pressure = 20 shamir. 3rd Inflation of balloon - Duration = 6 seconds. 3rd inflation was done at 11:37 EDT.

## 2025-05-19 NOTE — PERIOPERATIVE NURSING NOTE
Dr Isabel at bedside showing images from cath/PCI/stent to patient and family. Questions answered.

## 2025-05-19 NOTE — Clinical Note
First balloon inflation max pressure = 12 shamir. First balloon inflation duration = 10 seconds. Second inflation of balloon - Max pressure = 14 shamir. 2nd Inflation of balloon - Duration = 6 seconds. 2nd inflation was done at 11:18 EDT.

## 2025-05-19 NOTE — Clinical Note
Hemostasis started on the right radial artery. R-Band was used in achieving hemostasis. Radial compression device applied to vessel. Hemostasis achieved successfully. Closure device additional comment: 13 shamir of air inserted

## 2025-05-19 NOTE — Clinical Note
First balloon inflation max pressure = 12 shamir. First balloon inflation duration = 5 seconds. Second inflation of balloon - Max pressure = 12 shamir. 2nd Inflation of balloon - Duration = 10 seconds.

## 2025-05-19 NOTE — Clinical Note
First balloon inflation max pressure = 18 shamir. First balloon inflation duration = 6 seconds. Second inflation of balloon - Max pressure = 20 shamir. 2nd Inflation of balloon - Duration = 8 seconds. 2nd inflation was done at 11:22 EDT. Third inflation of balloon - Max pressure = 20 shamir. 3rd Inflation of balloon - Duration = 12 seconds. 3rd inflation was done at 11:23 EDT. Fourth inflation of balloon - Max pressure = 20 shamir. 4th Inflation of  balloon - Duration = 6 seconds. 4th inflation was done at 11:23 EDT.

## 2025-05-19 NOTE — Clinical Note
First balloon inflation max pressure = 12 shamir. First balloon inflation duration = 10 seconds. Second inflation of balloon - Max pressure = 14 shamir. 2nd Inflation of balloon - Duration = 15 seconds. 2nd inflation was done at 11:04 EDT. Third inflation of balloon - Max pressure = 14 shamir. 3rd Inflation of balloon - Duration = 20 seconds. 3rd inflation was done at 11:05 EDT. Fourth inflation of balloon - Max pressure = 20 shamir. 4th Inflation  of balloon - Duration = 6 seconds. 4th inflation was done at 11:06 EDT.

## 2025-05-19 NOTE — DISCHARGE INSTRUCTIONS
[facility]  [address] [place] [address]    Coronary Angioplasty with or without  Stent (Radial Approach) After Care    Refer to this sheet in the next few weeks. These instructions provide you with information on caring for yourself after your procedure. Your health care provider may also give you more specific instructions. Your treatment has been planned according to current medical practices, but problems sometimes occur. Call your health care provider if you have any problems or questions after your procedure.       Home Care Instructions:  You may shower the day after the procedure. Remove the bandage (dressing) and gently wash the site with plain soap and water. Gently pat the site dry. You may apply a band aid daily for 2 days if desired.    Do not apply powder or lotion to the site.  Do not submerge the affected site in water for 3 to 5 days or until the site is completely healed.   Do not flex or bend at the wrist with affected arm for 24 hours.  Do not lift, push or pull anything over 5 pounds for 5 days after your procedure or as directed by your physician.  For a reference, a gallon of milk weighs 8 pounds.    Do not operate machinery or power tools for 24 hours.  Inspect the site at least twice daily. You may notice some bruising at the site and it may be tender for 1 to 2 weeks.     Increase your fluid intake for the next 2 days.    Keep arm elevated for 24 hours.  For the remainder of the day, keep your arm in the “Pledge of Allegiance” position when up and about.    Limit your activity for the next 48 hours and avoid strenuous activity as directed by your physician.   Cardiac Rehab may or may not be ordered.  Please consult with your physician  You may drive 24 hours after the procedure unless otherwise instructed by your caregiver.  A responsible adult should be with you for the first 24 hours after you arrive home.   Do not make any important legal decisions or sign legal  papers for 24 hours. Do not drink alcohol for 24 hours.    Take medicines only as directed by your health care provider.  Blood thinners may be prescribed after your procedure to improve blood flow through the stent.    Metformin or any medications containing Metformin should not be taken for 48 hours after your procedure.    Eat a heart-healthy diet. This should include plenty of fresh fruits and vegetables. Meat should be lean cuts. Avoid the following types of food:    Food that is high in salt.    Canned or highly processed food.    Food that is high in saturated fat or sugar.    Fried food.    Make any other lifestyle changes recommended by your health care provider. This may include:    Not using any tobacco products including cigarettes, chewing tobacco, or electronic cigarettes.   Managing your weight.    Getting regular exercise.    Managing your blood pressure.    Limiting your alcohol intake.    Managing other health problems, such as diabetes.    If you need an MRI after your heart stent was placed, be sure to tell the health care provider who orders the MRI that you have a heart stent.    Keep all follow-up visits as directed by your health care provider.      Call Your Doctor If:    You have unusual pain at the radial/ulnar (wrist) site.  You have redness, warmth, swelling, or pain at the radial/ulnar (wrist) site.  You have drainage (other than a small amount of blood on the dressing).  `You have chills or a fever > 101.  Your arm becomes pale or dark, cool, tingly, or numb.  You develop chest pain, shortness of breath, feel faint or pass out.    You have heavy bleeding from the site.  If you do, hold pressure on the site for 20 minutes.  If the bleeding stops, apply a fresh bandage and call your cardiologist.  However, if you continue to have bleeding, maintain pressure and call 911.    You have any symptoms of a stroke.  Remember BE FAST  B-balance. Sudden trouble walking or loss of  balance.  E-eyes.  Sudden changes in how you see or a sudden onset of a very bad headache.   F-face. Sudden weakness or loss of feeling of the face or facial droop on one side.   A-arms Sudden weakness or numbness in one arm. One arm drifts down if they are both held out in front of you. This happens suddenly and usually on one side of the body.   S-speech.  Sudden trouble speaking, slurred speech or trouble understanding what people are saying.   T-time  Time to call emergency services.  Write down the symptoms and the time they started.

## 2025-05-20 LAB
ACT BLD: 268 SECONDS (ref 82–152)
ACT BLD: 297 SECONDS (ref 82–152)
ACT BLD: 331 SECONDS (ref 82–152)

## 2025-05-21 ENCOUNTER — TELEPHONE (OUTPATIENT)
Dept: CARDIAC REHAB | Facility: HOSPITAL | Age: 65
End: 2025-05-21
Payer: COMMERCIAL

## 2025-05-21 NOTE — TELEPHONE ENCOUNTER
I spoke with the patient re: recent referral to cardiac rehab.  I explained the benefits and logistics of the program.  We also discussed her CAD risk factors and AHA guidelines for regular exercise program.  She prefers to exercise on her own at this time.  I did encourage her to call us if she has questions or decides to participate in the program at a later date.     Thank you!

## 2025-05-28 ENCOUNTER — OFFICE VISIT (OUTPATIENT)
Dept: CARDIOLOGY | Age: 65
End: 2025-05-28
Payer: MEDICARE

## 2025-05-28 VITALS
BODY MASS INDEX: 23.22 KG/M2 | SYSTOLIC BLOOD PRESSURE: 128 MMHG | HEIGHT: 64 IN | DIASTOLIC BLOOD PRESSURE: 76 MMHG | HEART RATE: 86 BPM | WEIGHT: 136 LBS

## 2025-05-28 DIAGNOSIS — I10 PRIMARY HYPERTENSION: ICD-10-CM

## 2025-05-28 DIAGNOSIS — E78.2 MIXED HYPERLIPIDEMIA: ICD-10-CM

## 2025-05-28 DIAGNOSIS — E11.9 TYPE 2 DIABETES MELLITUS WITHOUT COMPLICATION, WITH LONG-TERM CURRENT USE OF INSULIN: ICD-10-CM

## 2025-05-28 DIAGNOSIS — I25.10 CORONARY ARTERY DISEASE INVOLVING NATIVE CORONARY ARTERY OF NATIVE HEART WITHOUT ANGINA PECTORIS: Primary | ICD-10-CM

## 2025-05-28 DIAGNOSIS — Z82.49 FAMILY HISTORY OF PREMATURE CORONARY ARTERY DISEASE: ICD-10-CM

## 2025-05-28 DIAGNOSIS — R09.89 UNEQUAL BLOOD PRESSURE IN UPPER EXTREMITIES: ICD-10-CM

## 2025-05-28 DIAGNOSIS — Z79.4 TYPE 2 DIABETES MELLITUS WITHOUT COMPLICATION, WITH LONG-TERM CURRENT USE OF INSULIN: ICD-10-CM

## 2025-05-28 PROBLEM — R93.1 AGATSTON CAC SCORE, >400: Status: RESOLVED | Noted: 2025-05-14 | Resolved: 2025-05-28

## 2025-05-28 PROBLEM — I20.89 CHRONIC STABLE ANGINA: Status: RESOLVED | Noted: 2025-05-14 | Resolved: 2025-05-28

## 2025-05-28 RX ORDER — METOPROLOL TARTRATE 25 MG/1
25 TABLET, FILM COATED ORAL 2 TIMES DAILY
Qty: 180 TABLET | Refills: 1 | Status: SHIPPED | OUTPATIENT
Start: 2025-05-28

## 2025-05-28 NOTE — PROGRESS NOTES
Date of Office Visit: 2025  Encounter Provider: TOMMY Tolbert  Place of Service: [facility] CARDIOLOGY  Patient Name: Rafiq Yost  :1960  Primary Cardiologist: Dr. Hermelindo Momin     Chief Complaint   Patient presents with    Hospital Follow Up Visit    Coronary Artery Disease   :     HPI: Rafiq Yost is a 65 y.o. female who presents today for cardiac follow-up visit.  She is a new patient to me and I have reviewed her medical records.    She has known hypertension, hyperlipidemia, and type 2 diabetes mellitus.  She has a history of premature CAD in her father ( at age 50 of MI) and sister (CAD, CABG at age 45).    In 2025, CT calcium score was 1877 (, , , , PDA 18, other 99).     In May 2025, she saw Dr. Momin for exertional left arm heaviness radiating to her left chest that she described as a squeezing pain with minimal exertion and rest.  Her symptoms were accompanied with nausea and dyspnea and symptoms resolved with rest.  She was started on aspirin 81 mg daily and recommended to undergo coronary angiography.  She was noted to have unequal blood pressure readings with the right arm 164/70 and left arm 142/68.    Coronary angiography revealed normal left main, 95% proximal LAD stenosis, diagonal 90% ostial stenosis, distal LAD luminal irregularities, 20% proximal first OM stenosis, second OM 20% proximal stenosis, distal circumflex 60-70% stenosis, AND RCA mild diffuse disease.  She underwent rotational atherectomy, IVUS, and overlapping drug-eluting stent placement to the proximal LAD (2.75 x 38 mm Xience and 3.5 x 16 mm Synergy Megatron).  Medical treatment recommended for moderate to severe distal circumflex stenosis.  Dr. Isabel noted if patient's system persist, can consider PCI to distal circumflex.    She presents today for hospital follow-up visit.  She reports that she has had left arm pain and left chest pain since  2021.  Her symptoms were progressively worsening and saw Dr. Momin.  After intervention, her left arm pain and left chest pain have completely resolved.  Her blood pressures are now equal in arms bilaterally.  She reports a mild lightheadedness with position changes.  She denies shortness of breath, palpitations, edema, syncope, or bleeding.      Past Medical History:   Diagnosis Date    Coronary artery disease 05/2025    Status post stent placement x 2 to LAD    Hyperlipidemia     Hypertension     Melanoma     LEFT ARM    Neoplasm of neck     RIGHT    PONV (postoperative nausea and vomiting)     Type 2 diabetes mellitus        Past Surgical History:   Procedure Laterality Date    CARDIAC CATHETERIZATION N/A 5/19/2025    Procedure: Coronary angiography;  Surgeon: Ramy Isabel MD;  Location: [facility] CATH INVASIVE LOCATION;  Service: Cardiovascular;  Laterality: N/A;    CARDIAC CATHETERIZATION N/A 5/19/2025    Procedure: Left heart cath;  Surgeon: Ramy Isabel MD;  [facility];  Service: Cardiovascular;  Laterality: N/A;    CARDIAC CATHETERIZATION N/A 5/19/2025    Procedure: Left ventriculography;  Surgeon: Ramy Isabel MD;  [facility];  Service: Cardiovascular;  Laterality: N/A;    CARDIAC CATHETERIZATION N/A 5/19/2025    Procedure: Percutaneous Coronary Intervention;  Surgeon: Ramy Isabel MD;  [facility];  Service: Cardiovascular;  Laterality: N/A;    CARDIAC CATHETERIZATION N/A 5/19/2025    Procedure: Stent SAUNDRA coronary;  Surgeon: Ramy Isabel MD;  [facility];  Service: Cardiovascular;  Laterality: N/A;    CARDIAC CATHETERIZATION N/A 5/19/2025    Procedure: Atherectomy-coronary;  Surgeon: Ramy Isabel MD;  [facility];  Service: Cardiovascular;  Laterality: N/A;    DILATATION AND CURETTAGE      ECTOPIC PREGNANCY      EXCISION MASS HEAD/NECK N/A 04/23/2025    Procedure: Excision of  soft tissue neoplasm of right neck;  Surgeon: Joseph Montaño Jr., MD;  Location: [facility] MAIN OR;  Service: General;  Laterality: N/A;    HYSTERECTOMY      partial    INTRAVASCULAR ULTRASOUND N/A 5/19/2025    Procedure: Intravascular Ultrasound;  Surgeon: Ramy Isabel MD;  Location: [facility] CATH INVASIVE LOCATION;  Service: Cardiovascular;  Laterality: N/A;    TEETH EXTRACTION Bilateral 05/2022       Social History     Socioeconomic History    Marital status:     Number of children: 1   Tobacco Use    Smoking status: Never     Passive exposure: Never    Smokeless tobacco: Never   Vaping Use    Vaping status: Never Used   Substance and Sexual Activity    Alcohol use: No    Drug use: No    Sexual activity: Defer     Partners: Male       Family History   Problem Relation Age of Onset    Diabetes Mother     Hypertension Mother     Thyroid disease Mother     Kidney disease Mother         STAGE 4    Cancer Mother         SKIN     Atrial fibrillation Mother     Heart attack Father     Heart attack Sister     Thyroid disease Sister     Cancer Sister         PITUITARY TUMOR     Thyroid disease Sister     Thyroid disease Sister     Thyroid disease Sister     Diabetes Brother     Hypertension Brother     Hypertension Brother     Hyperlipidemia Brother     Breast cancer Neg Hx     Malig Hyperthermia Neg Hx        The following portion of the patient's history were reviewed and updated as appropriate: past medical history, past surgical history, past social history, past family history, allergies, current medications, and problem list.    Review of Systems   Constitutional: Negative.   Cardiovascular: Negative.    Respiratory: Negative.     Neurological:  Positive for light-headedness.       Allergies   Allergen Reactions    Codeine Mental Status Change     GOOFY ACTING    Cycloset [Bromocriptine] Nausea And Vomiting         Current Outpatient Medications:     amLODIPine (NORVASC) 2.5 MG tablet, Take 1 tablet by mouth  Daily. Indications: High Blood Pressure, Disp: 90 tablet, Rfl: 2    aspirin 81 MG EC tablet, Take 1 tablet by mouth Daily., Disp: 90 tablet, Rfl: 3    atorvastatin (LIPITOR) 80 MG tablet, TAKE 1 TABLET BY MOUTH DAILY., Disp: 90 tablet, Rfl: 2    Benfotiamine 150 MG capsule, Take  by mouth 2 (Two) Times a Day., Disp: , Rfl:     Blood Glucose Monitoring Suppl (FREESTYLE FREEDOM LITE) w/Device kit, U TO TEST BLOOD SUGAR D, Disp: , Rfl:     Cholecalciferol 50 MCG (2000 UT) capsule, Take 1 capsule by mouth Daily., Disp: , Rfl:     Cinnamon 500 MG capsule, Take 2 capsules by mouth Daily., Disp: , Rfl:     clopidogrel (PLAVIX) 75 MG tablet, Take 1 tablet by mouth Daily., Disp: 90 tablet, Rfl: 3    cyclobenzaprine (FLEXERIL) 5 MG tablet, TAKE 1 TABLET BY MOUTH THREE TIMES DAILY AS NEEDED FOR MUSCLE SPASMS, Disp: 90 tablet, Rfl: 0    esomeprazole (nexIUM) 20 MG capsule, Take 1 capsule by mouth As Needed., Disp: , Rfl:     GARLIC PO, Take  by mouth Daily., Disp: , Rfl:     glimepiride (AMARYL) 4 MG tablet, 1 tablet twice a day with meals  Indications: Type 2 Diabetes, Disp: 180 tablet, Rfl: 2    glucose blood (FreeStyle InsuLinx Test) test strip, 1 each by Other route 2 (Two) Times a Day. Indications: Type 2 Diabetes, Disp: 200 each, Rfl: 3    irbesartan (AVAPRO) 150 MG tablet, TAKE 1 TABLET BY MOUTH DAILY., Disp: 90 tablet, Rfl: 2    Lancets (freestyle) lancets, Dx code E11.9 USE TO TEST BLOOD SUGAR THREE TIMES DAILY, Disp: 300 each, Rfl: 3    Loratadine 10 MG capsule, Take  by mouth As Needed., Disp: , Rfl:     metFORMIN ER (GLUCOPHAGE-XR) 500 MG 24 hr tablet, TAKE 2 TABLETS BY MOUTH TWICE DAILY WITH MEALS, Disp: 360 tablet, Rfl: 2    metoprolol tartrate (LOPRESSOR) 25 MG tablet, Take 1 tablet by mouth 2 (Two) Times a Day., Disp: 180 tablet, Rfl: 1    nitroglycerin (NITROSTAT) 0.4 MG SL tablet, 1 under the tongue as needed for angina, may repeat q5mins for up three doses, Disp: 45 tablet, Rfl: 11    Semaglutide,0.25 or  "0.5MG/DOS, (OZEMPIC) 2 MG/3ML solution pen-injector, 0.25 mg weekly for 4 weeks then 0.5 mg weekly thereafter., Disp: 3 mL, Rfl: 3    vitamin B-12 (CYANOCOBALAMIN) 1000 MCG tablet, Take 0.5 tablets by mouth Daily., Disp: , Rfl:          Objective:     Vitals:    05/28/25 1450 05/28/25 1515 05/28/25 1517   BP: 138/82 132/82 128/76   BP Location: Right arm Right arm Left arm   Patient Position: Sitting Sitting Sitting   Cuff Size: Small Adult Adult Adult   Pulse: 86     Weight: 61.7 kg (136 lb)     Height: 162.6 cm (64\")       Body mass index is 23.34 kg/m².    PHYSICAL EXAM:    Vitals Reviewed.   General Appearance: No acute distress, well developed and well nourished.   HENT: No hearing loss noted.    Respiratory: No signs of respiratory distress. Respiration rhythm and depth normal.  Clear to auscultation.   Cardiovascular:  Jugular Venous Pressure: Normal  Heart Rate and Rhythm: Normal, Heart Sounds: Normal S1 and S2. No S3 or S4 noted.  Murmurs: No murmurs noted. No rubs, thrills, or gallops.   Lower Extremities: No edema noted.  Right arm cath site: Small not present.  Residual bruising.  Musculoskeletal: Normal movement of extremities.  Skin: General appearance normal.    Psychiatric: Patient alert and oriented to person, place, and time. Speech and behavior appropriate. Normal mood and affect.       ECG 12 Lead    Date/Time: 5/28/2025 2:54 PM  Performed by: Deirdre Lundberg APRN    Authorized by: Deirdre Lundberg APRN  Comparison: compared with previous ECG from 4/16/2025  Similar to previous ECG  Rhythm: sinus rhythm  Rate: normal  BPM: 78  Conduction: conduction normal  ST Segments: ST segments normal  T Waves: T waves normal  QRS axis: normal    Clinical impression: normal ECG            Assessment:       Diagnosis Plan   1. Coronary artery disease involving native coronary artery of native heart without angina pectoris        2. Family history of premature coronary artery disease        3. Unequal blood " pressure in upper extremities        4. Primary hypertension        5. Mixed hyperlipidemia        6. Type 2 diabetes mellitus without complication, with long-term current use of insulin               Plan:       1.  Coronary Artery Disease: Angina symptoms were left arm pain and chest pain.  Status post stent placement x 2 to the LAD.  Denies further angina.  Continue DAPT (aspirin and clopidogrel) and atorvastatin.  Risk factor modification discussed.  She is doing great.  She walks frequently and prefers not to participate in cardiac rehab.    2.  Family history of premature coronary artery disease in her father and sister.    3.  Unequal blood pressure readings.  Resolved with PCI.    4.  Hypertension: Blood pressure well-controlled today.    5.  Hyperlipidemia: On atorvastatin.    6.  Type 2 diabetes mellitus.  I stressed the importance of good blood sugar control.    7.  She will call with any further cardiac concerns.  Follow-up with Dr. Momin in 3 months.    As always, it has been a pleasure to participate in your patient's care. Thank you.         Sincerely,         TOMMY Santoro  [facility] Cardiology      Dictated utilizing Dragon Dictation  I spent 30 minutes reviewing her medical records/testing/previous office notes/labs, face-to-face interaction with patient, physical examination, formulating the plan of care, and discussion of plan of care with patient.

## 2025-06-13 ENCOUNTER — PATIENT MESSAGE (OUTPATIENT)
Dept: ENDOCRINOLOGY | Age: 65
End: 2025-06-13
Payer: COMMERCIAL

## 2025-06-13 RX ORDER — IRBESARTAN 150 MG/1
150 TABLET ORAL DAILY
Qty: 90 TABLET | Refills: 2 | Status: SHIPPED | OUTPATIENT
Start: 2025-06-13

## 2025-07-11 DIAGNOSIS — I10 ESSENTIAL HYPERTENSION: ICD-10-CM

## 2025-07-11 RX ORDER — AMLODIPINE BESYLATE 2.5 MG/1
2.5 TABLET ORAL DAILY
Qty: 90 TABLET | Refills: 0 | Status: SHIPPED | OUTPATIENT
Start: 2025-07-11

## 2025-07-11 NOTE — TELEPHONE ENCOUNTER
Rx Refill Note  Requested Prescriptions     Pending Prescriptions Disp Refills    amLODIPine (NORVASC) 2.5 MG tablet [Pharmacy Med Name: AMLODIPINE BESYLATE 2.5MG TABLETS] 90 tablet 2     Sig: TAKE 1 TABLET BY MOUTH DAILY FOR HIGH BLOOD PRESSURE      Last office visit with prescribing clinician: 5/2/2025   Last telemedicine visit with prescribing clinician: Visit date not found   Next office visit with prescribing clinician: 9/22/2025                         Would you like a call back once the refill request has been completed: [] Yes [] No    If the office needs to give you a call back, can they leave a voicemail: [] Yes [] No    Cara Bills  07/11/25, 08:19 EDT  Cara Bills  7/11/2025  08:19 EDT

## 2025-08-01 ENCOUNTER — OFFICE VISIT (OUTPATIENT)
Dept: INTERNAL MEDICINE | Facility: CLINIC | Age: 65
End: 2025-08-01
Payer: MEDICARE

## 2025-08-01 VITALS
SYSTOLIC BLOOD PRESSURE: 128 MMHG | HEART RATE: 84 BPM | HEIGHT: 64 IN | WEIGHT: 135 LBS | BODY MASS INDEX: 23.05 KG/M2 | DIASTOLIC BLOOD PRESSURE: 74 MMHG | OXYGEN SATURATION: 99 %

## 2025-08-01 DIAGNOSIS — Z00.00 MEDICARE ANNUAL WELLNESS VISIT, SUBSEQUENT: Primary | ICD-10-CM

## 2025-08-01 DIAGNOSIS — Z79.4 TYPE 2 DIABETES MELLITUS WITHOUT COMPLICATION, WITH LONG-TERM CURRENT USE OF INSULIN: ICD-10-CM

## 2025-08-01 DIAGNOSIS — E78.2 MIXED HYPERLIPIDEMIA: ICD-10-CM

## 2025-08-01 DIAGNOSIS — I25.10 CORONARY ARTERY DISEASE INVOLVING NATIVE CORONARY ARTERY OF NATIVE HEART WITHOUT ANGINA PECTORIS: ICD-10-CM

## 2025-08-01 DIAGNOSIS — E11.9 TYPE 2 DIABETES MELLITUS WITHOUT COMPLICATION, WITH LONG-TERM CURRENT USE OF INSULIN: ICD-10-CM

## 2025-08-01 NOTE — PROGRESS NOTES
Subjective   The ABCs of the Annual Wellness Visit  Medicare Wellness Visit      Rafiq Yost is a 65 y.o. patient who presents for a Medicare Wellness Visit.    The following portions of the patient's history were reviewed and   updated as appropriate: allergies, current medications, past family history, past medical history, past social history, past surgical history, and problem list.    Compared to one year ago, the patient's physical   health is better.  Compared to one year ago, the patient's mental   health is the same.    Recent Hospitalizations:  She was admitted within the past 365 days at [facility].     Current Medical Providers:  Patient Care Team:  Jeremy Yanes MD as PCP - General (Internal Medicine)  Iglesia oDuglas MD as Consulting Physician (Endocrinology)  Nicki Rogers APRN as Nurse Practitioner (Family Medicine)  Hermelindo Momin MD as Cardiologist (Cardiology)    Outpatient Medications Prior to Visit   Medication Sig Dispense Refill    amLODIPine (NORVASC) 2.5 MG tablet TAKE 1 TABLET BY MOUTH DAILY FOR HIGH BLOOD PRESSURE 90 tablet 0    aspirin 81 MG EC tablet Take 1 tablet by mouth Daily. 90 tablet 3    atorvastatin (LIPITOR) 80 MG tablet TAKE 1 TABLET BY MOUTH DAILY. 90 tablet 2    Benfotiamine 150 MG capsule Take  by mouth 2 (Two) Times a Day.      Blood Glucose Monitoring Suppl (FREESTYLE FREEDOM LITE) w/Device kit U TO TEST BLOOD SUGAR D      Cholecalciferol 50 MCG (2000 UT) capsule Take 1 capsule by mouth Daily.      Cinnamon 500 MG capsule Take 2 capsules by mouth Daily.      clopidogrel (PLAVIX) 75 MG tablet Take 1 tablet by mouth Daily. 90 tablet 3    cyclobenzaprine (FLEXERIL) 5 MG tablet TAKE 1 TABLET BY MOUTH THREE TIMES DAILY AS NEEDED FOR MUSCLE SPASMS 90 tablet 0    esomeprazole (nexIUM) 20 MG capsule Take 1 capsule by mouth As Needed.      GARLIC PO Take  by mouth Daily.      glimepiride (AMARYL) 4 MG tablet 1 tablet twice a day with meals  Indications: Type 2  Diabetes 180 tablet 2    glucose blood (FreeStyle InsuLinx Test) test strip 1 each by Other route 2 (Two) Times a Day. Indications: Type 2 Diabetes 200 each 3    irbesartan (AVAPRO) 150 MG tablet Take 1 tablet by mouth Daily. 90 tablet 2    Lancets (freestyle) lancets Dx code E11.9 USE TO TEST BLOOD SUGAR THREE TIMES DAILY 300 each 3    Loratadine 10 MG capsule Take  by mouth As Needed.      metFORMIN ER (GLUCOPHAGE-XR) 500 MG 24 hr tablet TAKE 2 TABLETS BY MOUTH TWICE DAILY WITH MEALS 360 tablet 2    metoprolol tartrate (LOPRESSOR) 25 MG tablet Take 1 tablet by mouth 2 (Two) Times a Day. 180 tablet 1    nitroglycerin (NITROSTAT) 0.4 MG SL tablet 1 under the tongue as needed for angina, may repeat q5mins for up three doses 45 tablet 11    Semaglutide,0.25 or 0.5MG/DOS, (OZEMPIC) 2 MG/3ML solution pen-injector 0.25 mg weekly for 4 weeks then 0.5 mg weekly thereafter. 3 mL 3    vitamin B-12 (CYANOCOBALAMIN) 1000 MCG tablet Take 0.5 tablets by mouth Daily.       No facility-administered medications prior to visit.     No opioid medication identified on active medication list. I have reviewed chart for other potential  high risk medication/s and harmful drug interactions in the elderly.      Aspirin is on active medication list. Aspirin use is indicated based on review of current medical condition/s. Pros and cons of this therapy have been discussed today. Benefits of this medication outweigh potential harm.  Patient has been encouraged to continue taking this medication.  .      Patient Active Problem List   Diagnosis    Hyperlipidemia    Vitamin D deficiency    Skull defect    Arthralgia of left hip    Type 2 diabetes mellitus without complication, with long-term current use of insulin    Colon cancer screening    Osteopenia of multiple sites    Degeneration of cervical intervertebral disc    Cervical radiculopathy    Sebaceous cyst    Family history of premature coronary artery disease    Coronary artery disease  "    Advance Care Planning Advance Directive is on file.  ACP discussion was held with the patient during this visit. Patient has an advance directive in EMR which is still valid.             Objective   Vitals:    25 0904   BP: 128/74   Pulse: 84   SpO2: 99%   Weight: 61.2 kg (135 lb)   Height: 162.6 cm (64.02\")       Estimated body mass index is 23.16 kg/m² as calculated from the following:    Height as of this encounter: 162.6 cm (64.02\").    Weight as of this encounter: 61.2 kg (135 lb).    BMI is within normal parameters. No other follow-up for BMI required.           Does the patient have evidence of cognitive impairment? No                                                                                                Health  Risk Assessment    Smoking Status:  Social History     Tobacco Use   Smoking Status Never    Passive exposure: Never   Smokeless Tobacco Never     Alcohol Consumption:  Social History     Substance and Sexual Activity   Alcohol Use No       Fall Risk Screen  STEADI Fall Risk Assessment was completed, and patient is at LOW risk for falls.Assessment completed on:2025    Depression Screening   Little interest or pleasure in doing things? Not at all   Feeling down, depressed, or hopeless? Not at all   PHQ-2 Total Score 0      Health Habits and Functional and Cognitive Screenin/1/2025     9:00 AM   Functional & Cognitive Status   Do you have difficulty preparing food and eating? No   Do you have difficulty bathing yourself, getting dressed or grooming yourself? No   Do you have difficulty using the toilet? No   Do you have difficulty moving around from place to place? No   Do you have trouble with steps or getting out of a bed or a chair? No   Current Diet Well Balanced Diet   Dental Exam Up to date   Eye Exam Up to date   Exercise (times per week) 6 times per week   Current Exercises Include Walking   Do you need help using the phone?  No   Are you deaf or do you have " serious difficulty hearing?  No   Do you need help to go to places out of walking distance? No   Do you need help shopping? No   Do you need help preparing meals?  No   Do you need help with housework?  No   Do you need help with laundry? No   Do you need help taking your medications? No   Do you need help managing money? No   Do you ever drive or ride in a car without wearing a seat belt? No   Have you felt unusual fatigue (could be tiredness), stress, anger or loneliness in the last month? No   Who do you live with? Spouse   If you need help, do you have trouble finding someone available to you? No   Have you been bothered in the last four weeks by sexual problems? No   Do you have difficulty concentrating, remembering or making decisions? No           Age-appropriate Screening Schedule:  Refer to the list below for future screening recommendations based on patient's age, sex and/or medical conditions. Orders for these recommended tests are listed in the plan section. The patient has been provided with a written plan.    Health Maintenance List  Health Maintenance   Topic Date Due    ZOSTER VACCINE (1 of 2) Never done    MAMMOGRAM  08/31/2022    COVID-19 Vaccine (3 - 2024-25 season) 09/01/2024    DIABETIC EYE EXAM  08/15/2025    Pneumococcal Vaccine 50+ (1 of 2 - PCV) 02/27/2026 (Originally 2/3/1979)    INFLUENZA VACCINE  10/01/2025    HEMOGLOBIN A1C  11/02/2025    DIABETIC FOOT EXAM  01/03/2026    LIPID PANEL  05/02/2026    URINE MICROALBUMIN-CREATININE RATIO (uACR)  05/02/2026    ANNUAL WELLNESS VISIT  08/01/2026    DXA SCAN  09/26/2026    TDAP/TD VACCINES (2 - Td or Tdap) 01/09/2027    HEPATITIS C SCREENING  Addressed    COLORECTAL CANCER SCREENING  Discontinued                                                                                                                                                CMS Preventative Services Quick Reference  Risk Factors Identified During Encounter  Immunizations  "Discussed/Encouraged: Shingrix and COVID19  Dental Screening Recommended  Vision Screening Recommended    The above risks/problems have been discussed with the patient.  Pertinent information has been shared with the patient in the After Visit Summary.  An After Visit Summary and PPPS were made available to the patient.    Follow Up:   Next Medicare Wellness visit to be scheduled in 1 year.         Additional E&M Note during same encounter follows:  Patient has additional, significant, and separately identifiable condition(s)/problem(s) that require work above and beyond the Medicare Wellness Visit     Chief Complaint  Establish Care and Medicare Wellness-Initial Visit    Subjective   HPI  Rafiq is also being seen today to establish care for management of chronic medical conditions    She has no specific acute complaints or concerns today, medical history is notable for CAD s/p PCI in May of this year after having angina for months. She was started on DAPT currently still on it tenatively at minimum for 6 months     She also reports hx of T2DM and follows with end[facility]ology [facility] at [facility] with Dr. Douglas, on ozempic, glimepiride and metformin.                 Objective   Vital Signs:  /74   Pulse 84   Ht 162.6 cm (64.02\")   Wt 61.2 kg (135 lb)   SpO2 99%   BMI 23.16 kg/m²   Physical Exam  Vitals reviewed.   Constitutional:       General: She is not in acute distress.     Appearance: Normal appearance. She is not toxic-appearing.   HENT:      Head: Normocephalic and atraumatic.   Eyes:      General: No scleral icterus.     Conjunctiva/sclera: Conjunctivae normal.   Cardiovascular:      Rate and Rhythm: Normal rate and regular rhythm.      Heart sounds: Normal heart sounds.   Pulmonary:      Effort: Pulmonary effort is normal. No respiratory distress.      Breath sounds: No wheezing, rhonchi or rales.   Abdominal:      General: Abdomen is flat. There is no distension.      Palpations: Abdomen is soft.      " Tenderness: There is no abdominal tenderness.   Skin:     General: Skin is warm and dry.   Neurological:      General: No focal deficit present.      Mental Status: She is alert and oriented to person, place, and time.      Motor: No weakness.      Gait: Gait normal.   Psychiatric:         Mood and Affect: Mood normal.         Behavior: Behavior normal.              Assessment and Plan         Medicare annual wellness visit, subsequent    Coronary artery disease involving native coronary artery of native heart without angina pectoris    Mixed hyperlipidemia     Type 2 diabetes mellitus without complication, with long-term current use of insulin    Diagnoses and all orders for this visit:    1. Medicare annual wellness visit, subsequent (Primary)    2. Coronary artery disease involving native coronary artery of native heart without angina pectoris  Overview:  Status post stent placement x 2 to LAD    Assessment & Plan:        3. Mixed hyperlipidemia  Assessment & Plan:         4. Type 2 diabetes mellitus without complication, with long-term current use of insulin  Assessment & Plan:           Reviewed previous PCP, cardiology and recent [facility] report, and endocrinology OV, labs dating back to 1/2025     Majority of her care and chronic conditions are managed by specialists, currently not having any symptoms of persistent angina since stent placed. Has close follow up with cardiology    Diabetes managed by endocrinology, appears stable    We will have her come back  in 6mo to see how she is doing given initial visit today and make sure no recurrent chest pain. She gets labs through endocrinology so will follow these up                  Follow Up   Return in about 6 months (around 2/1/2026).  Patient was given instructions and counseling regarding her condition or for health maintenance advice. Please see specific information pulled into the AVS if appropriate.

## 2025-08-01 NOTE — PROGRESS NOTES
"Chief Complaint  Establish Care    Subjective    {Problem List  Visit Diagnosis   Encounters  Notes  Medications  Labs  Result Review Imaging  Media :23}    Rafiq Yost presents to [facility]  History of Present Illness    Objective   Vital Signs:  /74   Pulse 84   Ht 162.6 cm (64.02\")   Wt 61.2 kg (135 lb)   SpO2 99%   BMI 23.16 kg/m²   Estimated body mass index is 23.16 kg/m² as calculated from the following:    Height as of this encounter: 162.6 cm (64.02\").    Weight as of this encounter: 61.2 kg (135 lb).    BMI is within normal parameters. No other follow-up for BMI required.      Physical Exam   Result Review :{Labs  Result Review  Imaging  Med Tab  Media  Procedures :23}  {The following data was reviewed by (Optional):69115}  {Ambulatory Labs (Optional):37669}  {Data reviewed (Optional):40529:::1}          Assessment and Plan {CC Problem List  Visit Diagnosis   ROS  Review (Popup)  Health Maintenance  Quality  BestPractice  Medications  SmartSets  SnapShot Encounters  Media :23}  There are no diagnoses linked to this encounter.       {Time Spent (Optional):54385}  Follow Up {Instructions Charge Capture  Follow-up Communications :23}  No follow-ups on file.  Patient was given instructions and counseling regarding her condition or for health maintenance advice. Please see specific information pulled into the AVS if appropriate.             "

## 2025-08-11 DIAGNOSIS — Z79.4 TYPE 2 DIABETES MELLITUS WITHOUT COMPLICATION, WITH LONG-TERM CURRENT USE OF INSULIN: ICD-10-CM

## 2025-08-11 DIAGNOSIS — E11.9 TYPE 2 DIABETES MELLITUS WITHOUT COMPLICATION, WITH LONG-TERM CURRENT USE OF INSULIN: ICD-10-CM

## 2025-08-11 RX ORDER — METFORMIN HYDROCHLORIDE 500 MG/1
1000 TABLET, EXTENDED RELEASE ORAL 2 TIMES DAILY WITH MEALS
Qty: 360 TABLET | Refills: 2 | Status: SHIPPED | OUTPATIENT
Start: 2025-08-11

## (undated) DEVICE — COPILOT BLEEDBACK CONTROL VALVE: Brand: COPILOT

## (undated) DEVICE — Device: Brand: ASAHI SION BLUE

## (undated) DEVICE — PRE-CONNECTED EXCHANGEABLE BURR CATHETER AND BURR ADVANCING DEVICE: Brand: ROTAPRO™

## (undated) DEVICE — SKIN PREP TRAY W/CHG: Brand: MEDLINE INDUSTRIES, INC.

## (undated) DEVICE — PK CATH CARD 40

## (undated) DEVICE — Device

## (undated) DEVICE — CATH GUIDE LAUNCHER EBU3.5 6F 100CM

## (undated) DEVICE — THE TURNPIKE CATHETERS ARE INTENDED TO BE USED TO ACCESS DISCRETE REGIONS OF THE CORONARY AND/OR PERIPHERAL VASCULATURE. THEY MAY BE USED TO FACILITATE PLACEMENT AND EXCHANGE OF GUIDEWIRES AND TO SUBSELECTIVELY INFUSE/DELIVER DIAGNOSTIC AND THERAPEUTIC AGENTS.: Brand: TURNPIKE® LP CATHETER

## (undated) DEVICE — DGW .035 FC J3MM 260CM TEF: Brand: EMERALD

## (undated) DEVICE — FEMORAL ENTRY ANGIOGRAPHY SHIELD-YELLOW: Brand: RADPAD

## (undated) DEVICE — PENCL SMOKE/EVAC MEGADYNE TELESCP 10FT

## (undated) DEVICE — STRIP CLS WND CURAD MEDI/STRIP HYPOALLERG 0.25X4IN PK/10

## (undated) DEVICE — SYR LL TP 10ML STRL

## (undated) DEVICE — PK ENT 40

## (undated) DEVICE — GLV SURG PREMIERPRO ORTHO LTX PF SZ7.5 BRN

## (undated) DEVICE — GW ATHRCTMY ROTAWIRE DRV FLOP W/WIRECLIP/TORQ FLX 330CM

## (undated) DEVICE — LUBE ROTAGLIDE BURRCATH SYS

## (undated) DEVICE — CATH DIAG EXPO .045 FL3  5F 100CM

## (undated) DEVICE — NC TREK NEO™ CORONARY DILATATION CATHETER 3.00 MM X 15 MM / RAPID-EXCHANGE: Brand: NC TREK NEO™

## (undated) DEVICE — CORONARY IMAGING CATHETER: Brand: OPTICROSS™ 6 HD

## (undated) DEVICE — GLIDESHEATH SLENDER STAINLESS STEEL KIT: Brand: GLIDESHEATH SLENDER

## (undated) DEVICE — CATH F5 INF PIG145 110CM 6SH: Brand: INFINITI

## (undated) DEVICE — KT MANIFLD CARDIAC

## (undated) DEVICE — CATH DIAG DXTERITY ULTRA TRANSRADIAL 4.0 5F 100CM 0/SH

## (undated) DEVICE — DEV INDEFLATOR P/N 580289

## (undated) DEVICE — THE VASC BAND HEMOSTAT IS A COMPRESSION DEVICE TO ASSIST HEMOSTASIS OF ARTERIAL, VENOUS AND HEMODIALYSIS PERCUTANEOUS ACCESS SITES.: Brand: VASC BAND™ HEMOSTAT

## (undated) DEVICE — GUIDELINER CATHETERS ARE INTENDED TO BE USED IN CONJUNCTION WITH GUIDE CATHETERS TO ACCESS DISCRETE REGIONS OF THE CORONARY AND/OR PERIPHERAL VASCULATURE, AND TO FACILITATE PLACEMENT OF INTERVENTIONAL DEVICES.: Brand: GUIDELINER® V3 CATHETER

## (undated) DEVICE — NC TREK NEO™ CORONARY DILATATION CATHETER 3.50 MM X 12 MM / RAPID-EXCHANGE: Brand: NC TREK NEO™

## (undated) DEVICE — LOU PACE DEFIB: Brand: MEDLINE INDUSTRIES, INC.